# Patient Record
Sex: FEMALE | Race: WHITE | NOT HISPANIC OR LATINO | Employment: OTHER | URBAN - METROPOLITAN AREA
[De-identification: names, ages, dates, MRNs, and addresses within clinical notes are randomized per-mention and may not be internally consistent; named-entity substitution may affect disease eponyms.]

---

## 2017-01-12 ENCOUNTER — CONVERSION ENCOUNTER (OUTPATIENT)
Dept: MAMMOGRAPHY | Facility: CLINIC | Age: 62
End: 2017-01-12

## 2018-04-20 LAB
ABSOL LYMPHOCYTES (HISTORICAL): 1.7 K/UL (ref 0.5–4)
ALBUMIN SERPL BCP-MCNC: 4.5 G/DL (ref 3–5.2)
ALP SERPL-CCNC: 61 U/L (ref 43–122)
ALT SERPL W P-5'-P-CCNC: 71 U/L (ref 9–52)
ANION GAP SERPL CALCULATED.3IONS-SCNC: 14 MMOL/L (ref 5–14)
APTT PPP: 26 SEC (ref 23–31)
AST SERPL W P-5'-P-CCNC: 56 U/L (ref 14–36)
B-NP NT PRO (HISTORICAL): 35 PG/ML
BASOPHILS # BLD AUTO: 0 % (ref 0–1)
BASOPHILS # BLD AUTO: 0 K/UL (ref 0–0.1)
BILIRUB SERPL-MCNC: 0.6 MG/DL
BUN SERPL-MCNC: 17 MG/DL (ref 5–25)
CALCIUM SERPL-MCNC: 10.3 MG/DL (ref 8.4–10.2)
CHLORIDE SERPL-SCNC: 103 MEQ/L (ref 97–108)
CO2 SERPL-SCNC: 26 MMOL/L (ref 22–30)
CREATINE, SERUM (HISTORICAL): 0.86 MG/DL (ref 0.6–1.2)
D-DIMER QUANTITATIVE (HISTORICAL): 1.82 MG/L FEU'S
DEPRECATED RDW RBC AUTO: 13.7 %
EGFR (HISTORICAL): >60 ML/MIN/1.73 M2
EOSINOPHIL # BLD AUTO: 0.2 K/UL (ref 0–0.4)
EOSINOPHIL NFR BLD AUTO: 3 % (ref 0–6)
ERYTHROCYTE SEDIMENTATION RATE (HISTORICAL): 58 MM (ref 1–20)
GLUCOSE SERPL-MCNC: 132 MG/DL (ref 70–99)
HCT VFR BLD AUTO: 43.9 % (ref 36–46)
HGB BLD-MCNC: 14.7 G/DL (ref 12–16)
LYMPHOCYTES NFR BLD AUTO: 29 % (ref 25–45)
MCH RBC QN AUTO: 30.4 PG (ref 26–34)
MCHC RBC AUTO-ENTMCNC: 33.4 % (ref 31–36)
MCV RBC AUTO: 91 FL (ref 80–100)
MONOCYTES # BLD AUTO: 0.4 K/UL (ref 0.2–0.9)
MONOCYTES NFR BLD AUTO: 6 % (ref 1–10)
NEUTROPHILS ABS COUNT (HISTORICAL): 3.6 K/UL (ref 1.8–7.8)
NEUTS SEG NFR BLD AUTO: 62 % (ref 45–65)
PLATELET # BLD AUTO: 171 K/MCL (ref 150–450)
POTASSIUM SERPL-SCNC: 4 MEQ/L (ref 3.6–5)
PT - I.N. RATIO (HISTORICAL): 1 RATIO(INR)
PT, PATIENT (HISTORICAL): 10.3 SEC (ref 9.2–11.1)
RBC # BLD AUTO: 4.83 M/MCL (ref 4–5.2)
SODIUM SERPL-SCNC: 144 MEQ/L (ref 137–147)
T4 FREE SERPL-MCNC: 1.22 NG/DL (ref 0.78–2.19)
TOTAL PROTEIN (HISTORICAL): 8 G/DL (ref 5.9–8.4)
TROPONIN I SERPL-MCNC: <0.01 NG/ML (ref 0–0.03)
TSH SERPL DL<=0.05 MIU/L-ACNC: 6.78 UIU/ML (ref 0.47–4.68)
WBC # BLD AUTO: 5.9 K/MCL (ref 4.5–11)

## 2018-04-21 LAB
ALBUMIN SERPL BCP-MCNC: 4.2 G/DL (ref 3–5.2)
ALP SERPL-CCNC: 59 U/L (ref 43–122)
ALT SERPL W P-5'-P-CCNC: 69 U/L (ref 9–52)
ANION GAP SERPL CALCULATED.3IONS-SCNC: 11 MMOL/L (ref 5–14)
AST SERPL W P-5'-P-CCNC: 53 U/L (ref 14–36)
BILIRUB SERPL-MCNC: 0.6 MG/DL
BUN SERPL-MCNC: 17 MG/DL (ref 5–25)
CALCIUM SERPL-MCNC: 10.1 MG/DL (ref 8.4–10.2)
CHLORIDE SERPL-SCNC: 103 MEQ/L (ref 97–108)
CHOLEST SERPL-MCNC: 215 MG/DL
CHOLEST/HDLC SERPL: 4.1 {RATIO}
CK SERPL-CCNC: 87 U/L (ref 30–135)
CK SERPL-CCNC: 91 U/L (ref 30–135)
CO2 SERPL-SCNC: 27 MMOL/L (ref 22–30)
CREATINE KINASE-MB FRACTION (HISTORICAL): 0.47 NG/ML (ref 0–2.37)
CREATINE KINASE-MB FRACTION (HISTORICAL): 0.57 NG/ML (ref 0–2.37)
CREATINE, SERUM (HISTORICAL): 0.86 MG/DL (ref 0.6–1.2)
EGFR (HISTORICAL): >60 ML/MIN/1.73 M2
GLUCOSE SERPL-MCNC: 102 MG/DL (ref 70–99)
HDLC SERPL-MCNC: 52 MG/DL
HEPATITIS A IGM ANTIBODY (HISTORICAL): NORMAL
HEPATITIS B CORE IGM ANTIBODY (HISTORICAL): NORMAL
HEPATITIS B SURFACE AG CONFIRMATION (HISTORICAL): NORMAL
HEPATITIS C ANTIBODY (HISTORICAL): NORMAL
LDL/HDL RATIO (HISTORICAL): 2.4
LDLC SERPL CALC-MCNC: 123 MG/DL
MAGNESIUM SERPL-MCNC: 1.9 MG/DL (ref 1.6–2.3)
POTASSIUM SERPL-SCNC: 4.2 MEQ/L (ref 3.6–5)
SODIUM SERPL-SCNC: 141 MEQ/L (ref 137–147)
TOTAL PROTEIN (HISTORICAL): 7.7 G/DL (ref 5.9–8.4)
TRIGL SERPL-MCNC: 199 MG/DL
TROPONIN I SERPL-MCNC: <0.01 NG/ML (ref 0–0.03)
TROPONIN I SERPL-MCNC: <0.01 NG/ML (ref 0–0.03)
VLDLC SERPL CALC-MCNC: 40 MG/DL (ref 0–40)

## 2018-04-22 LAB
EST. AVERAGE GLUCOSE BLD GHB EST-MCNC: 126 MG/DL
HBA1C MFR BLD HPLC: 6 %

## 2018-10-11 RX ORDER — ASPIRIN 325 MG
TABLET, DELAYED RELEASE (ENTERIC COATED) ORAL
Refills: 2 | COMMUNITY
Start: 2018-07-09 | End: 2019-07-17 | Stop reason: SDUPTHER

## 2018-10-11 RX ORDER — OXYBUTYNIN CHLORIDE 5 MG/1
TABLET ORAL EVERY 12 HOURS
COMMUNITY
Start: 2018-05-07 | End: 2019-07-17 | Stop reason: SDUPTHER

## 2018-10-11 RX ORDER — PANTOPRAZOLE SODIUM 40 MG/1
TABLET, DELAYED RELEASE ORAL EVERY 24 HOURS
COMMUNITY
Start: 2018-06-05 | End: 2019-01-07 | Stop reason: SDUPTHER

## 2018-10-11 RX ORDER — ALBUTEROL SULFATE 90 UG/1
AEROSOL, METERED RESPIRATORY (INHALATION) EVERY 4 HOURS
COMMUNITY
Start: 2018-03-26 | End: 2018-10-12 | Stop reason: SDUPTHER

## 2018-10-12 ENCOUNTER — OFFICE VISIT (OUTPATIENT)
Dept: FAMILY MEDICINE CLINIC | Facility: CLINIC | Age: 63
End: 2018-10-12
Payer: COMMERCIAL

## 2018-10-12 VITALS
SYSTOLIC BLOOD PRESSURE: 120 MMHG | HEART RATE: 72 BPM | DIASTOLIC BLOOD PRESSURE: 80 MMHG | BODY MASS INDEX: 47.54 KG/M2 | HEIGHT: 55 IN | RESPIRATION RATE: 16 BRPM | TEMPERATURE: 96.3 F | WEIGHT: 205.4 LBS

## 2018-10-12 DIAGNOSIS — Z12.39 ENCOUNTER FOR BREAST CANCER SCREENING OTHER THAN MAMMOGRAM: Primary | ICD-10-CM

## 2018-10-12 DIAGNOSIS — R10.9 ABDOMINAL DISCOMFORT: ICD-10-CM

## 2018-10-12 DIAGNOSIS — Z23 NEED FOR INFLUENZA VACCINATION: ICD-10-CM

## 2018-10-12 PROCEDURE — 99213 OFFICE O/P EST LOW 20 MIN: CPT

## 2018-10-12 PROCEDURE — 90682 RIV4 VACC RECOMBINANT DNA IM: CPT

## 2018-10-12 PROCEDURE — 90471 IMMUNIZATION ADMIN: CPT

## 2018-10-12 RX ORDER — ALBUTEROL SULFATE 90 UG/1
1 AEROSOL, METERED RESPIRATORY (INHALATION) EVERY 6 HOURS
COMMUNITY
End: 2019-07-17 | Stop reason: SDUPTHER

## 2018-10-12 RX ORDER — OXYBUTYNIN CHLORIDE 5 MG/1
TABLET ORAL EVERY 12 HOURS
COMMUNITY
Start: 2018-05-07 | End: 2018-10-12 | Stop reason: SDUPTHER

## 2018-10-12 RX ORDER — PANTOPRAZOLE SODIUM 40 MG/1
TABLET, DELAYED RELEASE ORAL EVERY 24 HOURS
COMMUNITY
Start: 2018-06-05 | End: 2018-10-12 | Stop reason: SDUPTHER

## 2018-10-12 NOTE — PATIENT INSTRUCTIONS
Influenza A Virus Vaccine, H1N1, Inactivated (Injection)   Influenza A Virus Vaccine, H1N1, Inactivated (in-floo-EN-za AY VYE-junior VAX-een, H1N1, in-AK-ti-vay-ronald)  Keeps you from getting sick with an influenza A (H1N1) 2009 virus ("catching the flu")  Brand Name(s):   There may be other brand names for this medicine  When This Medicine Should Not Be Used: You should not receive this vaccine if you have had an allergic reaction to influenza vaccine (a "flu shot"), eggs, egg products, chicken products, neomycin (Mycifradin®), or polymyxin  How to Use This Medicine:   Injectable  · Your doctor will prescribe your exact dose and tell you how often it should be given  This medicine is given as a shot into one of your muscles  · A nurse or other health provider will give you this medicine  If a dose is missed:   · Most people need only one dose of this medicine  · If your child needs a second dose of this medicine, it is very important for your child to receive the second dose on schedule  If you must cancel the appointment for the second dose, make another appointment as close to that date as possible  Drugs and Foods to Avoid:   Ask your doctor or pharmacist before using any other medicine, including over-the-counter medicines, vitamins, and herbal products  · Make sure your doctor knows if you are using any medicine that weakens the immune system (such as steroids, cancer treatments, or radiation)  Warnings While Using This Medicine:   · Make sure your doctor knows if you are pregnant or breastfeeding, or if you have HIV or AIDS, cancer, problems with your immune system, or a history of Guillain-Barré syndrome (a severe nerve and muscle problem)  · This vaccine may cause a serious type of allergic reaction called anaphylaxis  Anaphylaxis can be life-threatening and requires immediate medical attention   Tell your doctor right away if you have a rash, itching, swelling of the tongue and throat, or trouble with breathing after you get the injection  · Avoid people who are sick or have infections  · This medicine will not treat flu symptoms if you already have the virus  Possible Side Effects While Using This Medicine:   Call your doctor right away if you notice any of these side effects:  · Allergic reaction: Itching or hives, swelling in your face or hands, swelling or tingling in your mouth or throat, chest tightness, trouble breathing  · Fever, chills, cough, sore throat, and body aches  · Severe headache  · Unusual tiredness or weakness  If you notice these less serious side effects, talk with your doctor:   · Back, muscle, or joint pain  · Diarrhea, nausea, or vomiting  · Mild headache  · Pain, redness, swelling, or tenderness where the shot was given  · Stuffy or runny nose  · Sweating  If you notice other side effects that you think are caused by this medicine, tell your doctor  Call your doctor for medical advice about side effects  You may report side effects to FDA at 0-977-FDA-3773  © 2014 [address] is for End User's use only and may not be sold, redistributed or otherwise used for commercial purposes  The above information is an  only  It is not intended as medical advice for individual conditions or treatments  Talk to your doctor, nurse or pharmacist before following any medical regimen to see if it is safe and effective for you

## 2018-10-12 NOTE — PROGRESS NOTES
Assessment/Plan:    No problem-specific Assessment & Plan notes found for this encounter  Diagnoses and all orders for this visit:    Encounter for breast cancer screening other than mammogram  -     Mammo diagnostic bilateral w cad; Future    Abdominal discomfort  -     influenza vaccine, [id number], quadrivalent, recombinant, PF, 0 5 mL, for patients 18 yr+ (FLUBLOK)  Will see if improving and if not will need to re-eval and possible scan  Difficult to feel due to habitus  Due for GYN and refer to [facility] to make sure not ovary related  Need for influenza vaccination  -     influenza vaccine, [id number], quadrivalent, recombinant, PF, 0 5 mL, for patients 18 yr+ (FLUBLOK)    Other orders  -     aspirin (ECOTRIN) 325 mg EC tablet; TK 1 T PO  D  -     Ergocalciferol 2000 units CAPS; take as directed  -     oxybutynin (DITROPAN) 5 mg tablet; Every 12 hours  -     pantoprazole (PROTONIX) 40 mg tablet; every 24 hours  -     Discontinue: albuterol (VENTOLIN HFA) 90 mcg/act inhaler; every 4 (four) hours  -     Discontinue: aspirin (ECOTRIN) 325 mg EC tablet; every 24 hours  -     Discontinue: Ergocalciferol 2000 units CAPS; take as directed  -     albuterol (PROVENTIL HFA,VENTOLIN HFA) 90 mcg/act inhaler; Inhale 1 puff every 6 (six) hours  -     Discontinue: oxybutynin (DITROPAN) 5 mg tablet; Every 12 hours  -     Discontinue: pantoprazole (PROTONIX) 40 mg tablet; every 24 hours          Subjective:      Patient ID: Brendan Medina is a 59 y o  female  HPI    Azul Bullock presents today for groin pain  It has been going on for about 3 weeks  States that it is there most of the time but gets more intense from time to time  Certain movements will make the pain worse  No injury that she knows of at this point  Has had no treatment to date        She is also due for her flu injection today which we will give her as well as a script for her mammogram     The following portions of the patient's history were reviewed and updated as appropriate: allergies, current medications, past family history, past medical history, past social history, past surgical history and problem list     Review of Systems   Constitutional: Positive for activity change  Negative for appetite change, fatigue and fever  Slight change in activity due to discomfort    Discomfort with steps and certain postitions    HENT: Negative for congestion, sinus pain, sinus pressure, sneezing and tinnitus  Respiratory: Negative for cough  Cardiovascular: Negative for chest pain  Gastrointestinal: Positive for diarrhea  Negative for abdominal pain, constipation and nausea  Saw Dr Rochelle De Luna and has a stimulator in place  Is helping    Genitourinary: Positive for frequency  Negative for dysuria  Musculoskeletal:        Left groin pain    Neurological: Negative for dizziness, light-headedness and numbness  Objective:  Vitals:    10/12/18 0806   BP: 120/80   BP Location: Left arm   Patient Position: Sitting   Cuff Size: Large   Pulse: 72   Resp: 16   Temp: (!) 96 3 °F (35 7 °C)   TempSrc: Temporal   Weight: 93 2 kg (205 lb 6 4 oz)   Height: 4' 7" (1 397 m)      Physical Exam   Constitutional: She appears well-developed and well-nourished  Abdominal: Soft  Normal appearance and bowel sounds are normal  There is tenderness in the left lower quadrant  Genitourinary:   Genitourinary Comments: Has a stimulator in right buttock and wire implant for rectal wall weakness   Vitals reviewed

## 2018-10-17 ENCOUNTER — HOSPITAL ENCOUNTER (OUTPATIENT)
Dept: MAMMOGRAPHY | Facility: CLINIC | Age: 63
Discharge: HOME | End: 2018-10-17
Payer: COMMERCIAL

## 2018-10-17 DIAGNOSIS — Z12.39 ENCOUNTER FOR BREAST CANCER SCREENING OTHER THAN MAMMOGRAM: ICD-10-CM

## 2018-10-17 PROCEDURE — 77067 SCR MAMMO BI INCL CAD: CPT

## 2018-10-23 ENCOUNTER — OFFICE VISIT (OUTPATIENT)
Dept: FAMILY MEDICINE CLINIC | Facility: CLINIC | Age: 63
End: 2018-10-23
Payer: COMMERCIAL

## 2018-10-23 VITALS
HEART RATE: 74 BPM | DIASTOLIC BLOOD PRESSURE: 80 MMHG | SYSTOLIC BLOOD PRESSURE: 102 MMHG | WEIGHT: 205 LBS | TEMPERATURE: 99.3 F | HEIGHT: 55 IN | BODY MASS INDEX: 47.44 KG/M2

## 2018-10-23 DIAGNOSIS — H60.392 OTHER INFECTIVE ACUTE OTITIS EXTERNA OF LEFT EAR: ICD-10-CM

## 2018-10-23 DIAGNOSIS — R50.9 FEVER, UNSPECIFIED FEVER CAUSE: Primary | ICD-10-CM

## 2018-10-23 LAB — S PYO AG THROAT QL: NEGATIVE

## 2018-10-23 PROCEDURE — 3008F BODY MASS INDEX DOCD: CPT | Performed by: NURSE PRACTITIONER

## 2018-10-23 PROCEDURE — 87880 STREP A ASSAY W/OPTIC: CPT | Performed by: NURSE PRACTITIONER

## 2018-10-23 PROCEDURE — 87430 STREP A AG IA: CPT | Performed by: NURSE PRACTITIONER

## 2018-10-23 PROCEDURE — 99213 OFFICE O/P EST LOW 20 MIN: CPT | Performed by: NURSE PRACTITIONER

## 2018-10-23 PROCEDURE — 3725F SCREEN DEPRESSION PERFORMED: CPT | Performed by: NURSE PRACTITIONER

## 2018-10-23 PROCEDURE — 87070 CULTURE OTHR SPECIMN AEROBIC: CPT | Performed by: NURSE PRACTITIONER

## 2018-10-23 RX ORDER — OFLOXACIN 3 MG/ML
10 SOLUTION AURICULAR (OTIC) 2 TIMES DAILY
Qty: 5 ML | Refills: 0 | Status: SHIPPED | OUTPATIENT
Start: 2018-10-23 | End: 2019-10-03 | Stop reason: ALTCHOICE

## 2018-10-23 NOTE — PATIENT INSTRUCTIONS
Otitis Externa   WHAT YOU NEED TO KNOW:   What is otitis externa? Otitis externa, or swimmer's ear, is an infection in the outer ear canal  This canal goes from the outside of the ear to the eardrum  What causes otitis externa? Otitis externa is most commonly caused by bacteria  It can also be caused by damage to the skin lining your outer ear canal  You can scratch or damage the skin lining when you put cotton swabs or other objects in your ears  What increases my risk for otitis externa? · Swimming    · Hot, humid weather    · Hearing aid use    · A lot of ear wax    · Allergic skin disorders, such as eczema    · Medical conditions that make it easier to get infections, such as diabetes  What are the signs and symptoms of otitis externa? · You have ear pain  · Your outer ear canal is red and swollen  · You have clear fluid or pus leaking out of your ear  · Your outer ear canal is itchy and you see a rash  · You have trouble hearing because your ear is plugged  · You feel a bump in your ear canal, called a polyp  · Flakes of skin fall from your ear  How is otitis externa diagnosed? Your healthcare provider will ask about your signs and symptoms  He will look inside your ears  He may blow a puff of air inside your ears  These tests tell healthcare providers if your eardrums look healthy  You may also need a hearing test    How is otitis externa treated? · NSAIDs , such as ibuprofen, help decrease swelling, pain, and fever  This medicine is available with or without a doctor's order  NSAIDs can cause stomach bleeding or kidney problems in certain people  If you take blood thinner medicine, always ask if NSAIDs are safe for you  Always read the medicine label and follow directions  Do not give these medicines to children under 10months of age without direction from your child's healthcare provider  · Acetaminophen  decreases pain and fever   It is available without a doctor's order  Ask how much to take and how often to take it  Follow directions  Acetaminophen can cause liver damage if not taken correctly  · Ear drops  that contain an antibiotic may be given  The antibiotic helps treat a bacterial infection  You may also be given steroid medicine  The steroid helps decrease redness, swelling, and pain  · Ear wicking  removes fluid or wax from your outer ear canal  Healthcare providers may insert a small tube, called a wick, into your ear to help drain fluid  A wick also may be used to put medicine into your ear canal if the canal is blocked  How do I use eardrops? · Lie down on your side with your infected ear facing up  · Carefully drip the correct number of eardrops into your ear  Have another person help you if possible  · Gently move the outside part of your ear back and forth to help the medicine reach your ear canal      · Stay lying down in the same position (with your ear facing up) for 3 to 5 minutes  How can I prevent otitis externa? · Do not put cotton swabs or foreign objects in your ears  · Wrap a clean moist washcloth around your finger, and use it to clean your outer ear and remove extra ear wax  · Use ear plugs when you swim  Dry your outer ears completely after you swim or bathe  When should I seek immediate care? · You have severe ear pain  · You are suddenly unable to hear at all  · You have new swelling in your face, behind your ears, or in your neck  · You suddenly cannot move part of your face  · Your face suddenly feels numb  When should I contact my healthcare provider? · You have a fever  · Your signs and symptoms do not get better after 2 days of treatment  · Your signs and symptoms go away for a time, but then come back  · You have questions or concerns about your condition or care  CARE AGREEMENT:   You have the right to help plan your care  Learn about your health condition and how it may be treated  Discuss treatment options with your caregivers to decide what care you want to receive  You always have the right to refuse treatment  The above information is an  only  It is not intended as medical advice for individual conditions or treatments  Talk to your doctor, nurse or pharmacist before following any medical regimen to see if it is safe and effective for you  © 2017 [address] Information is for End User's use only and may not be sold, redistributed or otherwise used for commercial purposes  All illustrations and images included in CareNotes® are the copyrighted property of A D [employer] M [employer]  or [employer]ilberto Worthington

## 2018-10-23 NOTE — PROGRESS NOTES
Assessment/Plan:    No problem-specific Assessment & Plan notes found for this encounter  Diagnoses and all orders for this visit:    Other infective acute otitis externa of left ear  -     ofloxacin (FLOXIN) 0 3 % otic solution; Administer 10 drops into the left ear 2 (two) times a day  No further use of qtips     Return if not better or drops dont get in         Subjective:      Patient ID: Brendan Medina is a 59 y o  female  HPI      Azul Bullock presents today for upper respiratory type symptoms  It started on Saturday  It started with a sore throat and is progressing to a left ear ache a lot of mucus and nasal congestion  Her granddaughter is sick with an upper respiratory type of disease at this time  Uses q tips   Advised to stop      The following portions of the patient's history were reviewed and updated as appropriate: allergies, current medications, past family history, past medical history, past social history, past surgical history and problem list     Review of Systems   Constitutional: Positive for appetite change, fatigue and fever  Negative for activity change  HENT: Positive for congestion, ear pain, rhinorrhea and sore throat  Negative for sneezing  Respiratory: Positive for cough  Negative for shortness of breath  Cardiovascular: Negative for chest pain  Gastrointestinal: Negative for abdominal pain, constipation, diarrhea and nausea  Genitourinary: Negative for dysuria, frequency and urgency  Musculoskeletal: Negative for back pain  Skin: Negative for rash  Neurological: Negative for dizziness, light-headedness and headaches  Objective:  Vitals:    10/23/18 1306   BP: 102/80   BP Location: Left arm   Patient Position: Sitting   Cuff Size: Large   Pulse: 74   Temp: 99 3 °F (37 4 °C)   TempSrc: Oral   Weight: 93 kg (205 lb)   Height: 4' 7" (1 397 m)      Physical Exam   Constitutional: She appears well-developed and well-nourished     HENT:   Right Ear: Tympanic membrane, external ear and ear canal normal    Left Ear: There is swelling and tenderness  No drainage  Mouth/Throat: Mucous membranes are normal  Posterior oropharyngeal erythema present  No oropharyngeal exudate  Canal red and inflammed   Do see a slight opening  Hurts to pull     Eyes: Conjunctivae are normal    Neck: Normal range of motion  Neck supple  Cardiovascular: Normal rate, regular rhythm and normal heart sounds  Pulmonary/Chest: Effort normal and breath sounds normal    Abdominal: Soft  Bowel sounds are normal    Lymphadenopathy:     She has no cervical adenopathy  Vitals reviewed

## 2018-10-26 LAB — BACTERIA THROAT CULT: NORMAL

## 2018-11-05 ENCOUNTER — ANNUAL EXAM (OUTPATIENT)
Dept: FAMILY MEDICINE CLINIC | Facility: CLINIC | Age: 63
End: 2018-11-05
Payer: COMMERCIAL

## 2018-11-05 VITALS
SYSTOLIC BLOOD PRESSURE: 100 MMHG | DIASTOLIC BLOOD PRESSURE: 70 MMHG | BODY MASS INDEX: 47.54 KG/M2 | RESPIRATION RATE: 18 BRPM | HEIGHT: 55 IN | HEART RATE: 66 BPM | WEIGHT: 205.4 LBS

## 2018-11-05 DIAGNOSIS — Z12.39 SCREENING FOR MALIGNANT NEOPLASM OF BREAST: ICD-10-CM

## 2018-11-05 DIAGNOSIS — E55.9 VITAMIN D DEFICIENCY: ICD-10-CM

## 2018-11-05 DIAGNOSIS — Z01.419 WELL FEMALE EXAM WITH ROUTINE GYNECOLOGICAL EXAM: Primary | ICD-10-CM

## 2018-11-05 LAB — 25(OH)D3 SERPL-MCNC: 33.9 NG/ML (ref 30–100)

## 2018-11-05 PROCEDURE — 87624 HPV HI-RISK TYP POOLED RSLT: CPT | Performed by: NURSE PRACTITIONER

## 2018-11-05 PROCEDURE — 36415 COLL VENOUS BLD VENIPUNCTURE: CPT | Performed by: NURSE PRACTITIONER

## 2018-11-05 PROCEDURE — G0145 SCR C/V CYTO,THINLAYER,RESCR: HCPCS | Performed by: NURSE PRACTITIONER

## 2018-11-05 PROCEDURE — 99214 OFFICE O/P EST MOD 30 MIN: CPT | Performed by: NURSE PRACTITIONER

## 2018-11-05 PROCEDURE — 82306 VITAMIN D 25 HYDROXY: CPT | Performed by: NURSE PRACTITIONER

## 2018-11-05 NOTE — PATIENT INSTRUCTIONS
Heart Healthy Diet   WHAT YOU NEED TO KNOW:   A heart healthy diet is an eating plan low in total fat, unhealthy fats, and sodium (salt)  A heart healthy diet helps decrease your risk for heart disease and stroke  Limit the amount of fat you eat to 25% to 35% of your total daily calories  Limit sodium to less than 2,300 mg each day  DISCHARGE INSTRUCTIONS:   Healthy fats:  Healthy fats can help improve cholesterol levels  The risk for heart disease is decreased when cholesterol levels are normal  Choose healthy fats, such as the following:  · Unsaturated fat  is found in foods such as soybean, canola, olive, corn, and safflower oils  It is also found in soft tub margarine that is made with liquid vegetable oil  · Omega-3 fat  is found in certain fish, such as salmon, tuna, and trout, and in walnuts and flaxseed  Unhealthy fats:  Unhealthy fats can cause unhealthy cholesterol levels in your blood and increase your risk of heart disease  Limit unhealthy fats, such as the following:  · Cholesterol  is found in animal foods, such as eggs and lobster, and in dairy products made from whole milk  Limit cholesterol to less than 300 milligrams (mg) each day  You may need to limit cholesterol to 200 mg each day if you have heart disease  · Saturated fat  is found in meats, such as stern and hamburger  It is also found in chicken or turkey skin, whole milk, and butter  Limit saturated fat to less than 7% of your total daily calories  Limit saturated fat to less than 6% if you have heart disease or are at increased risk for it  · Trans fat  is found in packaged foods, such as potato chips and cookies  It is also in hard margarine, some fried foods, and shortening  Avoid trans fats as much as possible    Heart healthy foods and drinks to include:  Ask your dietitian or healthcare provider how many servings to have from each of the following food groups:  · Grains:      ¨ Whole-wheat breads, cereals, and pastas, and brown rice    ¨ Low-fat, low-sodium crackers and chips    · Vegetables:      ¨ Broccoli, green beans, green peas, and spinach    ¨ Collards, kale, and lima beans    ¨ Carrots, sweet potatoes, tomatoes, and peppers    ¨ Canned vegetables with no salt added    · Fruits:      ¨ Bananas, peaches, pears, and pineapple    ¨ Grapes, raisins, and dates    ¨ Oranges, tangerines, grapefruit, orange juice, and grapefruit juice    ¨ Apricots, mangoes, melons, and papaya    ¨ Raspberries and strawberries    ¨ Canned fruit with no added sugar    · Low-fat dairy products:      ¨ Nonfat (skim) milk, 1% milk, and low-fat almond, cashew, or soy milks fortified with calcium    ¨ Low-fat cheese, regular or frozen yogurt, and cottage cheese    · Meats and proteins , such as lean cuts of beef and pork (loin, leg, round), skinless chicken and turkey, legumes, soy products, egg whites, and nuts  Foods and drinks to limit or avoid:  Ask your dietitian or healthcare provider about these and other foods that are high in unhealthy fat, sodium, and sugar:  · Snack or packaged foods , such as frozen dinners, cookies, macaroni and cheese, and cereals with more than 300 mg of sodium per serving    · Canned or dry mixes  for cakes, soups, sauces, or gravies    · Vegetables with added sodium , such as instant potatoes, vegetables with added sauces, or regular canned vegetables    · Other foods high in sodium , such as ketchup, barbecue sauce, salad dressing, pickles, olives, soy sauce, and miso    · High-fat dairy foods  such as whole or 2% milk, cream cheese, or sour cream, and cheeses     · High-fat protein foods  such as high-fat cuts of beef (T-bone steaks, ribs), chicken or turkey with skin, and organ meats, such as liver    · Cured or smoked meats , such as hot dogs, stern, and sausage    · Unhealthy fats and oils , such as butter, stick margarine, shortening, and cooking oils such as coconut or palm oil    · Food and drinks high in sugar , such as soft drinks (soda), sports drinks, sweetened tea, candy, cake, cookies, pies, and doughnuts  Other diet guidelines to follow:   · Eat more foods containing omega-3 fats  Eat fish high in omega-3 fats at least 2 times a week  · Limit alcohol  Too much alcohol can damage your heart and raise your blood pressure  Women should limit alcohol to 1 drink a day  Men should limit alcohol to 2 drinks a day  A drink of alcohol is 12 ounces of beer, 5 ounces of wine, or 1½ ounces of liquor  · Choose low-sodium foods  High-sodium foods can lead to high blood pressure  Add little or no salt to food you prepare  Use herbs and spices in place of salt  · Eat more fiber  to help lower cholesterol levels  Eat at least 5 servings of fruits and vegetables each day  Eat 3 ounces of whole-grain foods each day  Legumes (beans) are also a good source of fiber  Lifestyle guidelines:   · Do not smoke  Nicotine and other chemicals in cigarettes and cigars can cause lung and heart damage  Ask your healthcare provider for information if you currently smoke and need help to quit  E-cigarettes or smokeless tobacco still contain nicotine  Talk to your healthcare provider before you use these products  · Exercise regularly  to help you maintain a healthy weight and improve your blood pressure and cholesterol levels  Ask your healthcare provider about the best exercise plan for you  Do not start an exercise program without asking your healthcare provider  Follow up with your healthcare provider as directed:  Write down your questions so you remember to ask them during your visits  © 2017 [address] Information is for End User's use only and may not be sold, redistributed or otherwise used for commercial purposes  All illustrations and images included in CareNotes® are the copyrighted property of [employer]  or Edilberto Worthington  The above information is an  only   It is not intended as medical advice for individual conditions or treatments  Talk to your doctor, nurse or pharmacist before following any medical regimen to see if it is safe and effective for you

## 2018-11-05 NOTE — PROGRESS NOTES
Gary Gutierrez is a 59 y o   female and is here for routine health maintenance  The patient reports no problems  History of Present Illness     HPI    Well Adult Physical   Patient here for a Gynecological exam       Diet and Physical Activity  Diet: poor diet  Weight concerns: Patient has class 3 obesity (BMI >40)  Exercise: infrequently      Depression Screen  PHQ-9 Depression Screening    PHQ-9:    Frequency of the following problems over the past two weeks:                History:  Menopause at 49 years  : 1  Para: 1  Breast Fed no  Bottle Fed yes  Both no   Screening  Colononoscopy 2016  Mammogram last month   Pap 2016  DEXA will order  Abnormal pap? yes      The following portions of the patient's history were reviewed and updated as appropriate: allergies, current medications, past family history, past medical history, past social history, past surgical history and problem list     Review of Systems     Review of Systems   Constitutional: Negative for chills, fatigue and fever  Cardiovascular: Negative for leg swelling  Gastrointestinal: Negative for abdominal pain  Genitourinary: Negative for dyspareunia, dysuria, frequency, genital sores, menstrual problem, pelvic pain, urgency, vaginal bleeding, vaginal discharge and vaginal pain  Skin: Negative for rash  Hematological: Negative for adenopathy         Breast ROS: negative for breast lumps  Self Breast Exam No  Genito-Urinary ROS: no dysuria, trouble voiding, or hematuria  Hot Flashes no  Night Sweats yes  Vaginal dryness no  Insomnia no  Sexually Active no  Calcium no  Vitamin D yes  Weight bearing Exercise yes  Past Medical History     Past Medical History:   Diagnosis Date    Asthma     Lateral epicondylitis 10/9/2015       Past Surgical History     Past Surgical History:   Procedure Laterality Date    IMPLANTATION VAGAL NERVE STIMULATOR         Social History     Social History     Social History    Marital status: Single     Spouse name: N/A    Number of children: N/A    Years of education: N/A     Social History Main Topics    Smoking status: Former Smoker     Packs/day: 1 00     Types: Cigarettes     Quit date: 2015    Smokeless tobacco: Former User    Alcohol use No    Drug use: No    Sexual activity: Not Asked     Other Topics Concern    None     Social History Narrative    None       Family History     Family History   Problem Relation Age of Onset    Lung cancer Mother        Current Medications       Current Outpatient Prescriptions:     albuterol (PROVENTIL HFA,VENTOLIN HFA) 90 mcg/act inhaler, Inhale 1 puff every 6 (six) hours, Disp: , Rfl:     aspirin (ECOTRIN) 325 mg EC tablet, TK 1 T PO  D, Disp: , Rfl: 2    Ergocalciferol 2000 units CAPS, take as directed, Disp: , Rfl:     ofloxacin (FLOXIN) 0 3 % otic solution, Administer 10 drops into the left ear 2 (two) times a day, Disp: 5 mL, Rfl: 0    oxybutynin (DITROPAN) 5 mg tablet, Every 12 hours, Disp: , Rfl:     pantoprazole (PROTONIX) 40 mg tablet, every 24 hours, Disp: , Rfl:      Allergies     Allergies   Allergen Reactions    Lansoprazole Palpitations     Erythema/ tachycardia  Other reaction(s): tachycardia    Penicillins Itching and Rash     Other reaction(s): Rash  Other reaction(s): Rash       Objective     /70 (BP Location: Left arm, Patient Position: Sitting, Cuff Size: Large)   Pulse 66   Resp 18   Ht 4' 7" (1 397 m)   Wt 93 2 kg (205 lb 6 4 oz)   BMI 47 74 kg/m²      Physical Exam   Constitutional: She is oriented to person, place, and time  She appears well-developed and well-nourished  Neck: No thyromegaly present  Cardiovascular: Normal rate, regular rhythm and normal heart sounds  Pulmonary/Chest: Effort normal and breath sounds normal  Right breast exhibits no inverted nipple, no mass, no nipple discharge, no skin change and no tenderness   Left breast exhibits no inverted nipple, no mass, no nipple discharge, no skin change and no tenderness  Breasts are symmetrical    Abdominal: There is no tenderness  Hernia confirmed negative in the right inguinal area and confirmed negative in the left inguinal area  Genitourinary: Rectum normal and uterus normal        No breast swelling, tenderness, discharge or bleeding  No labial fusion  There is no rash, tenderness, lesion or injury on the right labia  There is no rash, tenderness, lesion or injury on the left labia  Cervix exhibits friability  Cervix exhibits no motion tenderness and no discharge  Right adnexum displays no mass, no tenderness and no fullness  Left adnexum displays no mass, no tenderness and no fullness  Genitourinary Comments: Vagina Atrophic  Multiple vulvar nodules  Need biopsy  Obesity limits exam accuracy   Lymphadenopathy:     She has no cervical adenopathy  Right: No inguinal adenopathy present  Left: No inguinal adenopathy present  Neurological: She is alert and oriented to person, place, and time  Skin: Skin is warm and dry  Psychiatric: She has a normal mood and affect   Her behavior is normal          No exam data present    Health Maintenance     Health Maintenance   Topic Date Due    Depression Screening PHQ  10/23/2019    DTaP,Tdap,and Td Vaccines (2 - Td) 09/04/2025    INFLUENZA VACCINE  Completed     Immunization History   Administered Date(s) Administered    Influenza 11/07/2015, 11/14/2016, 11/01/2017    Influenza, Quadrivalent (nasal) 11/01/2017    Influenza, recombinant, quadrivalent,injectable, preservative free 10/12/2018    Tdap 09/04/2015       Assessment/Plan     Healthy well female  Waiting on PAP/HPV and DEXA results  Will return for vulvar biopsy  Obesity: Discussed importance of weight loss and exercise  BRIE Alvarez

## 2018-11-06 LAB
HPV HR 12 DNA CVX QL NAA+PROBE: POSITIVE
HPV16 DNA CVX QL NAA+PROBE: NEGATIVE
HPV18 DNA CVX QL NAA+PROBE: NEGATIVE

## 2018-11-08 LAB
LAB AP GYN PRIMARY INTERPRETATION: NORMAL
Lab: NORMAL
PATH INTERP SPEC-IMP: NORMAL

## 2018-11-12 DIAGNOSIS — N76.0 BV (BACTERIAL VAGINOSIS): Primary | ICD-10-CM

## 2018-11-12 DIAGNOSIS — B96.89 BV (BACTERIAL VAGINOSIS): Primary | ICD-10-CM

## 2018-11-12 RX ORDER — METRONIDAZOLE 7.5 MG/G
1 GEL VAGINAL
Qty: 25 G | Refills: 0 | Status: SHIPPED | OUTPATIENT
Start: 2018-11-12 | End: 2019-07-17 | Stop reason: CLARIF

## 2018-11-21 ENCOUNTER — HOSPITAL ENCOUNTER (OUTPATIENT)
Dept: BONE DENSITY | Facility: CLINIC | Age: 63
Discharge: HOME | End: 2018-11-21
Payer: COMMERCIAL

## 2018-11-21 DIAGNOSIS — E55.9 VITAMIN D DEFICIENCY: ICD-10-CM

## 2018-11-21 PROCEDURE — 77080 DXA BONE DENSITY AXIAL: CPT

## 2019-01-07 DIAGNOSIS — K21.9 GASTROESOPHAGEAL REFLUX DISEASE WITHOUT ESOPHAGITIS: Primary | ICD-10-CM

## 2019-01-08 RX ORDER — PANTOPRAZOLE SODIUM 40 MG/1
40 TABLET, DELAYED RELEASE ORAL DAILY
Qty: 90 TABLET | Refills: 0 | Status: SHIPPED | OUTPATIENT
Start: 2019-01-08 | End: 2019-05-02 | Stop reason: SDUPTHER

## 2019-03-26 ENCOUNTER — TELEPHONE (OUTPATIENT)
Dept: FAMILY MEDICINE CLINIC | Facility: CLINIC | Age: 64
End: 2019-03-26

## 2019-05-02 DIAGNOSIS — K21.9 GASTROESOPHAGEAL REFLUX DISEASE WITHOUT ESOPHAGITIS: ICD-10-CM

## 2019-05-02 RX ORDER — PANTOPRAZOLE SODIUM 40 MG/1
TABLET, DELAYED RELEASE ORAL
Qty: 90 TABLET | Refills: 0 | Status: SHIPPED | OUTPATIENT
Start: 2019-05-02 | End: 2019-07-17 | Stop reason: SDUPTHER

## 2019-07-17 DIAGNOSIS — L71.9 ROSACEA: Primary | ICD-10-CM

## 2019-07-17 DIAGNOSIS — K21.9 GASTROESOPHAGEAL REFLUX DISEASE WITHOUT ESOPHAGITIS: ICD-10-CM

## 2019-07-17 DIAGNOSIS — R32 URINARY INCONTINENCE, UNSPECIFIED TYPE: ICD-10-CM

## 2019-07-17 DIAGNOSIS — E78.5 HYPERLIPIDEMIA, UNSPECIFIED HYPERLIPIDEMIA TYPE: ICD-10-CM

## 2019-07-17 DIAGNOSIS — R06.00 DYSPNEA, UNSPECIFIED TYPE: ICD-10-CM

## 2019-07-17 RX ORDER — PANTOPRAZOLE SODIUM 40 MG/1
40 TABLET, DELAYED RELEASE ORAL DAILY
Qty: 30 TABLET | Refills: 3 | Status: SHIPPED | OUTPATIENT
Start: 2019-07-17 | End: 2020-01-08 | Stop reason: SDUPTHER

## 2019-07-17 RX ORDER — ALBUTEROL SULFATE 90 UG/1
1 AEROSOL, METERED RESPIRATORY (INHALATION) EVERY 6 HOURS
Qty: 1 INHALER | Refills: 3 | Status: SHIPPED | OUTPATIENT
Start: 2019-07-17 | End: 2021-02-10 | Stop reason: SDUPTHER

## 2019-07-17 RX ORDER — ASPIRIN 325 MG
325 TABLET, DELAYED RELEASE (ENTERIC COATED) ORAL DAILY
Qty: 30 TABLET | Refills: 2 | Status: SHIPPED | OUTPATIENT
Start: 2019-07-17 | End: 2020-02-19 | Stop reason: SDUPTHER

## 2019-07-17 RX ORDER — METRONIDAZOLE 7.5 MG/G
GEL TOPICAL 2 TIMES DAILY
Qty: 45 G | Refills: 0 | Status: SHIPPED | OUTPATIENT
Start: 2019-07-17 | End: 2019-10-03 | Stop reason: ALTCHOICE

## 2019-07-17 RX ORDER — OXYBUTYNIN CHLORIDE 5 MG/1
5 TABLET ORAL 2 TIMES DAILY
Qty: 60 TABLET | Refills: 3 | Status: SHIPPED | OUTPATIENT
Start: 2019-07-17 | End: 2020-02-19 | Stop reason: SDUPTHER

## 2019-10-03 ENCOUNTER — OFFICE VISIT (OUTPATIENT)
Dept: FAMILY MEDICINE CLINIC | Facility: CLINIC | Age: 64
End: 2019-10-03
Payer: COMMERCIAL

## 2019-10-03 VITALS
HEIGHT: 55 IN | DIASTOLIC BLOOD PRESSURE: 88 MMHG | BODY MASS INDEX: 47.37 KG/M2 | TEMPERATURE: 97.7 F | OXYGEN SATURATION: 95 % | HEART RATE: 79 BPM | RESPIRATION RATE: 18 BRPM | SYSTOLIC BLOOD PRESSURE: 130 MMHG | WEIGHT: 204.7 LBS

## 2019-10-03 DIAGNOSIS — L60.0 INGROWN TOENAIL OF RIGHT FOOT WITH INFECTION: Primary | ICD-10-CM

## 2019-10-03 DIAGNOSIS — D17.23 LIPOMA OF RIGHT LOWER EXTREMITY: ICD-10-CM

## 2019-10-03 DIAGNOSIS — M72.2 PLANTAR FASCIITIS OF RIGHT FOOT: ICD-10-CM

## 2019-10-03 DIAGNOSIS — R31.1 BENIGN ESSENTIAL MICROSCOPIC HEMATURIA: ICD-10-CM

## 2019-10-03 LAB
BACTERIA UR QL AUTO: ABNORMAL /HPF
BILIRUB UR QL STRIP: NEGATIVE
CLARITY UR: CLEAR
COLOR UR: YELLOW
GLUCOSE UR STRIP-MCNC: NEGATIVE MG/DL
HGB UR QL STRIP.AUTO: ABNORMAL
KETONES UR STRIP-MCNC: NEGATIVE MG/DL
LEUKOCYTE ESTERASE UR QL STRIP: NEGATIVE
NITRITE UR QL STRIP: NEGATIVE
NON-SQ EPI CELLS URNS QL MICRO: ABNORMAL /HPF
PH UR STRIP.AUTO: 5.5 [PH]
PROT UR STRIP-MCNC: NEGATIVE MG/DL
RBC #/AREA URNS AUTO: ABNORMAL /HPF
SL AMB  POCT GLUCOSE, UA: NEGATIVE
SL AMB LEUKOCYTE ESTERASE,UA: NEGATIVE
SL AMB POCT BILIRUBIN,UA: NEGATIVE
SL AMB POCT BLOOD,UA: ABNORMAL
SL AMB POCT KETONES,UA: NEGATIVE
SL AMB POCT NITRITE,UA: NEGATIVE
SL AMB POCT PH,UA: 5
SL AMB POCT SPECIFIC GRAVITY,UA: 1.01
SL AMB POCT URINE PROTEIN: ABNORMAL
SL AMB POCT UROBILINOGEN: NEGATIVE
SP GR UR STRIP.AUTO: 1.02 (ref 1–1.03)
UROBILINOGEN UR QL STRIP.AUTO: 0.2 E.U./DL
WBC #/AREA URNS AUTO: ABNORMAL /HPF

## 2019-10-03 PROCEDURE — 81001 URINALYSIS AUTO W/SCOPE: CPT | Performed by: NURSE PRACTITIONER

## 2019-10-03 PROCEDURE — 99214 OFFICE O/P EST MOD 30 MIN: CPT | Performed by: NURSE PRACTITIONER

## 2019-10-03 PROCEDURE — 81002 URINALYSIS NONAUTO W/O SCOPE: CPT | Performed by: NURSE PRACTITIONER

## 2019-10-03 PROCEDURE — 3008F BODY MASS INDEX DOCD: CPT | Performed by: NURSE PRACTITIONER

## 2019-10-03 NOTE — PROGRESS NOTES
Assessment/Plan:         Diagnoses and all orders for this visit:    Ingrown toenail of right foot with infection  -     Ambulatory referral to Podiatry; Future    Lipoma of right lower extremity  -     Ambulatory referral to Podiatry; Future    Plantar fasciitis of right foot  -     Ambulatory referral to Podiatry; Future  Lupillo have her use aleve twice a day with tylenol   Benign essential microscopic hematuria  -     Urinalysis with reflex to microscopic; Future  -     Urinalysis with reflex to microscopic          Subjective:      Patient ID: Britt Bhagat is a 62 y o  female  HPI    Here for foot pain     Has 3 separate issues  INfected , red area the right 2nd toe--skin red   This just started  Top of foot with with lump and pain     Also hurt in arch when standing       Having burning and frequency of urination   Incontinence     The following portions of the patient's history were reviewed and updated as appropriate: allergies, current medications, past family history, past medical history, past social history, past surgical history and problem list     Review of Systems   Constitutional: Positive for activity change  Negative for appetite change, fatigue and fever  HENT: Negative for congestion, rhinorrhea and sore throat  Respiratory: Negative for cough  Cardiovascular: Positive for leg swelling  Negative for chest pain  Gastrointestinal: Negative for abdominal pain, constipation and diarrhea  Genitourinary: Positive for dysuria and urgency  Musculoskeletal:        Left foot :  Lump on top and painful  Ingrown 2nd toe with skin redness   Pain on bottom of the right foot      Neurological: Negative for dizziness, light-headedness, numbness and headaches           Objective:  Vitals:    10/03/19 1027   BP: 130/88   BP Location: Left arm   Patient Position: Sitting   Cuff Size: Adult   Pulse: 79   Resp: 18   Temp: 97 7 °F (36 5 °C)   TempSrc: Temporal   SpO2: 95%   Weight: 92 9 kg (204 lb 11 2 oz)   Height: 4' 7" (1 397 m)      Physical Exam   Constitutional: She appears well-developed and well-nourished  Feet:   Right Foot:   Skin Integrity: Positive for erythema  Skin:        Vitals reviewed

## 2019-10-10 ENCOUNTER — CLINICAL SUPPORT (OUTPATIENT)
Dept: FAMILY MEDICINE CLINIC | Facility: CLINIC | Age: 64
End: 2019-10-10

## 2019-10-10 DIAGNOSIS — R30.0 DYSURIA: Primary | ICD-10-CM

## 2019-10-10 LAB
BILIRUB UR QL STRIP: NEGATIVE
CLARITY UR: CLEAR
COLOR UR: YELLOW
GLUCOSE UR STRIP-MCNC: NEGATIVE MG/DL
HGB UR QL STRIP.AUTO: NEGATIVE
KETONES UR STRIP-MCNC: NEGATIVE MG/DL
LEUKOCYTE ESTERASE UR QL STRIP: NEGATIVE
NITRITE UR QL STRIP: NEGATIVE
PH UR STRIP.AUTO: 5.5 [PH]
PROT UR STRIP-MCNC: NEGATIVE MG/DL
SP GR UR STRIP.AUTO: 1.02 (ref 1–1.03)
UROBILINOGEN UR QL STRIP.AUTO: 0.2 E.U./DL

## 2019-10-10 PROCEDURE — 81003 URINALYSIS AUTO W/O SCOPE: CPT | Performed by: NURSE PRACTITIONER

## 2019-10-18 ENCOUNTER — HOSPITAL ENCOUNTER (OUTPATIENT)
Dept: MAMMOGRAPHY | Facility: CLINIC | Age: 64
Discharge: HOME | End: 2019-10-18
Payer: COMMERCIAL

## 2019-10-18 VITALS — HEIGHT: 55 IN | WEIGHT: 204 LBS | BODY MASS INDEX: 47.21 KG/M2

## 2019-10-18 DIAGNOSIS — Z12.39 SCREENING FOR MALIGNANT NEOPLASM OF BREAST: ICD-10-CM

## 2019-10-18 PROCEDURE — 77063 BREAST TOMOSYNTHESIS BI: CPT

## 2019-10-18 PROCEDURE — 77067 SCR MAMMO BI INCL CAD: CPT

## 2020-01-08 DIAGNOSIS — K21.9 GASTROESOPHAGEAL REFLUX DISEASE WITHOUT ESOPHAGITIS: ICD-10-CM

## 2020-01-08 RX ORDER — PANTOPRAZOLE SODIUM 40 MG/1
40 TABLET, DELAYED RELEASE ORAL DAILY
Qty: 30 TABLET | Refills: 3 | Status: SHIPPED | OUTPATIENT
Start: 2020-01-08 | End: 2020-05-10

## 2020-01-21 ENCOUNTER — OFFICE VISIT (OUTPATIENT)
Dept: FAMILY MEDICINE CLINIC | Facility: CLINIC | Age: 65
End: 2020-01-21
Payer: COMMERCIAL

## 2020-01-21 VITALS
SYSTOLIC BLOOD PRESSURE: 130 MMHG | DIASTOLIC BLOOD PRESSURE: 80 MMHG | HEIGHT: 55 IN | WEIGHT: 210 LBS | BODY MASS INDEX: 48.6 KG/M2 | TEMPERATURE: 98 F | HEART RATE: 80 BPM | RESPIRATION RATE: 18 BRPM

## 2020-01-21 DIAGNOSIS — R59.1 LYMPHADENOPATHY: ICD-10-CM

## 2020-01-21 DIAGNOSIS — H65.01 NON-RECURRENT ACUTE SEROUS OTITIS MEDIA OF RIGHT EAR: Primary | ICD-10-CM

## 2020-01-21 DIAGNOSIS — E66.01 MORBID OBESITY WITH BMI OF 45.0-49.9, ADULT (HCC): ICD-10-CM

## 2020-01-21 DIAGNOSIS — Z23 IMMUNIZATION DUE: ICD-10-CM

## 2020-01-21 PROCEDURE — 99214 OFFICE O/P EST MOD 30 MIN: CPT | Performed by: NURSE PRACTITIONER

## 2020-01-21 PROCEDURE — 90686 IIV4 VACC NO PRSV 0.5 ML IM: CPT

## 2020-01-21 PROCEDURE — 90471 IMMUNIZATION ADMIN: CPT

## 2020-01-21 PROCEDURE — 3725F SCREEN DEPRESSION PERFORMED: CPT

## 2020-01-21 PROCEDURE — 3008F BODY MASS INDEX DOCD: CPT | Performed by: NURSE PRACTITIONER

## 2020-01-21 RX ORDER — AZITHROMYCIN 250 MG/1
TABLET, FILM COATED ORAL
Qty: 6 TABLET | Refills: 0 | Status: SHIPPED | OUTPATIENT
Start: 2020-01-21 | End: 2020-01-25

## 2020-02-06 ENCOUNTER — OFFICE VISIT (OUTPATIENT)
Dept: FAMILY MEDICINE CLINIC | Facility: CLINIC | Age: 65
End: 2020-02-06
Payer: COMMERCIAL

## 2020-02-06 VITALS
TEMPERATURE: 98.5 F | DIASTOLIC BLOOD PRESSURE: 76 MMHG | BODY MASS INDEX: 45.41 KG/M2 | HEIGHT: 57 IN | WEIGHT: 210.5 LBS | RESPIRATION RATE: 18 BRPM | HEART RATE: 63 BPM | SYSTOLIC BLOOD PRESSURE: 118 MMHG | OXYGEN SATURATION: 97 %

## 2020-02-06 DIAGNOSIS — E66.01 MORBID OBESITY WITH BMI OF 45.0-49.9, ADULT (HCC): ICD-10-CM

## 2020-02-06 DIAGNOSIS — K63.5 COLORECTAL POLYP DETECTED ON COLONOSCOPY: ICD-10-CM

## 2020-02-06 DIAGNOSIS — Z11.59 SCREENING FOR VIRAL DISEASE: ICD-10-CM

## 2020-02-06 DIAGNOSIS — Z00.00 ANNUAL PHYSICAL EXAM: Primary | ICD-10-CM

## 2020-02-06 LAB
ALBUMIN SERPL BCP-MCNC: 3.4 G/DL (ref 3.5–5)
ALP SERPL-CCNC: 64 U/L (ref 46–116)
ALT SERPL W P-5'-P-CCNC: 58 U/L (ref 12–78)
ANION GAP SERPL CALCULATED.3IONS-SCNC: 5 MMOL/L (ref 4–13)
AST SERPL W P-5'-P-CCNC: 50 U/L (ref 5–45)
BASOPHILS # BLD AUTO: 0.05 THOUSANDS/ΜL (ref 0–0.1)
BASOPHILS NFR BLD AUTO: 1 % (ref 0–1)
BILIRUB SERPL-MCNC: 0.52 MG/DL (ref 0.2–1)
BUN SERPL-MCNC: 19 MG/DL (ref 5–25)
CALCIUM SERPL-MCNC: 9.6 MG/DL (ref 8.3–10.1)
CHLORIDE SERPL-SCNC: 110 MMOL/L (ref 100–108)
CHOLEST SERPL-MCNC: 204 MG/DL (ref 50–200)
CO2 SERPL-SCNC: 27 MMOL/L (ref 21–32)
CREAT SERPL-MCNC: 1.02 MG/DL (ref 0.6–1.3)
EOSINOPHIL # BLD AUTO: 0.18 THOUSAND/ΜL (ref 0–0.61)
EOSINOPHIL NFR BLD AUTO: 3 % (ref 0–6)
ERYTHROCYTE [DISTWIDTH] IN BLOOD BY AUTOMATED COUNT: 13.5 % (ref 11.6–15.1)
GFR SERPL CREATININE-BSD FRML MDRD: 59 ML/MIN/1.73SQ M
GLUCOSE P FAST SERPL-MCNC: 108 MG/DL (ref 65–99)
HCT VFR BLD AUTO: 43.8 % (ref 34.8–46.1)
HDLC SERPL-MCNC: 53 MG/DL
HGB BLD-MCNC: 14.3 G/DL (ref 11.5–15.4)
IMM GRANULOCYTES # BLD AUTO: 0.02 THOUSAND/UL (ref 0–0.2)
IMM GRANULOCYTES NFR BLD AUTO: 0 % (ref 0–2)
LDLC SERPL CALC-MCNC: 118 MG/DL (ref 0–100)
LYMPHOCYTES # BLD AUTO: 1.6 THOUSANDS/ΜL (ref 0.6–4.47)
LYMPHOCYTES NFR BLD AUTO: 30 % (ref 14–44)
MCH RBC QN AUTO: 30.3 PG (ref 26.8–34.3)
MCHC RBC AUTO-ENTMCNC: 32.6 G/DL (ref 31.4–37.4)
MCV RBC AUTO: 93 FL (ref 82–98)
MONOCYTES # BLD AUTO: 0.27 THOUSAND/ΜL (ref 0.17–1.22)
MONOCYTES NFR BLD AUTO: 5 % (ref 4–12)
NEUTROPHILS # BLD AUTO: 3.31 THOUSANDS/ΜL (ref 1.85–7.62)
NEUTS SEG NFR BLD AUTO: 61 % (ref 43–75)
NONHDLC SERPL-MCNC: 151 MG/DL
NRBC BLD AUTO-RTO: 0 /100 WBCS
PLATELET # BLD AUTO: 154 THOUSANDS/UL (ref 149–390)
PMV BLD AUTO: 11.3 FL (ref 8.9–12.7)
POTASSIUM SERPL-SCNC: 4.5 MMOL/L (ref 3.5–5.3)
PROT SERPL-MCNC: 7.9 G/DL (ref 6.4–8.2)
RBC # BLD AUTO: 4.72 MILLION/UL (ref 3.81–5.12)
SODIUM SERPL-SCNC: 142 MMOL/L (ref 136–145)
TRIGL SERPL-MCNC: 166 MG/DL
TSH SERPL DL<=0.05 MIU/L-ACNC: 3.46 UIU/ML (ref 0.36–3.74)
WBC # BLD AUTO: 5.43 THOUSAND/UL (ref 4.31–10.16)

## 2020-02-06 PROCEDURE — 36415 COLL VENOUS BLD VENIPUNCTURE: CPT | Performed by: NURSE PRACTITIONER

## 2020-02-06 PROCEDURE — 80061 LIPID PANEL: CPT | Performed by: NURSE PRACTITIONER

## 2020-02-06 PROCEDURE — 84443 ASSAY THYROID STIM HORMONE: CPT | Performed by: NURSE PRACTITIONER

## 2020-02-06 PROCEDURE — 99396 PREV VISIT EST AGE 40-64: CPT | Performed by: NURSE PRACTITIONER

## 2020-02-06 PROCEDURE — 85025 COMPLETE CBC W/AUTO DIFF WBC: CPT | Performed by: NURSE PRACTITIONER

## 2020-02-06 PROCEDURE — 87389 HIV-1 AG W/HIV-1&-2 AB AG IA: CPT | Performed by: NURSE PRACTITIONER

## 2020-02-06 PROCEDURE — 80053 COMPREHEN METABOLIC PANEL: CPT | Performed by: NURSE PRACTITIONER

## 2020-02-06 NOTE — PATIENT INSTRUCTIONS

## 2020-02-06 NOTE — PROGRESS NOTES
ADULT ANNUAL PHYSICAL  [facility]    NAME: Simón Magaña  AGE: 62 y o  SEX: female  : 1955     DATE: 2020     Assessment and Plan:     Problem List Items Addressed This Visit     None      Visit Diagnoses     Annual physical exam    -  Primary    Relevant Orders    TSH, 3rd generation with Free T4 reflex    Lipid panel    Comprehensive metabolic panel    CBC and differential    Screening for viral disease        Relevant Orders    HIV 1/2 AG-AB combo    Morbid obesity with BMI of 45 0-49 9, adult ([address] )        Relevant Orders    TSH, 3rd generation with Free T4 reflex    Lipid panel    Comprehensive metabolic panel          Immunizations and preventive care screenings were discussed with patient today  Appropriate education was printed on patient's after visit summary  Counseling:  Alcohol/drug use: discussed moderation in alcohol intake, the recommendations for healthy alcohol use, and avoidance of illicit drug use  · Exercise: the importance of regular exercise/physical activity was discussed  Recommend exercise 3-5 times per week for at least 30 minutes  Return in about 1 year (around 2021) for Annual physical      Chief Complaint:     Chief Complaint   Patient presents with    Annual Exam      History of Present Illness:     Adult Annual Physical   Patient here for a comprehensive physical exam  The patient reports problems - right now has diarrhea   She thinks related to what she is eating  Eating hamburgers and pizza  NO pain       Diet and Physical Activity  · Diet/Nutrition: poor diet  · Exercise: no formal exercise  Depression Screening  PHQ-9 Depression Screening    PHQ-9:    Frequency of the following problems over the past two weeks:            General Health  · Sleep: sleeps well  · Hearing: normal - bilateral   · Vision: most recent eye exam >1 year ago and wears glasses     · Dental: dentures and don't fit  /GYN Health  · Patient is: postmenopausal  ·   ·   Review of Systems:     Review of Systems   Constitutional: Negative for activity change, appetite change, fatigue, fever and unexpected weight change  HENT: Negative for congestion, dental problem, postnasal drip, rhinorrhea and sneezing  Eyes: Negative for discharge and visual disturbance  Respiratory: Negative for cough and wheezing  Cardiovascular: Negative for chest pain and leg swelling  Gastrointestinal: Positive for diarrhea  Negative for abdominal pain, constipation, nausea and vomiting  Endocrine: Negative for polydipsia and polyuria  Genitourinary: Negative for dysuria, frequency and urgency  Musculoskeletal: Negative for arthralgias and back pain  Skin: Negative for rash  Allergic/Immunologic: Negative for environmental allergies and food allergies  Neurological: Negative for numbness and headaches  Hematological: Negative for adenopathy  Psychiatric/Behavioral: Negative for behavioral problems and sleep disturbance  The patient is not nervous/anxious         Past Medical History:     Past Medical History:   Diagnosis Date    Asthma     Lateral epicondylitis 10/9/2015      Past Surgical History:     Past Surgical History:   Procedure Laterality Date    IMPLANTATION VAGAL NERVE STIMULATOR        Social History:     Social History     Socioeconomic History    Marital status: Single     Spouse name: None    Number of children: None    Years of education: None    Highest education level: None   Occupational History    None   Social Needs    Financial resource strain: None    Food insecurity:     Worry: None     Inability: None    Transportation needs:     Medical: None     Non-medical: None   Tobacco Use    Smoking status: Former Smoker     Packs/day: 1 00     Types: Cigarettes     Last attempt to quit:      Years since quittin 1    Smokeless tobacco: Former User   Substance and Sexual Activity    Alcohol use: No    Drug use: No    Sexual activity: None   Lifestyle    Physical activity:     Days per week: None     Minutes per session: None    Stress: None   Relationships    Social connections:     Talks on phone: None     Gets together: None     Attends [employer] service: None     Active member of club or organization: None     Attends meetings of clubs or organizations: None     Relationship status: None    Intimate partner violence:     Fear of current or ex partner: None     Emotionally abused: None     Physically abused: None     Forced sexual activity: None   Other Topics Concern    None   Social History Narrative    None      Family History:     Family History   Problem Relation Age of Onset    No Known Problems Mother     No Known Problems Sister     No Known Problems Maternal Grandmother     No Known Problems Paternal Grandmother     Cancer Paternal Uncle       Current Medications:     Current Outpatient Medications   Medication Sig Dispense Refill    albuterol (PROVENTIL HFA,VENTOLIN HFA) 90 mcg/act inhaler Inhale 1 puff every 6 (six) hours 1 Inhaler 3    aspirin (ECOTRIN) 325 mg EC tablet Take 1 tablet (325 mg total) by mouth daily 30 tablet 2    oxybutynin (DITROPAN) 5 mg tablet Take 1 tablet (5 mg total) by mouth 2 (two) times a day 60 tablet 3    pantoprazole (PROTONIX) 40 mg tablet Take 1 tablet (40 mg total) by mouth daily 30 tablet 3     No current facility-administered medications for this visit  Allergies:      Allergies   Allergen Reactions    Lansoprazole Palpitations     Erythema/ tachycardia  Other reaction(s): tachycardia    Penicillins Itching and Rash     Other reaction(s): Rash  Other reaction(s): Rash      Physical Exam:     /76 (BP Location: Left arm, Patient Position: Sitting, Cuff Size: Large)   Pulse 63   Temp 98 5 °F (36 9 °C) (Temporal)   Resp 18   Ht 4' 8 5" (1 435 m)   Wt 95 5 kg (210 lb 8 oz)   SpO2 97%   BMI 46 36 kg/m² Physical Exam   Constitutional: She is oriented to person, place, and time  She appears well-developed and well-nourished  HENT:   Head: Normocephalic  Right Ear: External ear normal    Left Ear: External ear normal    Nose: Nose normal    Eyes: Pupils are equal, round, and reactive to light  Conjunctivae are normal  Right eye exhibits no discharge  Left eye exhibits no discharge  Neck: Normal range of motion  Neck supple  No thyromegaly present  Cardiovascular: Normal rate, regular rhythm and normal heart sounds  No murmur heard  Pulmonary/Chest: Effort normal and breath sounds normal    Abdominal: Soft  Bowel sounds are normal  There is tenderness in the epigastric area  Musculoskeletal: Normal range of motion  Lymphadenopathy:     She has no cervical adenopathy  Neurological: She is alert and oriented to person, place, and time  Skin: Skin is warm  No rash noted  Psychiatric: She has a normal mood and affect  Her behavior is normal    Vitals reviewed        Tootie Christianson, [address]

## 2020-02-07 LAB — HIV 1+2 AB+HIV1 P24 AG SERPL QL IA: NORMAL

## 2020-02-19 DIAGNOSIS — E78.5 HYPERLIPIDEMIA, UNSPECIFIED HYPERLIPIDEMIA TYPE: ICD-10-CM

## 2020-02-19 DIAGNOSIS — R32 URINARY INCONTINENCE, UNSPECIFIED TYPE: ICD-10-CM

## 2020-02-19 RX ORDER — OXYBUTYNIN CHLORIDE 5 MG/1
5 TABLET ORAL 2 TIMES DAILY
Qty: 60 TABLET | Refills: 3 | Status: SHIPPED | OUTPATIENT
Start: 2020-02-19 | End: 2020-12-29 | Stop reason: SDUPTHER

## 2020-02-19 RX ORDER — ASPIRIN 325 MG
325 TABLET, DELAYED RELEASE (ENTERIC COATED) ORAL DAILY
Qty: 30 TABLET | Refills: 2 | Status: SHIPPED | OUTPATIENT
Start: 2020-02-19 | End: 2020-02-21 | Stop reason: SDUPTHER

## 2020-02-21 DIAGNOSIS — E78.5 HYPERLIPIDEMIA, UNSPECIFIED HYPERLIPIDEMIA TYPE: ICD-10-CM

## 2020-02-21 RX ORDER — ASPIRIN 325 MG
325 TABLET, DELAYED RELEASE (ENTERIC COATED) ORAL DAILY
Qty: 30 TABLET | Refills: 2 | Status: SHIPPED | OUTPATIENT
Start: 2020-02-21 | End: 2020-10-16 | Stop reason: SDUPTHER

## 2020-04-15 ENCOUNTER — TELEPHONE (OUTPATIENT)
Dept: FAMILY MEDICINE CLINIC | Facility: CLINIC | Age: 65
End: 2020-04-15

## 2020-04-15 DIAGNOSIS — L71.9 ROSACEA: Primary | ICD-10-CM

## 2020-04-15 RX ORDER — METRONIDAZOLE 7.5 MG/G
GEL TOPICAL 2 TIMES DAILY
Qty: 45 G | Refills: 2 | Status: SHIPPED | OUTPATIENT
Start: 2020-04-15 | End: 2020-07-28 | Stop reason: SDUPTHER

## 2020-05-08 DIAGNOSIS — K21.9 GASTROESOPHAGEAL REFLUX DISEASE WITHOUT ESOPHAGITIS: ICD-10-CM

## 2020-05-10 RX ORDER — PANTOPRAZOLE SODIUM 40 MG/1
TABLET, DELAYED RELEASE ORAL
Qty: 90 TABLET | Refills: 3 | Status: SHIPPED | OUTPATIENT
Start: 2020-05-10 | End: 2020-07-28 | Stop reason: SDUPTHER

## 2020-07-28 ENCOUNTER — TELEPHONE (OUTPATIENT)
Dept: FAMILY MEDICINE CLINIC | Facility: CLINIC | Age: 65
End: 2020-07-28

## 2020-07-28 DIAGNOSIS — L71.9 ROSACEA: Primary | ICD-10-CM

## 2020-07-28 DIAGNOSIS — L71.9 ROSACEA: ICD-10-CM

## 2020-07-28 DIAGNOSIS — K21.9 GASTROESOPHAGEAL REFLUX DISEASE WITHOUT ESOPHAGITIS: ICD-10-CM

## 2020-07-28 RX ORDER — CLINDAMYCIN PHOSPHATE 10 UG/ML
LOTION TOPICAL 2 TIMES DAILY
Qty: 60 ML | Refills: 1 | Status: SHIPPED | OUTPATIENT
Start: 2020-07-28 | End: 2022-03-02 | Stop reason: SDUPTHER

## 2020-07-28 RX ORDER — PANTOPRAZOLE SODIUM 40 MG/1
40 TABLET, DELAYED RELEASE ORAL DAILY
Qty: 90 TABLET | Refills: 3 | Status: SHIPPED | OUTPATIENT
Start: 2020-07-28 | End: 2021-08-12

## 2020-07-28 RX ORDER — METRONIDAZOLE 7.5 MG/G
GEL TOPICAL 2 TIMES DAILY
Qty: 45 G | Refills: 2 | Status: SHIPPED | OUTPATIENT
Start: 2020-07-28 | End: 2021-06-17 | Stop reason: ALTCHOICE

## 2020-07-28 NOTE — TELEPHONE ENCOUNTER
HPI     DLSL:4/2/19  pt states no problems with his [facility] pt didn't bring his bifocals with his   only brought his single [facility] glasses in.   No f/f  Hx cats  OTc gtst   LBS: 115  Hemoglobin A1C       Date                     Value               Ref Range             Status                    02/04/2020               8.6 (H)             4.0 - 5.6 %           Final                       10/31/2019               8.1 (H)             4.0 - 5.6 %           Final                   Last edited by Jonatan Buckley, OD on 7/17/2020  7:42 AM. (History)        ROS     Positive for: Endocrine (DM)    Negative for: Constitutional, Gastrointestinal, Neurological, Skin,   Genitourinary, Musculoskeletal, HENT, Cardiovascular, Eyes, Respiratory,   Psychiatric, Allergic/Imm, Heme/Lymph    Last edited by Jonatan Buckley, OD on 7/17/2020  7:42 AM. (History)        Assessment /Plan     For exam results, see Encounter Report.    Type 2 diabetes mellitus without retinopathy    Nuclear sclerosis, bilateral    Glaucoma screening    Astigmatism with presbyopia, bilateral      1. Cat OU--stable.  Pt happy w spex  2. DM- WITHOUT RETINOPATHY.  Advised yearly DFE    PLAN:    rtc 1 yr                  Sent in clindamycin

## 2020-10-16 DIAGNOSIS — E78.5 HYPERLIPIDEMIA, UNSPECIFIED HYPERLIPIDEMIA TYPE: ICD-10-CM

## 2020-10-16 RX ORDER — ASPIRIN 325 MG
325 TABLET, DELAYED RELEASE (ENTERIC COATED) ORAL DAILY
Qty: 30 TABLET | Refills: 2 | Status: SHIPPED | OUTPATIENT
Start: 2020-10-16

## 2020-11-02 ENCOUNTER — CLINICAL SUPPORT (OUTPATIENT)
Dept: FAMILY MEDICINE CLINIC | Facility: CLINIC | Age: 65
End: 2020-11-02
Payer: COMMERCIAL

## 2020-11-02 DIAGNOSIS — Z23 NEED FOR INFLUENZA VACCINATION: Primary | ICD-10-CM

## 2020-11-02 PROCEDURE — 90682 RIV4 VACC RECOMBINANT DNA IM: CPT | Performed by: NURSE PRACTITIONER

## 2020-11-02 PROCEDURE — 90471 IMMUNIZATION ADMIN: CPT | Performed by: NURSE PRACTITIONER

## 2020-12-29 DIAGNOSIS — R32 URINARY INCONTINENCE, UNSPECIFIED TYPE: ICD-10-CM

## 2020-12-29 RX ORDER — OXYBUTYNIN CHLORIDE 5 MG/1
5 TABLET ORAL 2 TIMES DAILY
Qty: 60 TABLET | Refills: 3 | Status: SHIPPED | OUTPATIENT
Start: 2020-12-29 | End: 2021-05-17 | Stop reason: SDUPTHER

## 2021-02-10 DIAGNOSIS — R06.00 DYSPNEA, UNSPECIFIED TYPE: ICD-10-CM

## 2021-02-10 RX ORDER — ALBUTEROL SULFATE 90 UG/1
1 AEROSOL, METERED RESPIRATORY (INHALATION) EVERY 6 HOURS
Qty: 1 INHALER | Refills: 3 | Status: SHIPPED | OUTPATIENT
Start: 2021-02-10 | End: 2021-02-11 | Stop reason: SDUPTHER

## 2021-02-11 DIAGNOSIS — R06.00 DYSPNEA, UNSPECIFIED TYPE: ICD-10-CM

## 2021-02-11 RX ORDER — ALBUTEROL SULFATE 90 UG/1
1 AEROSOL, METERED RESPIRATORY (INHALATION) EVERY 6 HOURS
Qty: 1 INHALER | Refills: 3 | Status: SHIPPED | OUTPATIENT
Start: 2021-02-11 | End: 2022-03-02 | Stop reason: SDUPTHER

## 2021-02-13 ENCOUNTER — APPOINTMENT (EMERGENCY)
Dept: RADIOLOGY | Facility: HOSPITAL | Age: 66
End: 2021-02-13
Payer: COMMERCIAL

## 2021-02-13 ENCOUNTER — HOSPITAL ENCOUNTER (EMERGENCY)
Facility: HOSPITAL | Age: 66
Discharge: HOME | End: 2021-02-13
Attending: EMERGENCY MEDICINE | Admitting: EMERGENCY MEDICINE
Payer: COMMERCIAL

## 2021-02-13 VITALS
WEIGHT: 211.86 LBS | DIASTOLIC BLOOD PRESSURE: 96 MMHG | RESPIRATION RATE: 16 BRPM | BODY MASS INDEX: 46.66 KG/M2 | TEMPERATURE: 99.6 F | HEART RATE: 94 BPM | SYSTOLIC BLOOD PRESSURE: 173 MMHG | OXYGEN SATURATION: 97 %

## 2021-02-13 DIAGNOSIS — M10.9 GOUT: ICD-10-CM

## 2021-02-13 DIAGNOSIS — I10 HTN (HYPERTENSION): ICD-10-CM

## 2021-02-13 DIAGNOSIS — M79.671 FOOT PAIN, RIGHT: Primary | ICD-10-CM

## 2021-02-13 LAB
ANION GAP SERPL CALCULATED.3IONS-SCNC: 9 MMOL/L (ref 5–14)
BASOPHILS # BLD AUTO: 0 THOUSANDS/ΜL (ref 0–0.1)
BASOPHILS NFR BLD AUTO: 1 % (ref 0–1)
BUN SERPL-MCNC: 15 MG/DL (ref 5–25)
CALCIUM SERPL-MCNC: 10 MG/DL (ref 8.4–10.2)
CHLORIDE SERPL-SCNC: 106 MMOL/L (ref 97–108)
CO2 SERPL-SCNC: 28 MMOL/L (ref 22–30)
CREAT SERPL-MCNC: 1.02 MG/DL (ref 0.6–1.2)
EOSINOPHIL # BLD AUTO: 0.2 THOUSAND/ΜL (ref 0–0.4)
EOSINOPHIL NFR BLD AUTO: 3 % (ref 0–6)
ERYTHROCYTE [DISTWIDTH] IN BLOOD BY AUTOMATED COUNT: 13.8 %
GFR SERPL CREATININE-BSD FRML MDRD: 58 ML/MIN/1.73SQ M
GLUCOSE SERPL-MCNC: 103 MG/DL (ref 70–99)
HCT VFR BLD AUTO: 43.2 % (ref 36–46)
HGB BLD-MCNC: 14.6 G/DL (ref 12–16)
LYMPHOCYTES # BLD AUTO: 1.9 THOUSANDS/ΜL (ref 0.5–4)
LYMPHOCYTES NFR BLD AUTO: 23 % (ref 25–45)
MCH RBC QN AUTO: 30.4 PG (ref 26–34)
MCHC RBC AUTO-ENTMCNC: 33.7 G/DL (ref 31–36)
MCV RBC AUTO: 90 FL (ref 80–100)
MONOCYTES # BLD AUTO: 0.6 THOUSAND/ΜL (ref 0.2–0.9)
MONOCYTES NFR BLD AUTO: 7 % (ref 1–10)
NEUTROPHILS # BLD AUTO: 5.5 THOUSANDS/ΜL (ref 1.8–7.8)
NEUTS SEG NFR BLD AUTO: 67 % (ref 45–65)
NT-PROBNP SERPL-MCNC: 58.9 PG/ML (ref 0–299)
PLATELET # BLD AUTO: 164 THOUSANDS/UL (ref 150–450)
PMV BLD AUTO: 9.1 FL (ref 8.9–12.7)
POTASSIUM SERPL-SCNC: 3.8 MMOL/L (ref 3.6–5)
RBC # BLD AUTO: 4.79 MILLION/UL (ref 4–5.2)
SODIUM SERPL-SCNC: 143 MMOL/L (ref 137–147)
URATE SERPL-MCNC: 9.3 MG/DL (ref 2.7–7.5)
WBC # BLD AUTO: 8.1 THOUSAND/UL (ref 4.5–11)

## 2021-02-13 PROCEDURE — 99284 EMERGENCY DEPT VISIT MOD MDM: CPT

## 2021-02-13 PROCEDURE — 99284 EMERGENCY DEPT VISIT MOD MDM: CPT | Performed by: EMERGENCY MEDICINE

## 2021-02-13 PROCEDURE — 85025 COMPLETE CBC W/AUTO DIFF WBC: CPT | Performed by: EMERGENCY MEDICINE

## 2021-02-13 PROCEDURE — 36415 COLL VENOUS BLD VENIPUNCTURE: CPT | Performed by: EMERGENCY MEDICINE

## 2021-02-13 PROCEDURE — 84550 ASSAY OF BLOOD/URIC ACID: CPT | Performed by: EMERGENCY MEDICINE

## 2021-02-13 PROCEDURE — 73630 X-RAY EXAM OF FOOT: CPT

## 2021-02-13 PROCEDURE — 83880 ASSAY OF NATRIURETIC PEPTIDE: CPT | Performed by: EMERGENCY MEDICINE

## 2021-02-13 PROCEDURE — 80048 BASIC METABOLIC PNL TOTAL CA: CPT | Performed by: EMERGENCY MEDICINE

## 2021-02-13 RX ORDER — INDOMETHACIN 25 MG/1
25 CAPSULE ORAL ONCE
Status: COMPLETED | OUTPATIENT
Start: 2021-02-13 | End: 2021-02-13

## 2021-02-13 RX ORDER — ACETAMINOPHEN 325 MG/1
650 TABLET ORAL ONCE
Status: COMPLETED | OUTPATIENT
Start: 2021-02-13 | End: 2021-02-13

## 2021-02-13 RX ADMIN — INDOMETHACIN 25 MG: 25 CAPSULE ORAL at 22:34

## 2021-02-13 RX ADMIN — ACETAMINOPHEN 650 MG: 325 TABLET ORAL at 21:53

## 2021-02-14 NOTE — ED PROVIDER NOTES
nHistory  Chief Complaint   Patient presents with    Foot Pain     Right foot pain times 2 days, denies any trauma  Patient is a 43-year-old female with a history of asthma coming in today complaining right foot pain  This was non traumatic in nature  She has no fevers, chills, chest pain, shortness of breath palpitations  No syncope  Pain is not worse in the morning  She took Motrin with minimal relief  She has no swelling or changes in medication  She has not changed her shoes  She describes it is a deep achy pain along base of her foot      History provided by:  Patient and EMS personnel   used: No    Toe Pain  Location:  MT 2-4  Severity:  Moderate  Onset quality:  Gradual  Duration:  2 days  Timing:  Constant  Progression:  Unchanged  Relieved by:  Motrin  Worsened by:  Weight bearing  Associated symptoms: no abdominal pain, no chest pain, no cough, no diarrhea, no ear pain, no fever, no rash, no shortness of breath, no sore throat and no vomiting        Prior to Admission Medications   Prescriptions Last Dose Informant Patient Reported? Taking?    albuterol (PROVENTIL HFA,VENTOLIN HFA) 90 mcg/act inhaler   No No   Sig: Inhale 1 puff every 6 (six) hours   aspirin (ECOTRIN) 325 mg EC tablet   No No   Sig: Take 1 tablet (325 mg total) by mouth daily   clindamycin (CLEOCIN T) 1 % lotion   No No   Sig: Apply topically 2 (two) times a day   metroNIDAZOLE (METROGEL) 0 75 % gel   No No   Sig: Apply topically 2 (two) times a day   oxybutynin (DITROPAN) 5 mg tablet   No No   Sig: Take 1 tablet (5 mg total) by mouth 2 (two) times a day   pantoprazole (PROTONIX) 40 mg tablet   No No   Sig: Take 1 tablet (40 mg total) by mouth daily      Facility-Administered Medications: None       Past Medical History:   Diagnosis Date    Asthma     Lateral epicondylitis 10/9/2015       Past Surgical History:   Procedure Laterality Date    IMPLANTATION VAGAL NERVE STIMULATOR         Family History Problem Relation Age of Onset    No Known Problems Mother     No Known Problems Sister     No Known Problems Maternal Grandmother     No Known Problems Paternal Grandmother     Cancer Paternal Uncle      I have reviewed and agree with the history as documented  E-Cigarette/Vaping    E-Cigarette Use Never User      E-Cigarette/Vaping Substances     Social History     Tobacco Use    Smoking status: Former Smoker     Packs/day: 1 00     Types: Cigarettes     Quit date:      Years since quittin 1    Smokeless tobacco: Former User   Substance Use Topics    Alcohol use: No    Drug use: No       Review of Systems   Constitutional: Negative  Negative for chills and fever  HENT: Negative  Negative for ear pain and sore throat  Eyes: Negative for pain and visual disturbance  Respiratory: Negative  Negative for cough and shortness of breath  Cardiovascular: Negative  Negative for chest pain and palpitations  Gastrointestinal: Negative  Negative for abdominal pain, diarrhea and vomiting  Genitourinary: Negative  Negative for dysuria and hematuria  Musculoskeletal: Negative for arthralgias and back pain  Skin: Negative for color change and rash  Neurological: Negative  Negative for seizures and syncope  Hematological: Negative  Psychiatric/Behavioral: Negative  All other systems reviewed and are negative  Physical Exam  Physical Exam  Vitals signs and nursing note reviewed  Constitutional:       General: She is not in acute distress  Appearance: She is well-developed  HENT:      Head: Normocephalic and atraumatic  Eyes:      Extraocular Movements: Extraocular movements intact  Pupils: Pupils are equal, round, and reactive to light  Neck:      Musculoskeletal: Neck supple  Pulmonary:      Effort: Pulmonary effort is normal  No respiratory distress  Abdominal:      Tenderness: There is no abdominal tenderness     Musculoskeletal:      Right hip: Normal       Right knee: Normal       Right ankle: Normal         Feet:    Skin:     General: Skin is warm and dry  Capillary Refill: Capillary refill takes less than 2 seconds  Neurological:      General: No focal deficit present  Mental Status: She is alert and oriented to person, place, and time  Comments: GCS 15  No slurred speech    No facial asymmetry     Psychiatric:         Mood and Affect: Mood normal          Behavior: Behavior normal          Vital Signs  ED Triage Vitals   Temperature Pulse Respirations Blood Pressure SpO2   02/13/21 2123 02/13/21 2123 02/13/21 2123 02/13/21 2123 02/13/21 2123   99 6 °F (37 6 °C) 94 16 (!) 173/96 97 %      Temp Source Heart Rate Source Patient Position - Orthostatic VS BP Location FiO2 (%)   02/13/21 2123 02/13/21 2123 02/13/21 2123 02/13/21 2123 --   Tympanic Monitor Lying Left arm       Pain Score       02/13/21 2153       7           Vitals:    02/13/21 2123   BP: (!) 173/96   Pulse: 94   Patient Position - Orthostatic VS: Lying         Visual Acuity      ED Medications  Medications   acetaminophen (TYLENOL) tablet 650 mg (650 mg Oral Given 2/13/21 2153)   indomethacin (INDOCIN) capsule 25 mg (25 mg Oral Given 2/13/21 2234)       Diagnostic Studies  Results Reviewed     Procedure Component Value Units Date/Time    NT-BNP PRO [[id number]]  (Normal) Collected: 02/13/21 2153    Lab Status: Final result Specimen: Blood from Arm, Right Updated: 02/13/21 2222     NT-proBNP 58 9 pg/mL     Basic metabolic panel [[id number]]  (Abnormal) Collected: 02/13/21 2153    Lab Status: Final result Specimen: Blood from Arm, Right Updated: 02/13/21 2213     Sodium 143 mmol/L      Potassium 3 8 mmol/L      Chloride 106 mmol/L      CO2 28 mmol/L      ANION GAP 9 mmol/L      BUN 15 mg/dL      Creatinine 1 02 mg/dL      Glucose 103 mg/dL      Calcium 10 0 mg/dL      eGFR 58 ml/min/1 73sq m     Narrative:      [place] guidelines for Chronic Kidney Disease (CKD):     Stage 1 with normal or high GFR (GFR > 90 mL/min/1 73 square meters)    Stage 2 Mild CKD (GFR = 60-89 mL/min/1 73 square meters)    Stage 3A Moderate CKD (GFR = 45-59 mL/min/1 73 square meters)    Stage 3B Moderate CKD (GFR = 30-44 mL/min/1 73 square meters)    Stage 4 Severe CKD (GFR = 15-29 mL/min/1 73 square meters)    Stage 5 End Stage CKD (GFR <15 mL/min/1 73 square meters)  Note: GFR calculation is accurate only with a steady state creatinine    Uric acid [[id number]]  (Abnormal) Collected: 02/13/21 2153    Lab Status: Final result Specimen: Blood from Arm, Right Updated: 02/13/21 2211     Uric Acid 9 3 mg/dL     CBC and differential [[id number]]  (Abnormal) Collected: 02/13/21 2153    Lab Status: Final result Specimen: Blood from Arm, Right Updated: 02/13/21 2204     WBC 8 10 Thousand/uL      RBC 4 79 Million/uL      Hemoglobin 14 6 g/dL      Hematocrit 43 2 %      MCV 90 fL      MCH 30 4 pg      MCHC 33 7 g/dL      RDW 13 8 %      MPV 9 1 fL      Platelets 361 Thousands/uL      Neutrophils Relative 67 %      Lymphocytes Relative 23 %      Monocytes Relative 7 %      Eosinophils Relative 3 %      Basophils Relative 1 %      Neutrophils Absolute 5 50 Thousands/µL      Lymphocytes Absolute 1 90 Thousands/µL      Monocytes Absolute 0 60 Thousand/µL      Eosinophils Absolute 0 20 Thousand/µL      Basophils Absolute 0 00 Thousands/µL                  XR foot 3+ views RIGHT    (Results Pending)              Procedures  Procedures         ED Course  ED Course as of Feb 13 2235   Sat Feb 13, 2021 2127 Patient is a 29-year-old female coming in today with right foot pain for past several days  On exam she does have mild swelling and tenderness the metatarsal region  There is no obvious deformity, erythema or concern for injury  Will obtain x-rays, labs, and uric acid      Portions of the record may have been created with voice recognition software   Occasional wrong word or "sound a like" substitutions may have occurred due to the inherent limitations of voice recognition software  Read the chart carefully and recognize, using context, where substitutions have occurred  66 Given age, presumed undiagnosed hypertension and patient has been drinking a lot of diet soda is as well as red meat concern for gout  Will refer to Podiatry                                SBIRT 20yo+      Most Recent Value   SBIRT (22 yo +)   In order to provide better care to our patients, we are screening all of our patients for alcohol and drug use  Would it be okay to ask you these screening questions? Yes Filed at: 02/13/2021 2205   Initial Alcohol Screen: US AUDIT-C    1  How often do you have a drink containing alcohol?  0 Filed at: 02/13/2021 2205   2  How many drinks containing alcohol do you have on a typical day you are drinking? 0 Filed at: 02/13/2021 2205   3b  FEMALE Any Age, or MALE 65+: How often do you have 4 or more drinks on one occassion? 0 Filed at: 02/13/2021 2205   Audit-C Score  0 Filed at: 02/13/2021 2205   BLADIMIR: How many times in the past year have you    Used an illegal drug or used a prescription medication for non-medical reasons? Never Filed at: 02/13/2021 2205                    MDM    Disposition  Final diagnoses: Foot pain, right   Gout   HTN (hypertension)     Time reflects when diagnosis was documented in both MDM as applicable and the Disposition within this note     Time User Action Codes Description Comment    2/13/2021 10:28 PM Deldamian Suarezops Add [A99 050] Foot pain, right     2/13/2021 10:29 PM Delora Erickops Add [M10 9] Gout     2/13/2021 10:29 PM Simon Marie Add [I10] HTN (hypertension)       ED Disposition     ED Disposition Condition Date/Time Comment    Discharge Stable Sat Feb 13, 2021 10:28 PM Diana Conner discharge to home/self care              Follow-up Information     Follow up With Specialties Details Hao Thompson, ROSALINDNP Nurse Practitioner Schedule an appointment as soon as possible for a visit in 3 days  [address] Axel Muñoz DPM Podiatry, [address] [facility]  Schedule an appointment as soon as possible for a visit in 3 days  [address]  [address] [facility]  [address]  [facility]            Patient's Medications   Discharge Prescriptions    No medications on file     No discharge procedures on file      PDMP Review     None          ED Provider  Electronically Signed by           Victor M Verduzco DO  02/13/21 6027

## 2021-02-22 DIAGNOSIS — Z12.11 SCREEN FOR COLON CANCER: Primary | ICD-10-CM

## 2021-02-24 ENCOUNTER — OFFICE VISIT (OUTPATIENT)
Dept: FAMILY MEDICINE CLINIC | Facility: CLINIC | Age: 66
End: 2021-02-24
Payer: COMMERCIAL

## 2021-02-24 ENCOUNTER — TRANSCRIBE ORDERS (OUTPATIENT)
Dept: ADMINISTRATIVE | Facility: HOSPITAL | Age: 66
End: 2021-02-24

## 2021-02-24 VITALS
DIASTOLIC BLOOD PRESSURE: 82 MMHG | SYSTOLIC BLOOD PRESSURE: 142 MMHG | WEIGHT: 206.4 LBS | OXYGEN SATURATION: 98 % | HEART RATE: 78 BPM | TEMPERATURE: 97.8 F | BODY MASS INDEX: 45.46 KG/M2

## 2021-02-24 DIAGNOSIS — M10.9 ACUTE GOUT OF RIGHT FOOT, UNSPECIFIED CAUSE: Primary | ICD-10-CM

## 2021-02-24 DIAGNOSIS — R22.32 ARM MASS, LEFT: ICD-10-CM

## 2021-02-24 DIAGNOSIS — R22.32 LUMP OF SKIN OF UPPER EXTREMITY, LEFT: Primary | ICD-10-CM

## 2021-02-24 DIAGNOSIS — H81.12 BENIGN PAROXYSMAL POSITIONAL VERTIGO OF LEFT EAR: ICD-10-CM

## 2021-02-24 PROCEDURE — 99214 OFFICE O/P EST MOD 30 MIN: CPT | Performed by: NURSE PRACTITIONER

## 2021-02-24 NOTE — PATIENT INSTRUCTIONS
Low Purine Diet   WHAT YOU NEED TO KNOW:   What is a low-purine diet? A low-purine diet is a meal plan based on foods that are low in purine content  Purine is a substance that is found in foods and is produced naturally by the body  Purines are broken down by the body and changed to uric acid  The kidneys normally filter the uric acid, and it leaves the body through the urine  However, people with gout sometimes have a buildup of uric acid in the blood  This buildup of uric acid can cause swelling and pain (a gout attack)  A low-purine diet may help to treat and prevent gout attacks  What foods can I include? The following foods are low in purine  · Eggs, nuts, and peanut butter    · Low-fat and fat free cheese and ice cream    · Skim or 1% milk    · Soup made without meat extract or broth    · Vegetables that are not on the medium-purine list below    · All fruit and fruit juices    · Bread, pasta, rice, cake, cornbread, and popcorn    · Water, soda, tea, coffee, and cocoa    · Sugar, sweets, and gelatin    · Fat and oil    What foods should I limit? · Medium-purine foods:      ? Meats:  Limit the following to 4 to 6 ounces each day  ? Meat and poultry     ? Crab, lobster, oysters, and shrimp    ? Vegetables:  Limit the following vegetables to ½ cup each day  ? Asparagus    ? Cauliflower    ? Spinach    ? Mushrooms    ? Green peas    ? Beans, peas, and lentils (limit to 1 cup each day)    ? Oats and oatmeal (limit to ? cup uncooked each day)    ? Wheat germ and bran (limit to ¼ cup each day)    · High-purine foods:  Limit or avoid foods high in purine  ? Anchovies, sardines, scallops, and mussels    ? Tuna, codfish, herring, and manju    ? [employer], like goose and duck    ? Organ meats, such as brains, heart, kidney, liver, and sweetbreads    ? Gravies and sauces made with meat    ? Yeast extracts taken in the form of a supplement    What other guidelines should I follow?    · Increase liquid intake  Drink 8 to 16 (eight-ounce) cups of liquid each day  At least half of the liquid you drink should be water  Liquid can help your body get rid of extra uric acid  · Limit or avoid alcohol  Alcohol (especially beer) increases your risk of a gout attack  Beer contains a high amount of purine  · Maintain a healthy weight  If you are overweight, you should lose weight slowly  Weight loss can help decrease the amount of stress on your joints  Regular exercise can help you lose weight if you are overweight, or maintain your weight if you are at a normal weight  Talk to your healthcare provider before you begin an exercise program     CARE AGREEMENT:   You have the right to help plan your care  Discuss treatment options with your healthcare provider to decide what care you want to receive  You always have the right to refuse treatment  The above information is an  only  It is not intended as medical advice for individual conditions or treatments  Talk to your doctor, nurse or pharmacist before following any medical regimen to see if it is safe and effective for you  © Copyright [address] Information is for End User's use only and may not be sold, redistributed or otherwise used for commercial purposes  All illustrations and images included in CareNotes® are the copyrighted property of [employer] A M [employer] Inc  or [facility] Uday Leo   Gout   WHAT YOU NEED TO KNOW:   What is gout? Gout is a form of arthritis that causes severe joint pain and stiffness  Acute gout pain starts suddenly, gets worse quickly, and stops on its own  Acute gout can become chronic and cause permanent damage to your joints  What causes gout? Gout develops when uric acid builds up in your joints  Uric acid is made when your body breaks down purines  Purines are found in some medicines and foods  Your body gets rid of most uric acid through your urine   When your body cannot get rid of enough uric acid, it can build up and form crystals in your joints  The crystals cause your joints to become swollen and painful  This is called a gout attack  What increases my risk for gout? You may have been born with a decreased ability to break down and get rid of purines  Your body's ability to break down purines may be very slow  Gout is more common in men than in women  Any of the following can also increase your risk:  · A family history of gout    · Kidney disease or problems with how your kidneys work     · Eating foods that are high in purines, such as red meat    · Alcohol or tobacco use    · Diuretic medicine (water pills), or aspirin    · A medical condition such as diabetes, high blood pressure, or high cholesterol    · A condition such as an irregular heartbeat or a blood clot in your lungs    What are the stages of gout? · Hyperuricemia  is a high level of uric acid  Hyperuricemia is not gout, but it increases your risk for gout  You may have no symptoms at this stage, and it usually does not need treatment  · Acute gouty arthritis  starts with a sudden attack of pain and swelling, usually in 1 joint  This may be in your big toe  The attack may last from a few days to 2 weeks  · Intercritical gout  is the time between attacks  You may go months or years without another attack  You will not have joint pain or stiffness, but this does not mean your gout is cured  You will still need treatment to prevent chronic gout  · Chronic tophaceous gout  develops if gout is not treated  Large amounts of uric acid crystals, called tophi, collect around your joints  The crystals can destroy or deform the joints  Gout attacks occur more often, and last hours to weeks  More than 1 joint may be painful and swollen  At this stage, gout symptoms do not go away on their own  How is gout diagnosed? Your healthcare provider will ask about your medicines, health problems, and allergies   Tell him or her when your joint pain and swelling started  He or she will need to know if the swelling and pain were worst within 1 day or if got worse over time  He or she will check the skin over your joints for redness  You may also need any of the following:  · Blood tests  are used to check the level of uric acid  You may need to have blood tested more than once  · A synovial fluid test  is used to collect a sample of fluid from around your painful joint  The fluid is sent to a lab to check for uric acid crystals  Synovial fluid surrounds and protects your joints  · An x-ray, ultrasound, CT, or MRI  may show the gout or damage to bones caused by gout  You may be given contrast liquid to help your joints show up better in the pictures  Tell the healthcare provider if you have ever had an allergic reaction to contrast liquid  Do not enter the MRI room with anything metal  Metal can cause serious injury  Tell the healthcare provider if you have any metal in or on your body  How is gout treated? The following can make your symptoms stop sooner, prevent attacks, and decrease your risk for joint damage:  · Medicines:      ? NSAIDs , such as ibuprofen, help decrease swelling, pain, and fever  This medicine is available with or without a doctor's order  NSAIDs can cause stomach bleeding or kidney problems in certain people  If you take blood thinner medicine, always ask your healthcare provider if NSAIDs are safe for you  Always read the medicine label and follow directions  ? Gout medicine  decreases joint pain and swelling  It may also be given to prevent new gout attacks  ? Steroids  reduce inflammation and can help your joint stiffness and pain during gout attacks  ? Uric acid medicine  may be given to reduce the amount of uric acid your body makes  Some medicines may help you pass more uric acid when you urinate  · Surgery  called a bone graft may be needed for tophi that are painful or infected   Bone in the joint may be replaced with bone taken from another place in your body  Ask your healthcare provider for more information about bone graft surgery  How can I manage my symptoms? · Rest your painful joint so it can heal   Your healthcare provider may recommend crutches or a walker if the affected joint is in a leg  · Apply ice to your joint  Ice decreases pain and swelling  Use an ice pack, or put crushed ice in a plastic bag  Cover the ice pack or bag with a towel before you apply it to your painful joint  Apply ice for 15 to 20 minutes every hour, or as directed  · Elevate your joint  Elevation helps reduce swelling and pain  Raise your joint above the level of your heart as often as you can  Prop your painful joint on pillows to keep it above your heart comfortably  · Go to physical therapy if directed  A physical therapist can teach you exercises to improve flexibility and range of motion  How can I help prevent gout attacks? · Do not eat high-purine foods  These foods include meats, seafood, asparagus, spinach, cauliflower, and some types of beans  Healthcare providers may tell you to eat more low-fat milk products, such as yogurt  Milk products may decrease your risk for gout attacks  Vitamin C and coffee may also help  Your healthcare provider or dietitian can help you create a meal plan  · Drink liquids as directed  Liquids such as water help remove uric acid from your body  Ask how much liquid to drink each day and which liquids are best for you  · Maintain a healthy weight  Weight loss may decrease the amount of uric acid in your body  Ask your healthcare provider how much you should weigh  Ask him to help you create a weight loss plan if you are overweight  · Control your blood sugar level if you have diabetes  Keep your blood sugar level in a normal range  This can help prevent gout attacks  · Limit or do not drink alcohol as directed  Alcohol can trigger a gout attack   Alcohol also increases your risk for dehydration  Ask your healthcare provider if alcohol is safe for you  When should I seek immediate care? · You have severe joint pain that you cannot tolerate  · You have a fever or redness that spreads beyond the joint area  When should I call my doctor? · You have a fever, chills, or body aches  · You are confused or more tired than usual      · You have new symptoms, such as a rash, after you start gout treatment  · Your joint pain and swelling do not go away, even after treatment  · You are not urinating as much or as often as you usually do  · You have trouble taking your gout medicines  CARE AGREEMENT:   You have the right to help plan your care  Learn about your health condition and how it may be treated  Discuss treatment options with your healthcare providers to decide what care you want to receive  You always have the right to refuse treatment  The above information is an  only  It is not intended as medical advice for individual conditions or treatments  Talk to your doctor, nurse or pharmacist before following any medical regimen to see if it is safe and effective for you  © Copyright [address] Information is for End User's use only and may not be sold, redistributed or otherwise used for commercial purposes   All illustrations and images included in CareNotes® are the copyrighted property of [employer]  or [address] [place] [address]

## 2021-02-24 NOTE — PROGRESS NOTES
Assessment/Plan:         Diagnoses and all orders for this visit:    Acute gout of right foot, unspecified cause  on steroid from podiatry has a boot      Reading material given   Arm mass, left  -     US MSK limited; Future    Benign paroxysmal positional vertigo of left ear  Nothing currently    Return if it returns                 Subjective:      Patient ID: Malgorzata Morillo is a 60 y o  female  HPI  Went to ER on 2/13 and gout on the right foot--ball of foot and dorsal foot and toes and ankle     Uric acid was elevated   Told to take ibu and tylenol    Not sent home with anything  Was getting better but back    Saw podiatry yesterday and told + for gout and given steroid  This all started about beginning of Feb    Never had before  Also states once or twice she had vertigo when she laid on her left side    Passed when she laid on her right    Came and gone   Nothing recently  Discussed postional vertigo      Lump that hurts off an on upper left arm   Also one in left forearm    Will hurt from time to time but doesn't have to be doing anything       The following portions of the patient's history were reviewed and updated as appropriate: allergies, current medications, past family history, past medical history, past social history, past surgical history and problem list     Review of Systems   Constitutional: Positive for activity change  Negative for appetite change  Musculoskeletal: Positive for joint swelling  Neurological: Negative for weakness           Objective:  Vitals:    02/24/21 1027   BP: 142/82   BP Location: Left arm   Patient Position: Sitting   Cuff Size: Large   Pulse: 78   Temp: 97 8 °F (36 6 °C)   TempSrc: Temporal   SpO2: 98%   Weight: 93 6 kg (206 lb 6 4 oz)      Physical Exam  Musculoskeletal:        Feet:    Skin:

## 2021-03-08 ENCOUNTER — HOSPITAL ENCOUNTER (OUTPATIENT)
Dept: ULTRASOUND IMAGING | Facility: HOSPITAL | Age: 66
Discharge: HOME | End: 2021-03-08
Payer: COMMERCIAL

## 2021-03-08 DIAGNOSIS — R22.32 LUMP OF SKIN OF UPPER EXTREMITY, LEFT: ICD-10-CM

## 2021-03-08 PROCEDURE — 76882 US LMTD JT/FCL EVL NVASC XTR: CPT

## 2021-03-22 ENCOUNTER — TELEPHONE (OUTPATIENT)
Dept: FAMILY MEDICINE CLINIC | Facility: CLINIC | Age: 66
End: 2021-03-22

## 2021-03-22 NOTE — TELEPHONE ENCOUNTER
Patient called back after missed call  I read note about ultrasound and she said she did not want to pursue more testing at this time

## 2021-03-30 ENCOUNTER — IMMUNIZATIONS (OUTPATIENT)
Dept: FAMILY MEDICINE CLINIC | Facility: HOSPITAL | Age: 66
End: 2021-03-30

## 2021-03-30 DIAGNOSIS — Z23 ENCOUNTER FOR IMMUNIZATION: Primary | ICD-10-CM

## 2021-03-30 PROCEDURE — 91301 SARS-COV-2 / COVID-19 MRNA VACCINE (MODERNA) 100 MCG: CPT

## 2021-03-30 PROCEDURE — 0011A SARS-COV-2 / COVID-19 MRNA VACCINE (MODERNA) 100 MCG: CPT

## 2021-04-26 ENCOUNTER — IMMUNIZATIONS (OUTPATIENT)
Dept: FAMILY MEDICINE CLINIC | Facility: HOSPITAL | Age: 66
End: 2021-04-26

## 2021-04-26 DIAGNOSIS — Z23 ENCOUNTER FOR IMMUNIZATION: Primary | ICD-10-CM

## 2021-04-26 PROCEDURE — 91301 SARS-COV-2 / COVID-19 MRNA VACCINE (MODERNA) 100 MCG: CPT

## 2021-04-26 PROCEDURE — 0012A SARS-COV-2 / COVID-19 MRNA VACCINE (MODERNA) 100 MCG: CPT

## 2021-05-17 DIAGNOSIS — R32 URINARY INCONTINENCE, UNSPECIFIED TYPE: ICD-10-CM

## 2021-05-17 RX ORDER — OXYBUTYNIN CHLORIDE 5 MG/1
5 TABLET ORAL 2 TIMES DAILY
Qty: 60 TABLET | Refills: 3 | Status: SHIPPED | OUTPATIENT
Start: 2021-05-17 | End: 2021-05-17

## 2021-05-17 RX ORDER — OXYBUTYNIN CHLORIDE 5 MG/1
TABLET ORAL
Qty: 180 TABLET | Refills: 1 | Status: SHIPPED | OUTPATIENT
Start: 2021-05-17

## 2021-06-11 ENCOUNTER — TELEPHONE (OUTPATIENT)
Dept: FAMILY MEDICINE CLINIC | Facility: CLINIC | Age: 66
End: 2021-06-11

## 2021-06-11 ENCOUNTER — HOSPITAL ENCOUNTER (EMERGENCY)
Facility: HOSPITAL | Age: 66
Discharge: HOME | End: 2021-06-11
Attending: EMERGENCY MEDICINE | Admitting: EMERGENCY MEDICINE
Payer: COMMERCIAL

## 2021-06-11 VITALS
OXYGEN SATURATION: 96 % | BODY MASS INDEX: 44.67 KG/M2 | HEART RATE: 97 BPM | SYSTOLIC BLOOD PRESSURE: 204 MMHG | RESPIRATION RATE: 20 BRPM | WEIGHT: 202.82 LBS | TEMPERATURE: 100.7 F | DIASTOLIC BLOOD PRESSURE: 101 MMHG

## 2021-06-11 DIAGNOSIS — L03.313 CELLULITIS OF CHEST WALL: Primary | ICD-10-CM

## 2021-06-11 PROCEDURE — 99284 EMERGENCY DEPT VISIT MOD MDM: CPT | Performed by: EMERGENCY MEDICINE

## 2021-06-11 PROCEDURE — 99282 EMERGENCY DEPT VISIT SF MDM: CPT

## 2021-06-11 RX ORDER — SULFAMETHOXAZOLE AND TRIMETHOPRIM 800; 160 MG/1; MG/1
1 TABLET ORAL ONCE
Status: COMPLETED | OUTPATIENT
Start: 2021-06-11 | End: 2021-06-11

## 2021-06-11 RX ORDER — SULFAMETHOXAZOLE AND TRIMETHOPRIM 800; 160 MG/1; MG/1
1 TABLET ORAL 2 TIMES DAILY
Qty: 14 TABLET | Refills: 0 | Status: SHIPPED | OUTPATIENT
Start: 2021-06-11 | End: 2021-06-17 | Stop reason: SDUPTHER

## 2021-06-11 RX ORDER — ACETAMINOPHEN 325 MG/1
975 TABLET ORAL ONCE
Status: COMPLETED | OUTPATIENT
Start: 2021-06-11 | End: 2021-06-11

## 2021-06-11 RX ADMIN — ACETAMINOPHEN 975 MG: 325 TABLET ORAL at 16:23

## 2021-06-11 RX ADMIN — SULFAMETHOXAZOLE AND TRIMETHOPRIM 1 TABLET: 800; 160 TABLET ORAL at 16:23

## 2021-06-11 NOTE — ED PROVIDER NOTES
History  Chief Complaint   Patient presents with    Rash     under left breast     Patient is a 56-year-old female who presents with a 2 day history of redness swelling to the underneath of her left breast   Constant throbbing pain  Called her doctor and was referred to urgent care and is here now for evaluation  Denies any fevers sweats or chills  Patient is however febrile here in triage vitals  Patient does have a allergy to penicillin  Prior to Admission Medications   Prescriptions Last Dose Informant Patient Reported? Taking? albuterol (PROVENTIL HFA,VENTOLIN HFA) 90 mcg/act inhaler  Self No No   Sig: Inhale 1 puff every 6 (six) hours   aspirin (ECOTRIN) 325 mg EC tablet  Self No No   Sig: Take 1 tablet (325 mg total) by mouth daily   clindamycin (CLEOCIN T) 1 % lotion  Self No No   Sig: Apply topically 2 (two) times a day   metroNIDAZOLE (METROGEL) 0 75 % gel  Self No No   Sig: Apply topically 2 (two) times a day   Patient not taking: Reported on 2/24/2021   oxybutynin (DITROPAN) 5 mg tablet   No No   Sig: TAKE 1 TABLET(5 MG) BY MOUTH TWICE DAILY   pantoprazole (PROTONIX) 40 mg tablet  Self No No   Sig: Take 1 tablet (40 mg total) by mouth daily      Facility-Administered Medications: None       Past Medical History:   Diagnosis Date    Asthma     Gout 02/13/2021    Lateral epicondylitis 10/9/2015       Past Surgical History:   Procedure Laterality Date    IMPLANTATION VAGAL NERVE STIMULATOR         Family History   Problem Relation Age of Onset    No Known Problems Mother     No Known Problems Sister     No Known Problems Maternal Grandmother     No Known Problems Paternal Grandmother     Cancer Paternal Uncle      I have reviewed and agree with the history as documented      E-Cigarette/Vaping    E-Cigarette Use Never User      E-Cigarette/Vaping Substances    Nicotine No     THC No     CBD No     Flavoring No     Other No     Unknown No      Social History     Tobacco Use    Smoking status: Former Smoker     Packs/day: 1 00     Types: Cigarettes     Quit date:      Years since quittin 4    Smokeless tobacco: Former User    Tobacco comment: exposure to passive smoke   Substance Use Topics    Alcohol use: No    Drug use: No       Review of Systems   Constitutional: Negative  HENT: Negative  Eyes: Negative  Respiratory: Negative  Cardiovascular: Negative  Gastrointestinal: Negative  Endocrine: Negative  Genitourinary: Negative  Musculoskeletal: Negative  Skin: Positive for wound  Allergic/Immunologic: Negative  Neurological: Negative  Hematological: Negative  Psychiatric/Behavioral: Negative  All other systems reviewed and are negative  Physical Exam  Physical Exam  Vitals signs and nursing note reviewed  Constitutional:       Appearance: Normal appearance  She is obese  HENT:      Head: Normocephalic and atraumatic  Neck:      Musculoskeletal: Normal range of motion and neck supple  Cardiovascular:      Rate and Rhythm: Normal rate and regular rhythm  Pulses: Normal pulses  Heart sounds: Normal heart sounds  Pulmonary:      Effort: Pulmonary effort is normal       Breath sounds: Normal breath sounds  Abdominal:      General: Bowel sounds are normal       Palpations: Abdomen is soft  Musculoskeletal: Normal range of motion  Skin:     General: Skin is warm  Capillary Refill: Capillary refill takes less than 2 seconds  Comments: Patient with small abscess that is unroofed and open on the underside the left breast with redness extending to underneath the nipple on the left  Neurological:      General: No focal deficit present  Mental Status: She is alert and oriented to person, place, and time     Psychiatric:         Behavior: Behavior normal          Vital Signs  ED Triage Vitals [21 1612]   Temperature Pulse Respirations Blood Pressure SpO2   (!) 100 7 °F (38 2 °C) 97 20 (!) 204/101 96 %      Temp Source Heart Rate Source Patient Position - Orthostatic VS BP Location FiO2 (%)   Tympanic Monitor Sitting Left arm --      Pain Score       --           Vitals:    06/11/21 1612   BP: (!) 204/101   Pulse: 97   Patient Position - Orthostatic VS: Sitting         Visual Acuity      ED Medications  Medications   acetaminophen (TYLENOL) tablet 975 mg (975 mg Oral Given 6/11/21 1623)   sulfamethoxazole-trimethoprim (BACTRIM DS) 800-160 mg per tablet 1 tablet (1 tablet Oral Given 6/11/21 1623)       Diagnostic Studies  Results Reviewed     None                 No orders to display              Procedures  Procedures         ED Course                                           MDM    Disposition  Final diagnoses:   Cellulitis of chest wall     Time reflects when diagnosis was documented in both MDM as applicable and the Disposition within this note     Time User Action Codes Description Comment    6/11/2021  4:08 PM Kelly Marc Add [Z41 [id number]] Cellulitis of chest wall       ED Disposition     ED Disposition Condition Date/Time Comment    Discharge Stable Fri Jun 11, 2021  4:07 PM Diana Conner discharge to home/self care              Follow-up Information     Follow up With Specialties Details [address], [facility] Nurse Practitioner   [address]  [phone]            Discharge Medication List as of 6/11/2021  4:08 PM      START taking these medications    Details   sulfamethoxazole-trimethoprim (BACTRIM DS) 800-160 mg per tablet Take 1 tablet by mouth 2 (two) times a day for 7 days smx-tmp DS (BACTRIM) 800-160 mg tabs (1tab q12 D10), Starting Fri 6/11/2021, Until Fri 6/18/2021, Normal         CONTINUE these medications which have NOT CHANGED    Details   albuterol (PROVENTIL HFA,VENTOLIN HFA) 90 mcg/act inhaler Inhale 1 puff every 6 (six) hours, Starting Thu 2/11/2021, Normal      aspirin (ECOTRIN) 325 mg EC tablet Take 1 tablet (325 mg total) by mouth daily, Starting Fri 10/16/2020, Normal      clindamycin (CLEOCIN T) 1 % lotion Apply topically 2 (two) times a day, Starting Tue 7/28/2020, Normal      metroNIDAZOLE (METROGEL) 0 75 % gel Apply topically 2 (two) times a day, Starting Tue 7/28/2020, Normal      oxybutynin (DITROPAN) 5 mg tablet TAKE 1 TABLET(5 MG) BY MOUTH TWICE DAILY, Normal      pantoprazole (PROTONIX) 40 mg tablet Take 1 tablet (40 mg total) by mouth daily, Starting Tue 7/28/2020, Normal           No discharge procedures on file      PDMP Review     None          ED Provider  Electronically Signed by           Paramjit Harding MD  06/11/21 0750

## 2021-06-11 NOTE — TELEPHONE ENCOUNTER
Patient called RE: left breast rash started 2 days ago, area is red and there is blister or boil present  Denies any new soap or lotion, did not try any OTC   Given advised to go to urgent care per [facility]
[phone]

## 2021-06-11 NOTE — TELEPHONE ENCOUNTER
Patient called back about the lump on her breast  States she called earlier and was waiting to hear back from you  Carl Gallardo took the call earlier and was waiting for you to come out of the room  ? She  said she was waiting for you to call her back  She states the lump has now burst and she thinks its infected  I advised her to go to urgent care or ER, but she is waiting for a call back from you

## 2021-06-16 ENCOUNTER — TELEPHONE (OUTPATIENT)
Dept: FAMILY MEDICINE CLINIC | Facility: CLINIC | Age: 66
End: 2021-06-16

## 2021-06-16 NOTE — TELEPHONE ENCOUNTER
patient called about the drainage and the bleeding and is now seeing pus on the area where she has cellulitis  Batsheva Handley She wonders what she should do  Should she use neosporin?   I scheduled her with you next Tuesday AM

## 2021-06-17 ENCOUNTER — OFFICE VISIT (OUTPATIENT)
Dept: FAMILY MEDICINE CLINIC | Facility: CLINIC | Age: 66
End: 2021-06-17
Payer: COMMERCIAL

## 2021-06-17 VITALS
BODY MASS INDEX: 45.08 KG/M2 | OXYGEN SATURATION: 93 % | DIASTOLIC BLOOD PRESSURE: 76 MMHG | HEIGHT: 56 IN | WEIGHT: 200.4 LBS | SYSTOLIC BLOOD PRESSURE: 110 MMHG | HEART RATE: 93 BPM | TEMPERATURE: 97.6 F | RESPIRATION RATE: 17 BRPM

## 2021-06-17 DIAGNOSIS — L03.313 CELLULITIS OF CHEST WALL: ICD-10-CM

## 2021-06-17 PROCEDURE — 99213 OFFICE O/P EST LOW 20 MIN: CPT | Performed by: NURSE PRACTITIONER

## 2021-06-17 RX ORDER — SULFAMETHOXAZOLE AND TRIMETHOPRIM 800; 160 MG/1; MG/1
1 TABLET ORAL 2 TIMES DAILY
Qty: 14 TABLET | Refills: 0 | Status: SHIPPED | OUTPATIENT
Start: 2021-06-17 | End: 2021-06-24

## 2021-06-17 NOTE — PROGRESS NOTES
Assessment/Plan:         Diagnoses and all orders for this visit:    Cellulitis of chest wall  -     sulfamethoxazole-trimethoprim (BACTRIM DS) 800-160 mg per tablet; Take 1 tablet by mouth 2 (two) times a day for 7 days smx-tmp DS (BACTRIM) 800-160 mg tabs (1tab q12 D10)      will increase warm compresses    Extended abx    See me next week   Will order mammogram then   Also will do PE     Subjective:      Patient ID: Queta Matos is a 60 y o  female  HPI  Seen in ER on 6/11 for cellulitis under left breast    ON bactrim   Was draining yesterday   Now stopped   Went to ER due to fevers and smelly drainage  Due for mammogram         The following portions of the patient's history were reviewed and updated as appropriate: allergies, current medications, past family history, past medical history, past social history, past surgical history and problem list     Review of Systems   Constitutional: Negative for activity change, appetite change, chills, fatigue and fever  HENT: Negative for congestion and sore throat  Genitourinary: Negative for frequency  Skin: Positive for wound  Neurological: Negative for dizziness, light-headedness and headaches  Objective:  Vitals:    06/17/21 0656   BP: 110/76   BP Location: Left arm   Patient Position: Sitting   Cuff Size: Adult   Pulse: 93   Resp: 17   Temp: 97 6 °F (36 4 °C)   TempSrc: Tympanic   SpO2: 93%   Weight: 90 9 kg (200 lb 6 4 oz)   Height: 4' 8" (1 422 m)      Physical Exam  Vitals reviewed  Constitutional:       Appearance: Normal appearance  She is obese  Chest:       Neurological:      Mental Status: She is alert and oriented to person, place, and time  Psychiatric:         Mood and Affect: Mood normal          Behavior: Behavior normal          Thought Content: Thought content normal          Judgment: Judgment normal        BMI Counseling: Body mass index is 44 93 kg/m²   The BMI is above normal  Nutrition recommendations include decreasing portion sizes, encouraging healthy choices of fruits and vegetables, decreasing fast food intake, consuming healthier snacks, limiting drinks that contain sugar and moderation in carbohydrate intake  Exercise recommendations include exercising 3-5 times per week  No pharmacotherapy was ordered

## 2021-06-22 ENCOUNTER — OFFICE VISIT (OUTPATIENT)
Dept: FAMILY MEDICINE CLINIC | Facility: CLINIC | Age: 66
End: 2021-06-22
Payer: COMMERCIAL

## 2021-06-22 VITALS
DIASTOLIC BLOOD PRESSURE: 76 MMHG | WEIGHT: 200.2 LBS | BODY MASS INDEX: 45.04 KG/M2 | OXYGEN SATURATION: 97 % | HEART RATE: 62 BPM | TEMPERATURE: 97 F | SYSTOLIC BLOOD PRESSURE: 128 MMHG | RESPIRATION RATE: 18 BRPM | HEIGHT: 56 IN

## 2021-06-22 DIAGNOSIS — E78.5 HYPERLIPIDEMIA, UNSPECIFIED HYPERLIPIDEMIA TYPE: ICD-10-CM

## 2021-06-22 DIAGNOSIS — N63.20 LEFT BREAST MASS: ICD-10-CM

## 2021-06-22 DIAGNOSIS — Z00.00 ANNUAL PHYSICAL EXAM: ICD-10-CM

## 2021-06-22 DIAGNOSIS — Z12.11 SCREEN FOR COLON CANCER: Primary | ICD-10-CM

## 2021-06-22 LAB
ALBUMIN SERPL BCP-MCNC: 3.7 G/DL (ref 3.5–5)
ALP SERPL-CCNC: 71 U/L (ref 46–116)
ALT SERPL W P-5'-P-CCNC: 49 U/L (ref 12–78)
ANION GAP SERPL CALCULATED.3IONS-SCNC: 9 MMOL/L (ref 4–13)
AST SERPL W P-5'-P-CCNC: 50 U/L (ref 5–45)
BASOPHILS # BLD AUTO: 0.04 THOUSANDS/ΜL (ref 0–0.1)
BASOPHILS NFR BLD AUTO: 1 % (ref 0–1)
BILIRUB SERPL-MCNC: 0.4 MG/DL (ref 0.2–1)
BUN SERPL-MCNC: 17 MG/DL (ref 5–25)
CALCIUM SERPL-MCNC: 9.4 MG/DL (ref 8.3–10.1)
CHLORIDE SERPL-SCNC: 107 MMOL/L (ref 100–108)
CHOLEST SERPL-MCNC: 207 MG/DL (ref 50–200)
CO2 SERPL-SCNC: 23 MMOL/L (ref 21–32)
CREAT SERPL-MCNC: 1.08 MG/DL (ref 0.6–1.3)
EOSINOPHIL # BLD AUTO: 0.21 THOUSAND/ΜL (ref 0–0.61)
EOSINOPHIL NFR BLD AUTO: 4 % (ref 0–6)
ERYTHROCYTE [DISTWIDTH] IN BLOOD BY AUTOMATED COUNT: 14.1 % (ref 11.6–15.1)
GFR SERPL CREATININE-BSD FRML MDRD: 54 ML/MIN/1.73SQ M
GLUCOSE P FAST SERPL-MCNC: 78 MG/DL (ref 65–99)
HCT VFR BLD AUTO: 44.1 % (ref 34.8–46.1)
HDLC SERPL-MCNC: 52 MG/DL
HGB BLD-MCNC: 14.5 G/DL (ref 11.5–15.4)
IMM GRANULOCYTES # BLD AUTO: 0.04 THOUSAND/UL (ref 0–0.2)
IMM GRANULOCYTES NFR BLD AUTO: 1 % (ref 0–2)
LDLC SERPL CALC-MCNC: 112 MG/DL (ref 0–100)
LYMPHOCYTES # BLD AUTO: 1.95 THOUSANDS/ΜL (ref 0.6–4.47)
LYMPHOCYTES NFR BLD AUTO: 32 % (ref 14–44)
MCH RBC QN AUTO: 30.9 PG (ref 26.8–34.3)
MCHC RBC AUTO-ENTMCNC: 32.9 G/DL (ref 31.4–37.4)
MCV RBC AUTO: 94 FL (ref 82–98)
MONOCYTES # BLD AUTO: 0.33 THOUSAND/ΜL (ref 0.17–1.22)
MONOCYTES NFR BLD AUTO: 6 % (ref 4–12)
NEUTROPHILS # BLD AUTO: 3.48 THOUSANDS/ΜL (ref 1.85–7.62)
NEUTS SEG NFR BLD AUTO: 56 % (ref 43–75)
NRBC BLD AUTO-RTO: 0 /100 WBCS
PLATELET # BLD AUTO: 203 THOUSANDS/UL (ref 149–390)
PMV BLD AUTO: 11.8 FL (ref 8.9–12.7)
POTASSIUM SERPL-SCNC: 4.3 MMOL/L (ref 3.5–5.3)
PROT SERPL-MCNC: 8.3 G/DL (ref 6.4–8.2)
RBC # BLD AUTO: 4.7 MILLION/UL (ref 3.81–5.12)
SODIUM SERPL-SCNC: 139 MMOL/L (ref 136–145)
TRIGL SERPL-MCNC: 216 MG/DL
TSH SERPL DL<=0.05 MIU/L-ACNC: 1.83 UIU/ML (ref 0.36–3.74)
WBC # BLD AUTO: 6.05 THOUSAND/UL (ref 4.31–10.16)

## 2021-06-22 PROCEDURE — 84443 ASSAY THYROID STIM HORMONE: CPT | Performed by: NURSE PRACTITIONER

## 2021-06-22 PROCEDURE — 99396 PREV VISIT EST AGE 40-64: CPT | Performed by: NURSE PRACTITIONER

## 2021-06-22 PROCEDURE — 85025 COMPLETE CBC W/AUTO DIFF WBC: CPT | Performed by: NURSE PRACTITIONER

## 2021-06-22 PROCEDURE — 36415 COLL VENOUS BLD VENIPUNCTURE: CPT | Performed by: NURSE PRACTITIONER

## 2021-06-22 PROCEDURE — 80053 COMPREHEN METABOLIC PANEL: CPT | Performed by: NURSE PRACTITIONER

## 2021-06-22 PROCEDURE — 80061 LIPID PANEL: CPT | Performed by: NURSE PRACTITIONER

## 2021-06-22 NOTE — PROGRESS NOTES
ADULT ANNUAL PHYSICAL  [address] [employer] [facility]    NAME: Vishal Dueñas  AGE: 60 y o  SEX: female  : 1955     DATE: 2021     Assessment and Plan:     Problem List Items Addressed This Visit        Other    Hyperlipidemia    Relevant Orders    Lipid Panel with Direct LDL reflex    TSH, 3rd generation with Free T4 reflex      Other Visit Diagnoses     Screen for colon cancer    -  Primary    Relevant Orders    Ambulatory referral to Gastroenterology    Annual physical exam        Relevant Orders    CBC and differential    Comprehensive metabolic panel    TSH, 3rd generation with Free T4 reflex    Left breast mass        Relevant Orders    Mammo diagnostic bilateral w cad          Immunizations and preventive care screenings were discussed with patient today  Appropriate education was printed on patient's after visit summary  Counseling:  Alcohol/drug use: discussed moderation in alcohol intake, the recommendations for healthy alcohol use, and avoidance of illicit drug use  Dental Health: discussed importance of regular tooth brushing, flossing, and dental visits  Sexual health: discussed sexually transmitted diseases, partner selection, use of condoms, avoidance of unintended pregnancy, and contraceptive alternatives  · Exercise: the importance of regular exercise/physical activity was discussed  Recommend exercise 3-5 times per week for at least 30 minutes  Return in about 6 months (around 2021) for Next scheduled follow up  Chief Complaint:     Chief Complaint   Patient presents with    Physical Exam    Follow-up     breast      History of Present Illness:     Adult Annual Physical   Patient here for a comprehensive physical exam  The patient reports problems - here for pE and follow up on left breast infection    Was on bactrim       Diet and Physical Activity  · Diet/Nutrition: limited junk food, low carb diet and limited fruits/vegetables  · Exercise: no formal exercise  Depression Screening  PHQ-9 Depression Screening    PHQ-9:   Frequency of the following problems over the past two weeks:           General Health  · Sleep: sleeps well  · Hearing: normal - bilateral   · Vision: most recent eye exam <1 year ago and wears glasses  · Dental: no teeth       /GYN Health  · Patient is: postmenopausal  ·   ·   Review of Systems:     Review of Systems   Constitutional: Negative for activity change, appetite change, chills and fever  HENT: Negative for congestion, ear pain, rhinorrhea, sinus pain and sore throat  Eyes: Negative for pain and visual disturbance  Respiratory: Negative for cough, chest tightness and shortness of breath  Cardiovascular: Negative for chest pain and palpitations  Gastrointestinal: Negative for abdominal pain and vomiting  Genitourinary: Negative for dysuria, hematuria and urgency  Musculoskeletal: Negative for arthralgias and back pain  Skin: Negative for color change and rash  Neurological: Negative for dizziness, seizures, syncope, light-headedness and headaches  Psychiatric/Behavioral: The patient is not nervous/anxious  All other systems reviewed and are negative       Past Medical History:     Past Medical History:   Diagnosis Date    Asthma     Gout 2021    Lateral epicondylitis 10/9/2015      Past Surgical History:     Past Surgical History:   Procedure Laterality Date    IMPLANTATION VAGAL NERVE STIMULATOR        Social History:     Social History     Socioeconomic History    Marital status: Single     Spouse name: None    Number of children: None    Years of education: None    Highest education level: None   Occupational History    None   Tobacco Use    Smoking status: Former Smoker     Packs/day: 1 00     Types: Cigarettes     Quit date:      Years since quittin 4    Smokeless tobacco: Former User    Tobacco comment: exposure to passive smoke   Vaping Use    Vaping Use: Never used   Substance and Sexual Activity    Alcohol use: No    Drug use: No    Sexual activity: None   Other Topics Concern    None   Social History Narrative    None     Social Determinants of Health     Financial Resource Strain:     Difficulty of Paying Living Expenses:    Food Insecurity:     Worried About Running Out of Food in the Last Year:     [address] in the Last Year:    Transportation Needs:     Lack of Transportation (Medical):      Lack of Transportation (Non-Medical):    Physical Activity:     Days of Exercise per Week:     Minutes of Exercise per Session:    Stress:     Feeling of Stress :    Social Connections:     Frequency of Communication with Friends and Family:     Frequency of Social Gatherings with Friends and Family:     Attends Yarsanism Services:     Active Member of Clubs or Organizations:     Attends Club or Organization Meetings:     Marital Status:    Intimate Partner Violence:     Fear of Current or Ex-Partner:     Emotionally Abused:     Physically Abused:     Sexually Abused:       Family History:     Family History   Problem Relation Age of Onset    No Known Problems Mother     No Known Problems Sister     No Known Problems Maternal Grandmother     No Known Problems Paternal Grandmother     Cancer Paternal Uncle       Current Medications:     Current Outpatient Medications   Medication Sig Dispense Refill    albuterol (PROVENTIL HFA,VENTOLIN HFA) 90 mcg/act inhaler Inhale 1 puff every 6 (six) hours 1 Inhaler 3    aspirin (ECOTRIN) 325 mg EC tablet Take 1 tablet (325 mg total) by mouth daily 30 tablet 2    clindamycin (CLEOCIN T) 1 % lotion Apply topically 2 (two) times a day 60 mL 1    oxybutynin (DITROPAN) 5 mg tablet TAKE 1 TABLET(5 MG) BY MOUTH TWICE DAILY 180 tablet 1    pantoprazole (PROTONIX) 40 mg tablet Take 1 tablet (40 mg total) by mouth daily 90 tablet 3    sulfamethoxazole-trimethoprim (BACTRIM DS) 800-160 mg per tablet Take 1 tablet by mouth 2 (two) times a day for 7 days smx-tmp DS (BACTRIM) 800-160 mg tabs (1tab q12 D10) 14 tablet 0     No current facility-administered medications for this visit  Allergies: Allergies   Allergen Reactions    Lansoprazole Palpitations     Erythema/ tachycardia  Other reaction(s): tachycardia    Penicillins Itching and Rash     Other reaction(s): Rash  Other reaction(s): Rash      Physical Exam:     /76 (BP Location: Left arm, Patient Position: Sitting, Cuff Size: Standard)   Pulse 62   Temp (!) 97 °F (36 1 °C) (Temporal)   Resp 18   Ht 4' 7 5" (1 41 m)   Wt 90 8 kg (200 lb 3 2 oz)   SpO2 97%   BMI 45 70 kg/m²     Physical Exam  Vitals and nursing note reviewed  Constitutional:       General: She is not in acute distress  Appearance: Normal appearance  She is well-developed  She is obese  HENT:      Head: Normocephalic and atraumatic  Right Ear: Tympanic membrane, ear canal and external ear normal       Left Ear: Tympanic membrane, ear canal and external ear normal    Eyes:      Conjunctiva/sclera: Conjunctivae normal       Pupils: Pupils are equal, round, and reactive to light  Cardiovascular:      Rate and Rhythm: Normal rate and regular rhythm  Pulses: Normal pulses  Heart sounds: Normal heart sounds  No murmur heard  Pulmonary:      Effort: Pulmonary effort is normal  No respiratory distress  Breath sounds: Normal breath sounds  Chest:       Abdominal:      General: Bowel sounds are normal       Palpations: Abdomen is soft  Tenderness: There is no abdominal tenderness  Musculoskeletal:         General: Normal range of motion  Cervical back: Normal range of motion and neck supple  Skin:     General: Skin is warm and dry  Neurological:      Mental Status: She is alert and oriented to person, place, and time     Psychiatric:         Mood and Affect: Mood normal          Behavior: Behavior normal  Thought Content:  Thought content normal          Judgment: Judgment normal           Macey BRIE Gallegos  [address]

## 2021-06-22 NOTE — PATIENT INSTRUCTIONS

## 2021-06-23 ENCOUNTER — TELEPHONE (OUTPATIENT)
Dept: ADMINISTRATIVE | Facility: OTHER | Age: 66
End: 2021-06-23

## 2021-06-23 NOTE — TELEPHONE ENCOUNTER
Upon review of the In Basket request we noted that [facility] has already been updated with the date given  Any additional questions or concerns should be emailed to the Practice Liaisons via [email or web address] email, please do not reply via In Basket      Thank you  Devorah Paez

## 2021-07-15 ENCOUNTER — HOSPITAL ENCOUNTER (OUTPATIENT)
Dept: MAMMOGRAPHY | Facility: CLINIC | Age: 66
Discharge: HOME | End: 2021-07-15
Payer: COMMERCIAL

## 2021-07-15 VITALS — BODY MASS INDEX: 44.99 KG/M2 | WEIGHT: 200 LBS | HEIGHT: 56 IN

## 2021-07-15 DIAGNOSIS — N63.20 LEFT BREAST MASS: ICD-10-CM

## 2021-07-15 DIAGNOSIS — N64.4 BREAST PAIN, LEFT: ICD-10-CM

## 2021-07-15 PROCEDURE — 77066 DX MAMMO INCL CAD BI: CPT

## 2021-07-15 PROCEDURE — 76642 ULTRASOUND BREAST LIMITED: CPT

## 2021-07-15 PROCEDURE — G0279 TOMOSYNTHESIS, MAMMO: HCPCS

## 2021-08-12 DIAGNOSIS — K21.9 GASTROESOPHAGEAL REFLUX DISEASE WITHOUT ESOPHAGITIS: ICD-10-CM

## 2021-08-12 RX ORDER — PANTOPRAZOLE SODIUM 40 MG/1
TABLET, DELAYED RELEASE ORAL
Qty: 90 TABLET | Refills: 3 | Status: SHIPPED | OUTPATIENT
Start: 2021-08-12

## 2022-01-13 ENCOUNTER — OFFICE VISIT (OUTPATIENT)
Dept: FAMILY MEDICINE CLINIC | Facility: CLINIC | Age: 67
End: 2022-01-13
Payer: COMMERCIAL

## 2022-01-13 VITALS
OXYGEN SATURATION: 97 % | HEART RATE: 63 BPM | RESPIRATION RATE: 18 BRPM | SYSTOLIC BLOOD PRESSURE: 164 MMHG | TEMPERATURE: 97.2 F | HEIGHT: 56 IN | DIASTOLIC BLOOD PRESSURE: 90 MMHG | BODY MASS INDEX: 44.36 KG/M2 | WEIGHT: 197.2 LBS

## 2022-01-13 DIAGNOSIS — N18.30 STAGE 3 CHRONIC KIDNEY DISEASE, UNSPECIFIED WHETHER STAGE 3A OR 3B CKD (HCC): ICD-10-CM

## 2022-01-13 DIAGNOSIS — Z23 NEED FOR VACCINATION: ICD-10-CM

## 2022-01-13 DIAGNOSIS — E66.01 MORBID OBESITY WITH BMI OF 45.0-49.9, ADULT (HCC): ICD-10-CM

## 2022-01-13 DIAGNOSIS — Z00.00 MEDICARE ANNUAL WELLNESS VISIT, SUBSEQUENT: Primary | ICD-10-CM

## 2022-01-13 DIAGNOSIS — E78.5 DYSLIPIDEMIA: ICD-10-CM

## 2022-01-13 DIAGNOSIS — Z53.20 PROCEDURE REFUSED: ICD-10-CM

## 2022-01-13 PROCEDURE — 90662 IIV NO PRSV INCREASED AG IM: CPT

## 2022-01-13 PROCEDURE — 99214 OFFICE O/P EST MOD 30 MIN: CPT | Performed by: NURSE PRACTITIONER

## 2022-01-13 PROCEDURE — G0008 ADMIN INFLUENZA VIRUS VAC: HCPCS

## 2022-01-13 PROCEDURE — 1123F ACP DISCUSS/DSCN MKR DOCD: CPT

## 2022-01-13 PROCEDURE — G0439 PPPS, SUBSEQ VISIT: HCPCS | Performed by: NURSE PRACTITIONER

## 2022-01-13 PROCEDURE — G0009 ADMIN PNEUMOCOCCAL VACCINE: HCPCS

## 2022-01-13 PROCEDURE — 90670 PCV13 VACCINE IM: CPT

## 2022-01-13 NOTE — PROGRESS NOTES
Assessment and Plan:     Problem List Items Addressed This Visit     None      Visit Diagnoses     Medicare annual wellness visit, subsequent    -  Primary    Need for vaccination               Preventive health issues were discussed with patient, and age appropriate screening tests were ordered as noted in patient's After Visit Summary  Personalized health advice and appropriate referrals for health education or preventive services given if needed, as noted in patient's After Visit Summary       History of Present Illness:     Patient presents for Medicare Annual Wellness visit    Patient Care Team:  Uzma Gifford as PCP - General (Nurse Practitioner)  Julianna Overton MD as PCP - PCP-[employer] ([facility])  Bautista Gambino MD as PCP - [address] [place] [address] ([facility])     Problem List:     Patient Active Problem List   Diagnosis    Carpal tunnel syndrome    Dyslipidemia    Dyspnea    Hyperlipidemia    Lateral epicondylitis    Nicotine dependence    Screening for malignant neoplasm of breast    Vitamin D deficiency      Past Medical and Surgical History:     Past Medical History:   Diagnosis Date    Asthma     Gout 02/13/2021    Lateral epicondylitis 10/9/2015     Past Surgical History:   Procedure Laterality Date    IMPLANTATION VAGAL NERVE STIMULATOR        Family History:     Family History   Problem Relation Age of Onset    No Known Problems Mother     No Known Problems Sister     No Known Problems Maternal Grandmother     No Known Problems Paternal Grandmother     Cancer Paternal Uncle     No Known Problems Sister     No Known Problems Sister     No Known Problems Father     No Known Problems Maternal Grandfather     No Known Problems Paternal Grandfather       Social History:     Social History     Socioeconomic History    Marital status: Single     Spouse name: None    Number of children: None    Years of education: None    Highest education level: None   Occupational History  None   Tobacco Use    Smoking status: Former Smoker     Packs/day: 1 00     Types: Cigarettes     Quit date:      Years since quittin 0    Smokeless tobacco: Former User    Tobacco comment: exposure to passive smoke   Vaping Use    Vaping Use: Never used   Substance and Sexual Activity    Alcohol use: No    Drug use: No    Sexual activity: None   Other Topics Concern    None   Social History Narrative    None     Social Determinants of Health     Financial Resource Strain: Not on file   Food Insecurity: Not on file   Transportation Needs: Not on file   Physical Activity: Not on file   Stress: Not on file   Social Connections: Not on file   Intimate Partner Violence: Not on file   Housing Stability: Not on file      Medications and Allergies:     Current Outpatient Medications   Medication Sig Dispense Refill    albuterol (PROVENTIL HFA,VENTOLIN HFA) 90 mcg/act inhaler Inhale 1 puff every 6 (six) hours 1 Inhaler 3    aspirin (ECOTRIN) 325 mg EC tablet Take 1 tablet (325 mg total) by mouth daily 30 tablet 2    clindamycin (CLEOCIN T) 1 % lotion Apply topically 2 (two) times a day 60 mL 1    oxybutynin (DITROPAN) 5 mg tablet TAKE 1 TABLET(5 MG) BY MOUTH TWICE DAILY 180 tablet 1    pantoprazole (PROTONIX) 40 mg tablet TAKE 1 TABLET(40 MG) BY MOUTH DAILY 90 tablet 3     No current facility-administered medications for this visit       Allergies   Allergen Reactions    Lansoprazole Palpitations     Erythema/ tachycardia  Other reaction(s): tachycardia    Penicillins Itching and Rash     Other reaction(s): Rash  Other reaction(s): Rash      Immunizations:     Immunization History   Administered Date(s) Administered    COVID-19 MODERNA VACC 0 5 ML IM 2021, 2021    INFLUENZA 2015, 2016, 2017    Influenza, Quadrivalent (nasal) 2017    Influenza, injectable, quadrivalent, preservative free 0 5 mL 2020    Influenza, recombinant, quadrivalent,injectable, preservative free 10/12/2018, 11/02/2020    Tdap 09/04/2015      Health Maintenance:         Topic Date Due    Colorectal Cancer Screening  03/31/2019    HIV Screening  Completed    Hepatitis C Screening  Completed         Topic Date Due    Pneumococcal Vaccine: 65+ Years (1 of 1 - PPSV23) Never done    Influenza Vaccine (1) 09/01/2021    COVID-19 Vaccine (3 - Booster for Moderna series) 10/26/2021      Medicare Health Risk Assessment:     /90 (BP Location: Left arm, Patient Position: Sitting, Cuff Size: Large)   Pulse 63   Temp (!) 97 2 °F (36 2 °C) (Tympanic)   Resp 18   Ht 4' 7 5" (1 41 m)   Wt 89 4 kg (197 lb 3 2 oz)   SpO2 97%   BMI 45 01 kg/m²      Mili Raphael is here for her Subsequent Wellness visit  Health Risk Assessment:   Patient rates overall health as good  Patient feels that their physical health rating is same  Patient is satisfied with their life  Eyesight was rated as same  Hearing was rated as same  Patient feels that their emotional and mental health rating is same  Patients states they are never, rarely angry  Patient states they are never, rarely unusually tired/fatigued  Pain experienced in the last 7 days has been none  Patient states that she has experienced no weight loss or gain in last 6 months  Depression Screening:   PHQ-2 Score: 0      Fall Risk Screening: In the past year, patient has experienced: no history of falling in past year      Urinary Incontinence Screening:   Patient has not leaked urine accidently in the last six months  Home Safety:  Patient does not have trouble with stairs inside or outside of their home  Patient has working smoke alarms and has working carbon monoxide detector  Home safety hazards include: none  Nutrition:   Current diet is Regular  Medications:   Patient is currently taking over-the-counter supplements  OTC medications include: see medication list  Patient is able to manage medications       Activities of Daily Living (ADLs)/Instrumental Activities of Daily Living (IADLs):   Walk and transfer into and out of bed and chair?: Yes  Dress and groom yourself?: Yes    Bathe or shower yourself?: Yes    Feed yourself? Yes  Do your laundry/housekeeping?: Yes  Manage your money, pay your bills and track your expenses?: Yes  Make your own meals?: Yes    Do your own shopping?: Yes    Previous Hospitalizations:   Any hospitalizations or ED visits within the last 12 months?: No      Advance Care Planning:   Living will: No    Durable POA for healthcare: No    Advanced directive: No    Five wishes given: Yes      Cognitive Screening:   Provider or family/friend/caregiver concerned regarding cognition?: No    PREVENTIVE SCREENINGS      Cardiovascular Screening:    General: History Lipid Disorder and Risks and Benefits Discussed    Due for: Lipid Panel      Diabetes Screening:     General: Screening Current      Colorectal Cancer Screening:     General: Screening Current      Breast Cancer Screening:     General: Screening Current      Cervical Cancer Screening:    General: Screening Not Indicated      Osteoporosis Screening:    General: Risks and Benefits Discussed    Due for: Bone Density CT Axial      Abdominal Aortic Aneurysm (AAA) Screening:        General: Screening Not Indicated      Lung Cancer Screening:     General: Screening Not Indicated      Hepatitis C Screening:    General: Screening Current    Screening, Brief Intervention, and Referral to Treatment (SBIRT)    Screening  Typical number of drinks in a day: 0  Typical number of drinks in a week: 0  Interpretation: Low risk drinking behavior      AUDIT-C Screenin) How often did you have a drink containing alcohol in the past year? never  2) How many drinks did you have on a typical day when you were drinking in the past year? 0  3) How often did you have 6 or more drinks on one occasion in the past year? never    AUDIT-C Score: 0  Interpretation: Score 0-2 (female): Negative screen for alcohol misuse    Single Item Drug Screening:  How often have you used an illegal drug (including marijuana) or a prescription medication for non-medical reasons in the past year? never    Single Item Drug Screen Score: 0  Interpretation: Negative screen for possible drug use disorder    Other Counseling Topics:   Car/seat belt/driving safety, skin self-exam, sunscreen and calcium and vitamin D intake and regular weightbearing exercise  BRIE Golden BMI Counseling: Body mass index is 45 01 kg/m²  The BMI is above normal  Nutrition recommendations include reducing portion sizes, decreasing overall calorie intake, 3-5 servings of fruits/vegetables daily, reducing fast food intake, consuming healthier snacks, decreasing soda and/or juice intake, moderation in carbohydrate intake, increasing intake of lean protein, reducing intake of saturated fat and trans fat and reducing intake of cholesterol  Exercise recommendations include moderate aerobic physical activity for 150 minutes/week, exercising 3-5 times per week and strength training exercises

## 2022-01-13 NOTE — PATIENT INSTRUCTIONS
Medicare Preventive Visit Patient Instructions  Thank you for completing your Welcome to Medicare Visit or Medicare Annual Wellness Visit today  Your next wellness visit will be due in one year (1/14/2023)  The screening/preventive services that you may require over the next 5-10 years are detailed below  Some tests may not apply to you based off risk factors and/or age  Screening tests ordered at today's visit but not completed yet may show as past due  Also, please note that scanned in results may not display below  Preventive Screenings:  Service Recommendations Previous Testing/Comments   Colorectal Cancer Screening  * Colonoscopy    * Fecal Occult Blood Test (FOBT)/Fecal Immunochemical Test (FIT)  * Fecal DNA/Cologuard Test  * Flexible Sigmoidoscopy Age: 54-65 years old   Colonoscopy: every 10 years (may be performed more frequently if at higher risk)  OR  FOBT/FIT: every 1 year  OR  Cologuard: every 3 years  OR  Sigmoidoscopy: every 5 years  Screening may be recommended earlier than age 48 if at higher risk for colorectal cancer  Also, an individualized decision between you and your healthcare provider will decide whether screening between the ages of 74-80 would be appropriate  Colonoscopy: 03/31/2016  FOBT/FIT: Not on file  Cologuard: Not on file  Sigmoidoscopy: Not on file    Screening Current     Breast Cancer Screening Age: 36 years old  Frequency: every 1-2 years  Not required if history of left and right mastectomy Mammogram: 07/15/2021    Screening Current   Cervical Cancer Screening Between the ages of 21-29, pap smear recommended once every 3 years  Between the ages of 33-67, can perform pap smear with HPV co-testing every 5 years     Recommendations may differ for women with a history of total hysterectomy, cervical cancer, or abnormal pap smears in past  Pap Smear: 11/05/2018    Screening Not Indicated   Hepatitis C Screening Once for adults born between 1945 and 1965  More frequently in patients at high risk for Hepatitis C Hep C Antibody: Not on file    Screening Current   Diabetes Screening 1-2 times per year if you're at risk for diabetes or have pre-diabetes Fasting glucose: 78 mg/dL   A1C: 6 0 %    Screening Current   Cholesterol Screening Once every 5 years if you don't have a lipid disorder  May order more often based on risk factors  Lipid panel: 06/22/2021    History Lipid Disorder  Risks and Benefits Discussed  Due for Lipid Panel     Other Preventive Screenings Covered by Medicare:  1  Abdominal Aortic Aneurysm (AAA) Screening: covered once if your at risk  You're considered to be at risk if you have a family history of AAA  2  Lung Cancer Screening: covers low dose CT scan once per year if you meet all of the following conditions: (1) Age 50-69; (2) No signs or symptoms of lung cancer; (3) Current smoker or have quit smoking within the last 15 years; (4) You have a tobacco smoking history of at least 30 pack years (packs per day multiplied by number of years you smoked); (5) You get a written order from a healthcare provider  3  Glaucoma Screening: covered annually if you're considered high risk: (1) You have diabetes OR (2) Family history of glaucoma OR (3)  aged 48 and older OR (3)  American aged 72 and older  3  Osteoporosis Screening: covered every 2 years if you meet one of the following conditions: (1) You're estrogen deficient and at risk for osteoporosis based off medical history and other findings; (2) Have a vertebral abnormality; (3) On glucocorticoid therapy for more than 3 months; (4) Have primary hyperparathyroidism; (5) On osteoporosis medications and need to assess response to drug therapy  · Last bone density test (DXA Scan): 11/21/2018  5  HIV Screening: covered annually if you're between the age of 12-76  Also covered annually if you are younger than 13 and older than 72 with risk factors for HIV infection   For pregnant patients, it is covered up to 3 times per pregnancy  Immunizations:  Immunization Recommendations   Influenza Vaccine Annual influenza vaccination during flu season is recommended for all persons aged >= 6 months who do not have contraindications   Pneumococcal Vaccine (Prevnar and Pneumovax)  * Prevnar = PCV13  * Pneumovax = PPSV23   Adults 25-60 years old: 1-3 doses may be recommended based on certain risk factors  Adults 72 years old: Prevnar (PCV13) vaccine recommended followed by Pneumovax (PPSV23) vaccine  If already received PPSV23 since turning 65, then PCV13 recommended at least one year after PPSV23 dose  Hepatitis B Vaccine 3 dose series if at intermediate or high risk (ex: diabetes, end stage renal disease, liver disease)   Tetanus (Td) Vaccine - COST NOT COVERED BY MEDICARE PART B Following completion of primary series, a booster dose should be given every 10 years to maintain immunity against tetanus  Td may also be given as tetanus wound prophylaxis  Tdap Vaccine - COST NOT COVERED BY MEDICARE PART B Recommended at least once for all adults  For pregnant patients, recommended with each pregnancy  Shingles Vaccine (Shingrix) - COST NOT COVERED BY MEDICARE PART B  2 shot series recommended in those aged 48 and above     Health Maintenance Due:      Topic Date Due    Colorectal Cancer Screening  03/31/2019    HIV Screening  Completed    Hepatitis C Screening  Completed     Immunizations Due:      Topic Date Due    Pneumococcal Vaccine: 65+ Years (1 of 1 - PPSV23) Never done    Influenza Vaccine (1) 09/01/2021    COVID-19 Vaccine (3 - Booster for Moderna series) 10/26/2021     Advance Directives   What are advance directives? Advance directives are legal documents that state your wishes and plans for medical care  These plans are made ahead of time in case you lose your ability to make decisions for yourself   Advance directives can apply to any medical decision, such as the treatments you want, and if you want to donate organs  What are the types of advance directives? There are many types of advance directives, and each state has rules about how to use them  You may choose a combination of any of the following:  · Living will: This is a written record of the treatment you want  You can also choose which treatments you do not want, which to limit, and which to stop at a certain time  This includes surgery, medicine, IV fluid, and tube feedings  · Durable power of  for healthcare [facility] [place]): This is a written record that states who you want to make healthcare choices for you when you are unable to make them for yourself  This person, called a proxy, is usually a family member or a friend  You may choose more than 1 proxy  · Do not resuscitate (DNR) order:  A DNR order is used in case your heart stops beating or you stop breathing  It is a request not to have certain forms of treatment, such as CPR  A DNR order may be included in other types of advance directives  · Medical directive: This covers the care that you want if you are in a coma, near death, or unable to make decisions for yourself  You can list the treatments you want for each condition  Treatment may include pain medicine, surgery, blood transfusions, dialysis, IV or tube feedings, and a ventilator (breathing machine)  · Values history: This document has questions about your views, beliefs, and how you feel and think about life  This information can help others choose the care that you would choose  Why are advance directives important? An advance directive helps you control your care  Although spoken wishes may be used, it is better to have your wishes written down  Spoken wishes can be misunderstood, or not followed  Treatments may be given even if you do not want them  An advance directive may make it easier for your family to make difficult choices about your care     Weight Management   Why it is important to manage your weight: Being overweight increases your risk of health conditions such as heart disease, high blood pressure, type 2 diabetes, and certain types of cancer  It can also increase your risk for osteoarthritis, sleep apnea, and other respiratory problems  Aim for a slow, steady weight loss  Even a small amount of weight loss can lower your risk of health problems  How to lose weight safely:  A safe and healthy way to lose weight is to eat fewer calories and get regular exercise  You can lose up about 1 pound a week by decreasing the number of calories you eat by 500 calories each day  Healthy meal plan for weight management:  A healthy meal plan includes a variety of foods, contains fewer calories, and helps you stay healthy  A healthy meal plan includes the following:  · Eat whole-grain foods more often  A healthy meal plan should contain fiber  Fiber is the part of grains, fruits, and vegetables that is not broken down by your body  Whole-grain foods are healthy and provide extra fiber in your diet  Some examples of whole-grain foods are whole-wheat breads and pastas, oatmeal, brown rice, and bulgur  · Eat a variety of vegetables every day  Include dark, leafy greens such as spinach, kale, shahram greens, and mustard greens  Eat yellow and orange vegetables such as carrots, sweet potatoes, and winter squash  · Eat a variety of fruits every day  Choose fresh or canned fruit (canned in its own juice or light syrup) instead of juice  Fruit juice has very little or no fiber  · Eat low-fat dairy foods  Drink fat-free (skim) milk or 1% milk  Eat fat-free yogurt and low-fat cottage cheese  Try low-fat cheeses such as mozzarella and other reduced-fat cheeses  · Choose meat and other protein foods that are low in fat  Choose beans or other legumes such as split peas or lentils  Choose fish, skinless poultry (chicken or turkey), or lean cuts of red meat (beef or pork)   Before you cook meat or poultry, cut off any visible fat    · Use less fat and oil  Try baking foods instead of frying them  Add less fat, such as margarine, sour cream, regular salad dressing and mayonnaise to foods  Eat fewer high-fat foods  Some examples of high-fat foods include french fries, doughnuts, ice cream, and cakes  · Eat fewer sweets  Limit foods and drinks that are high in sugar  This includes candy, cookies, regular soda, and sweetened drinks  Exercise:  Exercise at least 30 minutes per day on most days of the week  Some examples of exercise include walking, biking, dancing, and swimming  You can also fit in more physical activity by taking the stairs instead of the elevator or parking farther away from stores  Ask your healthcare provider about the best exercise plan for you  © Copyright [employer] 2018 Information is for End User's use only and may not be sold, redistributed or otherwise used for commercial purposes  All illustrations and images included in CareNotes® are the copyrighted property of [employer]  or [address] [place], [place] [address]mariia Leo   Pneumococcal 13-Valent Vaccine, Diphtheria Conjugate (By injection)   Pneumococcal 13-Valent Vaccine, Diphtheria Conjugate (ABB-lbm-YBF-al 13-VAY-lent VAX-een, dif-THEER-ee-a DAT-ris-kgyp)  Prevents infections, such as pneumonia and meningitis  Brand Name(s): Prevnar 13   There may be other brand names for this medicine  When This Medicine Should Not Be Used: This vaccine is not right for everyone  You should not receive it if you had an allergic reaction to pneumococcal or diphtheria vaccine  How to Use This Medicine:   Injectable  · A nurse or other health provider will give you this medicine  This vaccine is usually given as a shot into a muscle in the thigh or upper arm  · The vaccine schedule is different for different people  ? Tell your doctor if your child was born prematurely  Children who were premature may need to follow a different schedule  ?  Children younger than 10years of age: This vaccine is usually given as 3 or 4 separate shots over several months  Your child's doctor will tell you how many shots are needed and when to come back for the next one   ? Children older than 10years of age: This vaccine is given as a single shot  If your child recently received another pneumonia vaccine, this one should be given at least 8 weeks later  ? Adults older than 25years of age: This vaccine is given as a single dose  · It is very important for your child to receive all of the shots for the vaccine  · Missed dose: This vaccine must be given on a fixed schedule  If your child misses a dose, call your child's doctor for another appointment  Drugs and Foods to Avoid:   Ask your doctor or pharmacist before using any other medicine, including over-the-counter medicines, vitamins, and herbal products  · Some foods and medicines can affect how this vaccine works  Tell your doctor if you are receiving a treatment or medicine that causes a weak immune system  This includes radiation treatment, steroid medicine (including hydrocortisone, methylprednisolone, prednisolone, prednisone), or cancer medicine  Warnings While Using This Medicine:   · Tell your doctor if you are pregnant or breastfeeding  · Tell your doctor if you have a weak immune system  You may not be fully protected by this vaccine    Possible Side Effects While Using This Medicine:   Call your doctor right away if you notice any of these side effects:  · Allergic reaction: Itching or hives, swelling in your face or hands, swelling or tingling in your mouth or throat, chest tightness, trouble breathing  · High fever  If you notice these less serious side effects, talk with your doctor:   · Crying, irritability, or fussiness  · Joint or muscle pain  · Mild skin rash  · Pain, burning, redness, or swelling where the shot was given  · Poor appetite  · Sleep changes  If you notice other side effects that you think are caused by this medicine, tell your doctor  Call your doctor for medical advice about side effects  You may report side effects to FDA at 9-223-FDA-2021    © Copyright [employer] 2021 Information is for End User's use only and may not be sold, redistributed or otherwise used for commercial purposes  The above information is an  only  It is not intended as medical advice for individual conditions or treatments  Talk to your doctor, nurse or pharmacist before following any medical regimen to see if it is safe and effective for you  Flu Shot (Vaccine) for Adults   WHAT YOU NEED TO KNOW:   Several types of viruses cause the flu  The viruses change over time, so new vaccines are made each year  Get the vaccine as soon as recommended each year, usually in September or October  The vaccine begins to protect you about 2 weeks after you get it  The flu vaccine may cause mild symptoms, such as a fever, headache, and muscle aches  You may still get the flu after you receive the vaccine  DISCHARGE INSTRUCTIONS:   Call your local emergency number (911 in the [address] [place] [address],[address]) if:   · Your mouth and throat are swollen  · You are wheezing or having trouble breathing  · You have chest pain or your heart is beating faster than normal for you  · You feel like you are going to faint  Return to the emergency department if:   · Your face is red or swollen  · You have hives that spread over your body  Call your doctor if:   · You feel weak or dizzy  · You have increased pain, redness, or swelling around the area where the shot was given  · You have questions or concerns about the flu shot  Apply a warm compress  to the injection area to decrease pain and swelling  Follow up with your doctor as directed:  Write down your questions so you remember to ask them during your visits  © [employer] 2021 Information is for End User's use only and may not be sold, redistributed or otherwise used for commercial purposes  All illustrations and images included in CareNotes® are the copyrighted property of A [employer] A M , Inc  or Teagan Foreman  The above information is an  only  It is not intended as medical advice for individual conditions or treatments  Talk to your doctor, nurse or pharmacist before following any medical regimen to see if it is safe and effective for you

## 2022-01-13 NOTE — PROGRESS NOTES
Assessment/Plan:         Diagnoses and all orders for this visit:    Medicare annual wellness visit, subsequent    Need for vaccination  -     influenza vaccine, high-dose, PF 0 7 mL (FLUZONE HIGH-DOSE)  -     PNEUMOCOCCAL CONJUGATE VACCINE 13-VALENT GREATER THAN 6 MONTHS    Morbid obesity with BMI of 45 0-49 9, adult ([facility] )    Procedure refused  Comments:  dexa    Dyslipidemia  -     Lipid Panel with Direct LDL reflex; Future  -     Comprehensive metabolic panel; Future    Stage 3 chronic kidney disease, unspecified whether stage 3a or 3b CKD ([facility] )        Lost her taste and had a headache last week    Think she may have had covid   Subjective:      Patient ID: Rodolfo Sanabria is a 73 y o  female  HPI  Here for medicare wellness     Last week had headache and congestion and loss of smell  Today has continued headache  Never was tested  The following portions of the patient's history were reviewed and updated as appropriate: allergies, current medications, past family history, past medical history, past social history, past surgical history and problem list     Review of Systems   Constitutional: Negative for activity change, appetite change, chills, fatigue and fever  HENT: Negative for congestion, ear pain, rhinorrhea and sore throat  Eyes: Negative for pain and visual disturbance  Respiratory: Negative for cough, chest tightness and shortness of breath  Cardiovascular: Negative for chest pain and palpitations  Gastrointestinal: Negative for abdominal pain and vomiting  Genitourinary: Negative for dysuria, frequency, hematuria and urgency  Musculoskeletal: Negative for arthralgias and back pain  Skin: Negative for color change and rash  Neurological: Positive for headaches  Negative for dizziness, seizures, syncope, light-headedness and numbness  Psychiatric/Behavioral: The patient is not nervous/anxious  All other systems reviewed and are negative          Objective:  Vitals: 01/13/22 1024   BP: 164/90   BP Location: Left arm   Patient Position: Sitting   Cuff Size: Large   Pulse: 63   Resp: 18   Temp: (!) 97 2 °F (36 2 °C)   TempSrc: Tympanic   SpO2: 97%   Weight: 89 4 kg (197 lb 3 2 oz)   Height: 4' 7 5" (1 41 m)      Physical Exam  Vitals reviewed  Constitutional:       Appearance: Normal appearance  She is well-developed  She is obese  HENT:      Head: Normocephalic  Right Ear: Tympanic membrane, ear canal and external ear normal       Left Ear: Tympanic membrane, ear canal and external ear normal    Eyes:      General:         Right eye: No discharge  Left eye: No discharge  Conjunctiva/sclera: Conjunctivae normal       Pupils: Pupils are equal, round, and reactive to light  Neck:      Thyroid: No thyromegaly  Cardiovascular:      Rate and Rhythm: Normal rate and regular rhythm  Pulses: Normal pulses  Heart sounds: Normal heart sounds  No murmur heard  Pulmonary:      Effort: Pulmonary effort is normal       Breath sounds: Normal breath sounds  Abdominal:      General: Bowel sounds are normal       Palpations: Abdomen is soft  Tenderness: There is no abdominal tenderness  Musculoskeletal:         General: Normal range of motion  Cervical back: Normal range of motion and neck supple  Lymphadenopathy:      Cervical: No cervical adenopathy  Skin:     General: Skin is warm  Findings: No rash  Neurological:      General: No focal deficit present  Mental Status: She is alert and oriented to person, place, and time  Psychiatric:         Mood and Affect: Mood normal          Behavior: Behavior normal          Thought Content:  Thought content normal          Judgment: Judgment normal

## 2022-03-02 DIAGNOSIS — L71.9 ROSACEA: ICD-10-CM

## 2022-03-02 DIAGNOSIS — R06.00 DYSPNEA, UNSPECIFIED TYPE: ICD-10-CM

## 2022-03-02 RX ORDER — CLINDAMYCIN PHOSPHATE 10 UG/ML
LOTION TOPICAL 2 TIMES DAILY
Qty: 60 ML | Refills: 1 | Status: SHIPPED | OUTPATIENT
Start: 2022-03-02

## 2022-03-02 RX ORDER — ALBUTEROL SULFATE 90 UG/1
1 AEROSOL, METERED RESPIRATORY (INHALATION) EVERY 6 HOURS
Qty: 16 G | Refills: 5 | Status: SHIPPED | OUTPATIENT
Start: 2022-03-02

## 2022-05-04 NOTE — PROGRESS NOTES
Assessment/Plan:         Diagnoses and all orders for this visit:    Non-recurrent acute serous otitis media of right ear  -     azithromycin (ZITHROMAX) 250 mg tablet; Take 2 tablets today then 1 tablet daily x 4 days    Morbid obesity with BMI of 45 0-49 9, adult (HCC)    Lymphadenopathy  -     azithromycin (ZITHROMAX) 250 mg tablet; Take 2 tablets today then 1 tablet daily x 4 days    Immunization due  -     influenza vaccine, 5593-3713, quadrivalent, 0 5 mL, preservative-free, for adult and pediatric patients 6 mos+ (AFLURIA, FLUARIX, FLULAVAL, FLUZONE)      return for physical with labs and update  Subjective:      Patient ID: Simón Magaña is a 62 y o  female  HPI  Started with right ear ache and had large lymph node  Was using peroxide  Going on for about 6 days  The following portions of the patient's history were reviewed and updated as appropriate: allergies, current medications, past family history, past medical history, past social history, past surgical history and problem list     Review of Systems   Constitutional: Negative for activity change, appetite change, chills, fatigue and fever  HENT: Positive for ear pain  Negative for congestion, postnasal drip, sinus pain and sore throat  Respiratory: Negative for cough  Cardiovascular: Negative for chest pain  Genitourinary: Negative for dysuria and urgency  Neurological: Negative for dizziness and headaches  Hematological: Positive for adenopathy  Objective:  Vitals:    01/21/20 0828   BP: 130/80   BP Location: Left arm   Patient Position: Sitting   Cuff Size: Large   Pulse: 80   Resp: 18   Temp: 98 °F (36 7 °C)   TempSrc: Oral   Weight: 95 3 kg (210 lb)   Height: 4' 7" (1 397 m)      Physical Exam   Constitutional: She is oriented to person, place, and time  She appears well-developed and well-nourished     HENT:   Right Ear: External ear normal    Left Ear: External ear normal    Nose: Nose normal    Mouth/Throat: Oropharynx is clear and moist    Neck: Neck supple  Cardiovascular: Normal rate, regular rhythm and normal heart sounds  Pulmonary/Chest: Effort normal and breath sounds normal    Lymphadenopathy:     She has cervical adenopathy  Neurological: She is alert and oriented to person, place, and time  Skin: Skin is warm  Vitals reviewed  BMI Counseling: Body mass index is 48 81 kg/m²  The BMI is above normal  Nutrition recommendations include reducing portion sizes, decreasing overall calorie intake, 3-5 servings of fruits/vegetables daily, reducing fast food intake, consuming healthier snacks, decreasing soda and/or juice intake and moderation in carbohydrate intake  Exercise recommendations include exercising 3-5 times per week  negative...

## 2022-07-07 ENCOUNTER — APPOINTMENT (OUTPATIENT)
Dept: LAB | Facility: HOSPITAL | Age: 67
End: 2022-07-07
Payer: COMMERCIAL

## 2022-07-07 DIAGNOSIS — E78.5 DYSLIPIDEMIA: ICD-10-CM

## 2022-07-07 LAB
ALBUMIN SERPL BCP-MCNC: 4.5 G/DL (ref 3–5.2)
ALP SERPL-CCNC: 68 U/L (ref 43–122)
ALT SERPL W P-5'-P-CCNC: 40 U/L
ANION GAP SERPL CALCULATED.3IONS-SCNC: 12 MMOL/L (ref 5–14)
AST SERPL W P-5'-P-CCNC: 46 U/L (ref 14–36)
BILIRUB SERPL-MCNC: 0.73 MG/DL
BUN SERPL-MCNC: 19 MG/DL (ref 5–25)
CALCIUM SERPL-MCNC: 9.2 MG/DL (ref 8.4–10.2)
CHLORIDE SERPL-SCNC: 107 MMOL/L (ref 97–108)
CHOLEST SERPL-MCNC: 240 MG/DL
CO2 SERPL-SCNC: 26 MMOL/L (ref 22–30)
CREAT SERPL-MCNC: 1.02 MG/DL (ref 0.6–1.2)
GFR SERPL CREATININE-BSD FRML MDRD: 57 ML/MIN/1.73SQ M
GLUCOSE P FAST SERPL-MCNC: 99 MG/DL (ref 70–99)
HDLC SERPL-MCNC: 51 MG/DL
LDLC SERPL CALC-MCNC: 146 MG/DL
POTASSIUM SERPL-SCNC: 4.2 MMOL/L (ref 3.6–5)
PROT SERPL-MCNC: 8.6 G/DL (ref 5.9–8.4)
SODIUM SERPL-SCNC: 145 MMOL/L (ref 137–147)
TRIGL SERPL-MCNC: 213 MG/DL

## 2022-07-07 PROCEDURE — 80061 LIPID PANEL: CPT

## 2022-07-07 PROCEDURE — 36415 COLL VENOUS BLD VENIPUNCTURE: CPT

## 2022-07-07 PROCEDURE — 80053 COMPREHEN METABOLIC PANEL: CPT

## 2022-07-13 ENCOUNTER — OFFICE VISIT (OUTPATIENT)
Dept: FAMILY MEDICINE CLINIC | Facility: CLINIC | Age: 67
End: 2022-07-13
Payer: COMMERCIAL

## 2022-07-13 VITALS
OXYGEN SATURATION: 97 % | BODY MASS INDEX: 45.88 KG/M2 | TEMPERATURE: 97.8 F | DIASTOLIC BLOOD PRESSURE: 90 MMHG | RESPIRATION RATE: 18 BRPM | WEIGHT: 201 LBS | HEART RATE: 62 BPM | SYSTOLIC BLOOD PRESSURE: 140 MMHG

## 2022-07-13 DIAGNOSIS — Z12.11 SCREEN FOR COLON CANCER: Primary | ICD-10-CM

## 2022-07-13 DIAGNOSIS — Z12.31 ENCOUNTER FOR SCREENING MAMMOGRAM FOR MALIGNANT NEOPLASM OF BREAST: ICD-10-CM

## 2022-07-13 PROCEDURE — 99214 OFFICE O/P EST MOD 30 MIN: CPT | Performed by: NURSE PRACTITIONER

## 2022-07-13 RX ORDER — CLINDAMYCIN HYDROCHLORIDE 300 MG/1
CAPSULE ORAL
COMMUNITY
Start: 2022-07-07

## 2022-07-13 RX ORDER — SULFAMETHOXAZOLE AND TRIMETHOPRIM 800; 160 MG/1; MG/1
TABLET ORAL
COMMUNITY
End: 2022-07-13 | Stop reason: ALTCHOICE

## 2022-07-13 NOTE — PROGRESS NOTES
Assessment/Plan:         Diagnoses and all orders for this visit:    Screen for colon cancer  -     Cologuard    Encounter for screening mammogram for malignant neoplasm of breast  -     Mammo screening bilateral w 3d & cad; Future    Other orders  -     clindamycin (CLEOCIN) 300 MG capsule; TAKE 1 CAPSULE BY MOUTH TWICE DAILY FOR 5 DAYS  -     Discontinue: sulfamethoxazole-trimethoprim (BACTRIM DS) 800-160 mg per tablet; sulfamethoxazole 800 mg-trimethoprim 160 mg tablet   TAKE 1 TABLET BY MOUTH TWICE DAILY (Patient not taking: Reported on 7/13/2022)          Subjective:      Patient ID: Terry Yoo is a 73 y o  female  HPI  Here to review the labs   discussed LFTs   Improved but still up    Nondrinker  Discussed diet  Discussed cholesterol and Trig and offending foods as well as the LDL    HDL is good  Discussed exercise and wt loss      The following portions of the patient's history were reviewed and updated as appropriate: allergies, current medications, past family history, past medical history, past social history, past surgical history and problem list     Review of Systems   Constitutional: Negative for activity change, appetite change, chills and fever  HENT: Negative for congestion, ear pain, postnasal drip, rhinorrhea and sore throat  Eyes: Negative for pain and visual disturbance  Respiratory: Negative for cough and shortness of breath  Cardiovascular: Negative for chest pain and palpitations  Gastrointestinal: Negative for abdominal pain, constipation, diarrhea and vomiting  Genitourinary: Negative for dysuria and hematuria  Musculoskeletal: Negative for arthralgias and back pain  Skin: Negative for color change and rash  Neurological: Negative for seizures, syncope and headaches  Psychiatric/Behavioral: The patient is not nervous/anxious  All other systems reviewed and are negative          Objective:  Vitals:    07/13/22 1030   BP: 140/90   BP Location: Left arm Patient Position: Sitting   Cuff Size: Large   Pulse: 62   Resp: 18   Temp: 97 8 °F (36 6 °C)   TempSrc: Temporal   SpO2: 97%   Weight: 91 2 kg (201 lb)      Physical Exam  Vitals reviewed  Constitutional:       Appearance: Normal appearance  She is obese  HENT:      Right Ear: Tympanic membrane, ear canal and external ear normal       Left Ear: Tympanic membrane, ear canal and external ear normal    Eyes:      Conjunctiva/sclera: Conjunctivae normal    Cardiovascular:      Rate and Rhythm: Normal rate and regular rhythm  Pulses: Normal pulses  Heart sounds: Normal heart sounds  Pulmonary:      Effort: Pulmonary effort is normal       Breath sounds: Normal breath sounds  Abdominal:      General: Bowel sounds are normal    Musculoskeletal:      Cervical back: Normal range of motion and neck supple  Comments: Trace edema in lower extremitis   Skin:     General: Skin is warm  Findings: Rash present  Neurological:      Mental Status: She is alert and oriented to person, place, and time  Psychiatric:         Mood and Affect: Mood normal          Behavior: Behavior normal          Thought Content:  Thought content normal          Judgment: Judgment normal

## 2022-08-31 ENCOUNTER — HOSPITAL ENCOUNTER (OUTPATIENT)
Dept: MAMMOGRAPHY | Facility: CLINIC | Age: 67
Discharge: HOME | End: 2022-08-31
Payer: COMMERCIAL

## 2022-08-31 DIAGNOSIS — Z12.31 ENCOUNTER FOR SCREENING MAMMOGRAM FOR MALIGNANT NEOPLASM OF BREAST: ICD-10-CM

## 2022-08-31 PROCEDURE — 77067 SCR MAMMO BI INCL CAD: CPT

## 2022-08-31 PROCEDURE — 77063 BREAST TOMOSYNTHESIS BI: CPT

## 2022-09-04 DIAGNOSIS — K21.9 GASTROESOPHAGEAL REFLUX DISEASE WITHOUT ESOPHAGITIS: ICD-10-CM

## 2022-09-05 NOTE — PATIENT INSTRUCTIONS
Influenza Vaccine   WHAT YOU NEED TO KNOW:   The influenza vaccine is an injection given to help prevent influenza (flu)  The flu is caused by a virus  The virus spreads from person to person through coughing and sneezing  Several types of viruses cause the flu  The viruses change over time, so new vaccines are made each year  The vaccine begins to protect you about 2 weeks after you get it  The flu shot usually injected into your upper arm  It may be given in your thigh  You may get a vaccine with a weak or dead virus  DISCHARGE INSTRUCTIONS:   Call 911 for any of the following:   · Your mouth and throat are swollen  · You are wheezing or have trouble breathing  · You have chest pain or your heart is beating faster than normal for you  · You feel like you are going to faint  Return to the emergency department if:   · Your face is red or swollen  · You have hives that spread over your body  · You feel weak or dizzy  Contact your healthcare provider if:   · You have increased pain, redness, or swelling around the area where the shot was given  · You have questions or concerns about the influenza vaccine  Apply a warm compress  to the injection area if you got a flu shot  Apply the compress as directed to decrease pain and swelling  Follow up with your healthcare provider as directed:  Write down your questions so you remember to ask them during your visits  © 2017 [facility] Information is for End User's use only and may not be sold, redistributed or otherwise used for commercial purposes  All illustrations and images included in CareNotes® are the copyrighted property of A D A M , Inc  or Edilberto Worthington  The above information is an  only  It is not intended as medical advice for individual conditions or treatments  Talk to your doctor, nurse or pharmacist before following any medical regimen to see if it is safe and effective for you 
DYSURIA/URINARY FREQUENCY

## 2022-09-06 DIAGNOSIS — K21.9 GASTROESOPHAGEAL REFLUX DISEASE WITHOUT ESOPHAGITIS: ICD-10-CM

## 2022-09-06 RX ORDER — PANTOPRAZOLE SODIUM 40 MG/1
TABLET, DELAYED RELEASE ORAL
Qty: 90 TABLET | Refills: 3 | Status: SHIPPED | OUTPATIENT
Start: 2022-09-06

## 2022-09-06 RX ORDER — PANTOPRAZOLE SODIUM 40 MG/1
40 TABLET, DELAYED RELEASE ORAL DAILY
Qty: 90 TABLET | Refills: 3 | Status: SHIPPED | OUTPATIENT
Start: 2022-09-06

## 2022-09-10 DIAGNOSIS — R32 URINARY INCONTINENCE, UNSPECIFIED TYPE: ICD-10-CM

## 2022-09-13 RX ORDER — OXYBUTYNIN CHLORIDE 5 MG/1
TABLET ORAL
Qty: 180 TABLET | Refills: 1 | Status: SHIPPED | OUTPATIENT
Start: 2022-09-13

## 2023-01-16 ENCOUNTER — OFFICE VISIT (OUTPATIENT)
Dept: FAMILY MEDICINE CLINIC | Facility: CLINIC | Age: 68
End: 2023-01-16

## 2023-01-16 VITALS
WEIGHT: 198.2 LBS | HEART RATE: 64 BPM | SYSTOLIC BLOOD PRESSURE: 122 MMHG | TEMPERATURE: 97.2 F | DIASTOLIC BLOOD PRESSURE: 64 MMHG | OXYGEN SATURATION: 96 % | BODY MASS INDEX: 45.24 KG/M2 | RESPIRATION RATE: 16 BRPM

## 2023-01-16 DIAGNOSIS — E55.9 VITAMIN D DEFICIENCY: ICD-10-CM

## 2023-01-16 DIAGNOSIS — Z00.00 MEDICARE ANNUAL WELLNESS VISIT, SUBSEQUENT: Primary | ICD-10-CM

## 2023-01-16 DIAGNOSIS — E78.5 HYPERLIPIDEMIA, UNSPECIFIED HYPERLIPIDEMIA TYPE: ICD-10-CM

## 2023-01-16 DIAGNOSIS — R73.01 IMPAIRED FASTING BLOOD SUGAR: ICD-10-CM

## 2023-01-16 DIAGNOSIS — E66.01 MORBID OBESITY WITH BMI OF 45.0-49.9, ADULT (HCC): ICD-10-CM

## 2023-01-16 DIAGNOSIS — Z23 IMMUNIZATION DUE: ICD-10-CM

## 2023-01-16 DIAGNOSIS — N18.30 STAGE 3 CHRONIC KIDNEY DISEASE, UNSPECIFIED WHETHER STAGE 3A OR 3B CKD (HCC): ICD-10-CM

## 2023-01-16 NOTE — PROGRESS NOTES
Assessment and Plan:     Problem List Items Addressed This Visit        Genitourinary    Stage 3 chronic kidney disease, unspecified whether stage 3a or 3b CKD ([facility] )    Relevant Orders    CBC and differential    Comprehensive metabolic panel       Other    Hyperlipidemia    Relevant Orders    Lipid panel    Vitamin D deficiency    Relevant Orders    Vitamin D 25 hydroxy   Other Visit Diagnoses     Medicare annual wellness visit, subsequent    -  Primary    Morbid obesity with BMI of 45 0-49 9, adult ([facility] )        Relevant Orders    Lipid panel    TSH, 3rd generation with Free T4 reflex    Impaired fasting blood sugar        Relevant Orders    Comprehensive metabolic panel    Hemoglobin A1C    Immunization due        Relevant Orders    Pneumococcal Conjugate Vaccine 20-valent (Pcv20) (Completed)    influenza vaccine, high-dose, PF 0 7 mL (FLUZONE HIGH-DOSE) (Completed)           Preventive health issues were discussed with patient, and age appropriate screening tests were ordered as noted in patient's After Visit Summary  Personalized health advice and appropriate referrals for health education or preventive services given if needed, as noted in patient's After Visit Summary  History of Present Illness:     Patient presents for a Medicare Wellness Visit    Today for Medicare annual wellness as well as follow-up on chronic conditions  He has known CKD with her last creatinine and GFR at 57 placing her at stage IIIa  She does have mixed hyperlipidemia and is not currently on any statin medication  Last cholesterol showed a total cholesterol of 240, triglycerides at 213, and LDL at 146  He does have a history of impaired fasting glucose without diagnosis of prediabetes or diabetes  We did discuss with diet and exercise for weight loss       Patient Care Team:  Ibeth Abdalla as PCP - General (Nurse Practitioner)  Ori Ivey MD as PCP - PCP-[employer] (RTE)  Mark Barajas MD as PCP - PCP-[facility] (RTE)  Lamine Pisano MD as PCP - PCP-[facility] (RTE)     Review of Systems:     Review of Systems   Constitutional: Negative for chills, fatigue and fever  Respiratory: Negative for cough and shortness of breath  Cardiovascular: Negative for chest pain, palpitations and leg swelling  Gastrointestinal: Negative for abdominal pain  Genitourinary: Negative for dysuria  Psychiatric/Behavioral: Negative for dysphoric mood  The patient is not nervous/anxious           Problem List:     Patient Active Problem List   Diagnosis   • Carpal tunnel syndrome   • Dyslipidemia   • Dyspnea   • Hyperlipidemia   • Lateral epicondylitis   • Nicotine dependence   • Screening for malignant neoplasm of breast   • Vitamin D deficiency   • Stage 3 chronic kidney disease, unspecified whether stage 3a or 3b CKD ([facility])      Past Medical and Surgical History:     Past Medical History:   Diagnosis Date   • Asthma    • Gout 02/13/2021   • Lateral epicondylitis 10/9/2015   • Stage 3 chronic kidney disease, unspecified whether stage 3a or 3b CKD ([facility] ) 1/13/2022     Past Surgical History:   Procedure Laterality Date   • IMPLANTATION VAGAL NERVE STIMULATOR        Family History:     Family History   Problem Relation Age of Onset   • No Known Problems Mother    • No Known Problems Sister    • No Known Problems Maternal Grandmother    • No Known Problems Paternal Grandmother    • Cancer Paternal Uncle    • No Known Problems Sister    • No Known Problems Sister    • No Known Problems Father    • No Known Problems Maternal Grandfather    • No Known Problems Paternal Grandfather       Social History:     Social History     Socioeconomic History   • Marital status: Single     Spouse name: None   • Number of children: None   • Years of education: None   • Highest education level: None   Occupational History   • None   Tobacco Use   • Smoking status: Former     Packs/day: 1 00     Types: Cigarettes     Quit date: 2015 Years since quittin 0   • Smokeless tobacco: Former   • Tobacco comments:     exposure to passive smoke   Vaping Use   • Vaping Use: Never used   Substance and Sexual Activity   • Alcohol use: No   • Drug use: No   • Sexual activity: None   Other Topics Concern   • None   Social History Narrative   • None     Social Determinants of Health     Financial Resource Strain: Low Risk    • Difficulty of Paying Living Expenses: Not hard at all   Food Insecurity: Not on file   Transportation Needs: No Transportation Needs   • Lack of Transportation (Medical): No   • Lack of Transportation (Non-Medical): No   Physical Activity: Not on file   Stress: Not on file   Social Connections: Not on file   Intimate Partner Violence: Not on file   Housing Stability: Not on file      Medications and Allergies:     Current Outpatient Medications   Medication Sig Dispense Refill   • pantoprazole (PROTONIX) 40 mg tablet TAKE 1 TABLET(40 MG) BY MOUTH DAILY 90 tablet 3   • albuterol (PROVENTIL HFA,VENTOLIN HFA) 90 mcg/act inhaler Inhale 1 puff every 6 (six) hours 16 g 5   • aspirin (ECOTRIN) 325 mg EC tablet Take 1 tablet (325 mg total) by mouth daily 30 tablet 2   • clindamycin (CLEOCIN T) 1 % lotion Apply topically 2 (two) times a day 60 mL 1   • clindamycin (CLEOCIN) 300 MG capsule TAKE 1 CAPSULE BY MOUTH TWICE DAILY FOR 5 DAYS     • oxybutynin (DITROPAN) 5 mg tablet TAKE 1 TABLET(5 MG) BY MOUTH TWICE DAILY 180 tablet 1   • pantoprazole (PROTONIX) 40 mg tablet Take 1 tablet (40 mg total) by mouth daily 90 tablet 3     No current facility-administered medications for this visit       Allergies   Allergen Reactions   • Lansoprazole Palpitations     Erythema/ tachycardia  Other reaction(s): tachycardia   • Penicillins Itching and Rash     Other reaction(s): Rash  Other reaction(s): Rash      Immunizations:     Immunization History   Administered Date(s) Administered   • COVID-19 MODERNA VACC 0 5 ML IM 2021, 2021   • COVID-19, unspecified 05/01/2021   • INFLUENZA 11/07/2015, 11/14/2016, 11/01/2017, 01/13/2022   • Influenza, Quadrivalent (nasal) 11/01/2017   • Influenza, high dose seasonal 0 7 mL 01/13/2022, 01/16/2023   • Influenza, injectable, quadrivalent, preservative free 0 5 mL 01/21/2020   • Influenza, recombinant, quadrivalent,injectable, preservative free 10/12/2018, 11/02/2020   • Pneumococcal Conjugate 13-Valent 01/13/2022   • Pneumococcal Conjugate Vaccine 20-valent (Pcv20), Polysace 01/16/2023   • Tdap 09/04/2015   • influenza, injectable, quadrivalent 05/01/2022      Health Maintenance:         Topic Date Due   • Colorectal Cancer Screening  03/31/2019   • Hepatitis C Screening  Completed         Topic Date Due   • COVID-19 Vaccine (4 - Booster for Diane Slocumb series) 06/26/2021      Medicare Screening Tests and Risk Assessments:     Latasha Slater is here for her Subsequent Wellness visit  Health Risk Assessment:   Patient rates overall health as good  Patient feels that their physical health rating is same  Patient is satisfied with their life  Eyesight was rated as same  Hearing was rated as same  Patient feels that their emotional and mental health rating is same  Patients states they are never, rarely angry  Patient states they are never, rarely unusually tired/fatigued  Pain experienced in the last 7 days has been none  Patient states that she has experienced no weight loss or gain in last 6 months  Depression Screening:   PHQ-2 Score: 0      Fall Risk Screening: In the past year, patient has experienced: no history of falling in past year      Urinary Incontinence Screening:   Patient has not leaked urine accidently in the last six months  Home Safety:  Patient does not have trouble with stairs inside or outside of their home  Patient has working smoke alarms and has working carbon monoxide detector  Home safety hazards include: none  Nutrition:   Current diet is Regular       Medications:   Patient is currently taking over-the-counter supplements  OTC medications include: see medication list  Patient is able to manage medications  Activities of Daily Living (ADLs)/Instrumental Activities of Daily Living (IADLs):   Walk and transfer into and out of bed and chair?: Yes  Dress and groom yourself?: Yes    Bathe or shower yourself?: Yes    Feed yourself? Yes  Do your laundry/housekeeping?: Yes  Manage your money, pay your bills and track your expenses?: Yes  Make your own meals?: Yes    Do your own shopping?: Yes    Previous Hospitalizations:   Any hospitalizations or ED visits within the last 12 months?: No      Advance Care Planning:   Living will: No    Durable POA for healthcare: No    Advanced directive: No    Advanced directive counseling given: Yes    Five wishes given: Yes      Cognitive Screening:   Provider or family/friend/caregiver concerned regarding cognition?: No    PREVENTIVE SCREENINGS      Cardiovascular Screening:    General: Screening Not Indicated and History Lipid Disorder      Diabetes Screening:     General: Screening Current      Colorectal Cancer Screening:     General: Screening Current      Breast Cancer Screening:     General: Screening Current      Cervical Cancer Screening:    General: Screening Not Indicated      Lung Cancer Screening:     General: Screening Not Indicated      Hepatitis C Screening:    General: Screening Current    Screening, Brief Intervention, and Referral to Treatment (SBIRT)    Screening  Typical number of drinks in a day: 0  Typical number of drinks in a week: 0  Interpretation: Low risk drinking behavior  Single Item Drug Screening:  How often have you used an illegal drug (including marijuana) or a prescription medication for non-medical reasons in the past year? never    Single Item Drug Screen Score: 0  Interpretation: Negative screen for possible drug use disorder    No results found       Physical Exam:     /64 (BP Location: Left arm, Patient Position: Sitting, Cuff Size: Adult)   Pulse 64   Temp (!) 97 2 °F (36 2 °C)   Resp 16   Wt 89 9 kg (198 lb 3 2 oz)   SpO2 96%   BMI 45 24 kg/m²     Physical Exam  Vitals reviewed  Constitutional:       Appearance: Normal appearance  She is obese  Neck:      Vascular: No carotid bruit  Cardiovascular:      Rate and Rhythm: Normal rate and regular rhythm  Pulses: Normal pulses  Heart sounds: Normal heart sounds  Musculoskeletal:      Cervical back: Normal range of motion and neck supple  Lymphadenopathy:      Cervical: No cervical adenopathy  Skin:     General: Skin is warm and dry  Capillary Refill: Capillary refill takes less than 2 seconds  Neurological:      Mental Status: She is alert and oriented to person, place, and time     Psychiatric:         Mood and Affect: Mood normal          Behavior: Behavior normal           BRIE Carlisle

## 2023-01-16 NOTE — PATIENT INSTRUCTIONS
Medicare Preventive Visit Patient Instructions  Thank you for completing your Welcome to Medicare Visit or Medicare Annual Wellness Visit today  Your next wellness visit will be due in one year (1/17/2024)  The screening/preventive services that you may require over the next 5-10 years are detailed below  Some tests may not apply to you based off risk factors and/or age  Screening tests ordered at today's visit but not completed yet may show as past due  Also, please note that scanned in results may not display below  Preventive Screenings:  Service Recommendations Previous Testing/Comments   Colorectal Cancer Screening  * Colonoscopy    * Fecal Occult Blood Test (FOBT)/Fecal Immunochemical Test (FIT)  * Fecal DNA/Cologuard Test  * Flexible Sigmoidoscopy Age: 39-70 years old   Colonoscopy: every 10 years (may be performed more frequently if at higher risk)  OR  FOBT/FIT: every 1 year  OR  Cologuard: every 3 years  OR  Sigmoidoscopy: every 5 years  Screening may be recommended earlier than age 39 if at higher risk for colorectal cancer  Also, an individualized decision between you and your healthcare provider will decide whether screening between the ages of 74-80 would be appropriate  Colonoscopy: 03/31/2016  FOBT/FIT: Not on file  Cologuard: Not on file  Sigmoidoscopy: Not on file    Screening Current     Breast Cancer Screening Age: 36 years old  Frequency: every 1-2 years  Not required if history of left and right mastectomy Mammogram: 08/31/2022    Screening Current   Cervical Cancer Screening Between the ages of 21-29, pap smear recommended once every 3 years  Between the ages of 33-67, can perform pap smear with HPV co-testing every 5 years     Recommendations may differ for women with a history of total hysterectomy, cervical cancer, or abnormal pap smears in past  Pap Smear: 11/05/2018    Screening Not Indicated   Hepatitis C Screening Once for adults born between 1945 and 1965  More frequently in patients at high risk for Hepatitis C Hep C Antibody: Not on file    Screening Current   Diabetes Screening 1-2 times per year if you're at risk for diabetes or have pre-diabetes Fasting glucose: 99 mg/dL (7/7/2022)  A1C: 6 0 % (4/21/2018)  Screening Current   Cholesterol Screening Once every 5 years if you don't have a lipid disorder  May order more often based on risk factors  Lipid panel: 07/07/2022    Screening Not Indicated  History Lipid Disorder     Other Preventive Screenings Covered by Medicare:  1  Abdominal Aortic Aneurysm (AAA) Screening: covered once if your at risk  You're considered to be at risk if you have a family history of AAA  2  Lung Cancer Screening: covers low dose CT scan once per year if you meet all of the following conditions: (1) Age 50-69; (2) No signs or symptoms of lung cancer; (3) Current smoker or have quit smoking within the last 15 years; (4) You have a tobacco smoking history of at least 20 pack years (packs per day multiplied by number of years you smoked); (5) You get a written order from a healthcare provider  3  Glaucoma Screening: covered annually if you're considered high risk: (1) You have diabetes OR (2) Family history of glaucoma OR (3)  aged 48 and older OR (3)  American aged 72 and older  3  Osteoporosis Screening: covered every 2 years if you meet one of the following conditions: (1) You're estrogen deficient and at risk for osteoporosis based off medical history and other findings; (2) Have a vertebral abnormality; (3) On glucocorticoid therapy for more than 3 months; (4) Have primary hyperparathyroidism; (5) On osteoporosis medications and need to assess response to drug therapy  · Last bone density test (DXA Scan): 11/21/2018  5  HIV Screening: covered annually if you're between the age of 12-76  Also covered annually if you are younger than 13 and older than 72 with risk factors for HIV infection   For pregnant patients, it is covered up to 3 times per pregnancy  Immunizations:  Immunization Recommendations   Influenza Vaccine Annual influenza vaccination during flu season is recommended for all persons aged >= 6 months who do not have contraindications   Pneumococcal Vaccine   * Pneumococcal conjugate vaccine = PCV13 (Prevnar 13), PCV15 (Vaxneuvance), PCV20 (Prevnar 20)  * Pneumococcal polysaccharide vaccine = PPSV23 (Pneumovax) Adults 25-60 years old: 1-3 doses may be recommended based on certain risk factors  Adults 72 years old: 1-2 doses may be recommended based off what pneumonia vaccine you previously received   Hepatitis B Vaccine 3 dose series if at intermediate or high risk (ex: diabetes, end stage renal disease, liver disease)   Tetanus (Td) Vaccine - COST NOT COVERED BY MEDICARE PART B Following completion of primary series, a booster dose should be given every 10 years to maintain immunity against tetanus  Td may also be given as tetanus wound prophylaxis  Tdap Vaccine - COST NOT COVERED BY MEDICARE PART B Recommended at least once for all adults  For pregnant patients, recommended with each pregnancy  Shingles Vaccine (Shingrix) - COST NOT COVERED BY MEDICARE PART B  2 shot series recommended in those aged 48 and above     Health Maintenance Due:      Topic Date Due   • Colorectal Cancer Screening  03/31/2019   • Hepatitis C Screening  Completed     Immunizations Due:      Topic Date Due   • COVID-19 Vaccine (4 - Booster for Moderna series) 06/26/2021   • Influenza Vaccine (1) 09/01/2022   • Pneumococcal Vaccine: 65+ Years (2 - PPSV23 if available, else PCV20) 01/13/2023     Advance Directives   What are advance directives? Advance directives are legal documents that state your wishes and plans for medical care  These plans are made ahead of time in case you lose your ability to make decisions for yourself   Advance directives can apply to any medical decision, such as the treatments you want, and if you want to donate organs  What are the types of advance directives? There are many types of advance directives, and each state has rules about how to use them  You may choose a combination of any of the following:  · Living will: This is a written record of the treatment you want  You can also choose which treatments you do not want, which to limit, and which to stop at a certain time  This includes surgery, medicine, IV fluid, and tube feedings  · Durable power of  for healthcare [facility]): This is a written record that states who you want to make healthcare choices for you when you are unable to make them for yourself  This person, called a proxy, is usually a family member or a friend  You may choose more than 1 proxy  · Do not resuscitate (DNR) order:  A DNR order is used in case your heart stops beating or you stop breathing  It is a request not to have certain forms of treatment, such as CPR  A DNR order may be included in other types of advance directives  · Medical directive: This covers the care that you want if you are in a coma, near death, or unable to make decisions for yourself  You can list the treatments you want for each condition  Treatment may include pain medicine, surgery, blood transfusions, dialysis, IV or tube feedings, and a ventilator (breathing machine)  · Values history: This document has questions about your views, beliefs, and how you feel and think about life  This information can help others choose the care that you would choose  Why are advance directives important? An advance directive helps you control your care  Although spoken wishes may be used, it is better to have your wishes written down  Spoken wishes can be misunderstood, or not followed  Treatments may be given even if you do not want them  An advance directive may make it easier for your family to make difficult choices about your care         © Copyright [employer] 2018 Information is for End User's use only and may not be sold, redistributed or otherwise used for commercial purposes   All illustrations and images included in CareNotes® are the copyrighted property of A D A M , Inc  or [facility] [address]

## 2023-02-06 ENCOUNTER — APPOINTMENT (OUTPATIENT)
Dept: LAB | Facility: HOSPITAL | Age: 68
End: 2023-02-06

## 2023-02-06 DIAGNOSIS — E55.9 VITAMIN D DEFICIENCY: ICD-10-CM

## 2023-02-06 DIAGNOSIS — E78.5 HYPERLIPIDEMIA, UNSPECIFIED HYPERLIPIDEMIA TYPE: ICD-10-CM

## 2023-02-06 DIAGNOSIS — N18.30 STAGE 3 CHRONIC KIDNEY DISEASE, UNSPECIFIED WHETHER STAGE 3A OR 3B CKD (HCC): ICD-10-CM

## 2023-02-06 DIAGNOSIS — E66.01 MORBID OBESITY WITH BMI OF 45.0-49.9, ADULT (HCC): ICD-10-CM

## 2023-02-06 DIAGNOSIS — R73.01 IMPAIRED FASTING BLOOD SUGAR: ICD-10-CM

## 2023-02-06 LAB
25(OH)D3 SERPL-MCNC: 15.7 NG/ML (ref 30–100)
ALBUMIN SERPL BCP-MCNC: 4.1 G/DL (ref 3.5–5)
ALP SERPL-CCNC: 89 U/L (ref 43–122)
ALT SERPL W P-5'-P-CCNC: 52 U/L
ANION GAP SERPL CALCULATED.3IONS-SCNC: 8 MMOL/L (ref 5–14)
AST SERPL W P-5'-P-CCNC: 41 U/L (ref 14–36)
BASOPHILS # BLD AUTO: 0.02 THOUSANDS/ÂΜL (ref 0–0.1)
BASOPHILS NFR BLD AUTO: 0 % (ref 0–1)
BILIRUB SERPL-MCNC: 0.68 MG/DL (ref 0.2–1)
BUN SERPL-MCNC: 18 MG/DL (ref 5–25)
CALCIUM SERPL-MCNC: 9.5 MG/DL (ref 8.4–10.2)
CHLORIDE SERPL-SCNC: 108 MMOL/L (ref 96–108)
CHOLEST SERPL-MCNC: 257 MG/DL
CO2 SERPL-SCNC: 29 MMOL/L (ref 21–32)
CREAT SERPL-MCNC: 1.05 MG/DL (ref 0.6–1.2)
EOSINOPHIL # BLD AUTO: 0.13 THOUSAND/ÂΜL (ref 0–0.61)
EOSINOPHIL NFR BLD AUTO: 3 % (ref 0–6)
ERYTHROCYTE [DISTWIDTH] IN BLOOD BY AUTOMATED COUNT: 13.9 % (ref 11.6–15.1)
EST. AVERAGE GLUCOSE BLD GHB EST-MCNC: 114 MG/DL
GFR SERPL CREATININE-BSD FRML MDRD: 55 ML/MIN/1.73SQ M
GLUCOSE P FAST SERPL-MCNC: 88 MG/DL (ref 70–99)
HBA1C MFR BLD: 5.6 %
HCT VFR BLD AUTO: 41.5 % (ref 34.8–46.1)
HDLC SERPL-MCNC: 53 MG/DL
HGB BLD-MCNC: 13.6 G/DL (ref 11.5–15.4)
IMM GRANULOCYTES # BLD AUTO: 0.02 THOUSAND/UL (ref 0–0.2)
IMM GRANULOCYTES NFR BLD AUTO: 0 % (ref 0–2)
LDLC SERPL CALC-MCNC: 162 MG/DL
LYMPHOCYTES # BLD AUTO: 1.25 THOUSANDS/ÂΜL (ref 0.6–4.47)
LYMPHOCYTES NFR BLD AUTO: 25 % (ref 14–44)
MCH RBC QN AUTO: 29.8 PG (ref 26.8–34.3)
MCHC RBC AUTO-ENTMCNC: 32.8 G/DL (ref 31.4–37.4)
MCV RBC AUTO: 91 FL (ref 82–98)
MONOCYTES # BLD AUTO: 0.3 THOUSAND/ÂΜL (ref 0.17–1.22)
MONOCYTES NFR BLD AUTO: 6 % (ref 4–12)
NEUTROPHILS # BLD AUTO: 3.2 THOUSANDS/ÂΜL (ref 1.85–7.62)
NEUTS SEG NFR BLD AUTO: 66 % (ref 43–75)
NONHDLC SERPL-MCNC: 204 MG/DL
NRBC BLD AUTO-RTO: 0 /100 WBCS
PLATELET # BLD AUTO: 180 THOUSANDS/UL (ref 149–390)
PMV BLD AUTO: 11.5 FL (ref 8.9–12.7)
POTASSIUM SERPL-SCNC: 4.4 MMOL/L (ref 3.5–5.3)
PROT SERPL-MCNC: 8.3 G/DL (ref 6.4–8.4)
RBC # BLD AUTO: 4.56 MILLION/UL (ref 3.81–5.12)
SODIUM SERPL-SCNC: 145 MMOL/L (ref 135–147)
TRIGL SERPL-MCNC: 211 MG/DL
TSH SERPL DL<=0.05 MIU/L-ACNC: 2.21 UIU/ML (ref 0.45–4.5)
WBC # BLD AUTO: 4.92 THOUSAND/UL (ref 4.31–10.16)

## 2023-02-07 ENCOUNTER — TELEPHONE (OUTPATIENT)
Dept: FAMILY MEDICINE CLINIC | Facility: CLINIC | Age: 68
End: 2023-02-07

## 2023-02-07 NOTE — TELEPHONE ENCOUNTER
----- Message from Cyndy Steiner, [address] sent at 2/7/2023  9:18 AM EST -----  Please call patient and schedule to discuss lab results

## 2023-02-14 ENCOUNTER — OFFICE VISIT (OUTPATIENT)
Dept: FAMILY MEDICINE CLINIC | Facility: CLINIC | Age: 68
End: 2023-02-14

## 2023-02-14 VITALS
SYSTOLIC BLOOD PRESSURE: 138 MMHG | RESPIRATION RATE: 16 BRPM | BODY MASS INDEX: 45.6 KG/M2 | OXYGEN SATURATION: 97 % | DIASTOLIC BLOOD PRESSURE: 88 MMHG | WEIGHT: 199.8 LBS | TEMPERATURE: 97.3 F | HEART RATE: 88 BPM

## 2023-02-14 DIAGNOSIS — E78.2 MIXED HYPERLIPIDEMIA: Primary | ICD-10-CM

## 2023-02-14 DIAGNOSIS — E78.2 MIXED HYPERLIPIDEMIA: ICD-10-CM

## 2023-02-14 DIAGNOSIS — E55.9 VITAMIN D DEFICIENCY: ICD-10-CM

## 2023-02-14 RX ORDER — PRAVASTATIN SODIUM 10 MG
10 TABLET ORAL
Qty: 30 TABLET | Refills: 5 | Status: SHIPPED | OUTPATIENT
Start: 2023-02-14 | End: 2023-02-15

## 2023-02-14 RX ORDER — MELATONIN
4000 DAILY
Qty: 120 TABLET | Refills: 12 | Status: SHIPPED | OUTPATIENT
Start: 2023-02-14

## 2023-02-14 NOTE — PROGRESS NOTES
Subjective:   Chief Complaint   Patient presents with   • Follow-up     Lab discussion         Patient ID: Humberto Cummins is a 78 y o  female  Presents today for discussion on recent lab work  Discussed CBC normal, CMP does show slightly elevated liver enzymes with AST at 41 and ALT at 52  Discussed this elevation is most likely related to her weight  Did discuss increased exercise as well as diet  Discussed lipid panel showing elevated total cholesterol at 257, triglycerides at 211 and LDL cholesterol at 162  Discussed that her ASCVD risk currently is 8 2 almost double half the average risk  Discussed the importance of adding a statin medication to help control her cholesterol  Again reviewed heart healthy diet as well as exercise  Discussed rechecking cholesterol in 3 months for resolution  Vitamin D level is low at 15 7 will send vitamin D as well  All questions were asked and answered    More than half of this 20 minute visit spent counseling and coordinating care  The following portions of the patient's history were reviewed and updated as appropriate: allergies, current medications, past family history, past medical history, past social history, past surgical history and problem list     Review of Systems          Objective:  Vitals:    02/14/23 1058   BP: 138/88   BP Location: Left arm   Patient Position: Sitting   Cuff Size: Adult   Pulse: 88   Resp: 16   Temp: (!) 97 3 °F (36 3 °C)   SpO2: 97%   Weight: 90 6 kg (199 lb 12 8 oz)      Physical Exam      Assessment/Plan:    No problem-specific Assessment & Plan notes found for this encounter  Diagnoses and all orders for this visit:    Mixed hyperlipidemia  -     pravastatin (PRAVACHOL) 10 mg tablet; Take 1 tablet (10 mg total) by mouth daily at bedtime  -     Lipid panel; Future    Vitamin D deficiency  -     cholecalciferol (VITAMIN D3) 1,000 units tablet;  Take 4 tablets (4,000 Units total) by mouth daily

## 2023-02-15 RX ORDER — PRAVASTATIN SODIUM 10 MG
TABLET ORAL
Qty: 90 TABLET | Refills: 0 | Status: SHIPPED | OUTPATIENT
Start: 2023-02-15

## 2023-05-12 ENCOUNTER — TELEPHONE (OUTPATIENT)
Dept: FAMILY MEDICINE CLINIC | Facility: CLINIC | Age: 68
End: 2023-05-12

## 2023-06-15 DIAGNOSIS — E78.2 MIXED HYPERLIPIDEMIA: ICD-10-CM

## 2023-06-16 RX ORDER — PRAVASTATIN SODIUM 10 MG
TABLET ORAL
Qty: 90 TABLET | Refills: 0 | Status: SHIPPED | OUTPATIENT
Start: 2023-06-16

## 2023-06-19 ENCOUNTER — APPOINTMENT (OUTPATIENT)
Dept: LAB | Facility: HOSPITAL | Age: 68
End: 2023-06-19
Payer: COMMERCIAL

## 2023-06-19 DIAGNOSIS — E78.2 MIXED HYPERLIPIDEMIA: ICD-10-CM

## 2023-06-19 LAB
CHOLEST SERPL-MCNC: 170 MG/DL
HDLC SERPL-MCNC: 58 MG/DL
LDLC SERPL CALC-MCNC: 76 MG/DL (ref 0–100)
NONHDLC SERPL-MCNC: 112 MG/DL
TRIGL SERPL-MCNC: 180 MG/DL

## 2023-06-19 PROCEDURE — 36415 COLL VENOUS BLD VENIPUNCTURE: CPT

## 2023-06-19 PROCEDURE — 80061 LIPID PANEL: CPT

## 2023-07-18 ENCOUNTER — TELEPHONE (OUTPATIENT)
Dept: NEPHROLOGY | Facility: CLINIC | Age: 68
End: 2023-07-18

## 2023-07-18 ENCOUNTER — OFFICE VISIT (OUTPATIENT)
Dept: FAMILY MEDICINE CLINIC | Facility: CLINIC | Age: 68
End: 2023-07-18
Payer: COMMERCIAL

## 2023-07-18 VITALS
HEART RATE: 64 BPM | HEIGHT: 55 IN | TEMPERATURE: 97.8 F | DIASTOLIC BLOOD PRESSURE: 82 MMHG | SYSTOLIC BLOOD PRESSURE: 120 MMHG | WEIGHT: 197.2 LBS | BODY MASS INDEX: 45.64 KG/M2 | OXYGEN SATURATION: 98 %

## 2023-07-18 DIAGNOSIS — N18.30 STAGE 3 CHRONIC KIDNEY DISEASE, UNSPECIFIED WHETHER STAGE 3A OR 3B CKD (HCC): Primary | ICD-10-CM

## 2023-07-18 DIAGNOSIS — Z87.898 HISTORY OF PREDIABETES: ICD-10-CM

## 2023-07-18 DIAGNOSIS — E78.2 MIXED HYPERLIPIDEMIA: ICD-10-CM

## 2023-07-18 PROBLEM — I70.209 ATHEROSCLEROSIS OF ARTERIES OF EXTREMITIES (HCC): Status: ACTIVE | Noted: 2023-07-18

## 2023-07-18 PROCEDURE — 99214 OFFICE O/P EST MOD 30 MIN: CPT | Performed by: NURSE PRACTITIONER

## 2023-07-18 NOTE — PROGRESS NOTES
Subjective:   Chief Complaint   Patient presents with   • Follow-up     Dizziness at night        Patient ID: José Antonio Flores is a 78 y.o. female. Presents today for follow up on chronic conditions. Discussed with patient she has CKD stage 3A. Advise it would be important to have a nephrology consult to make sure we are doing everything possible to preserve kidney function. She is currently taking Pravastatin 10 mg daily with last lipid profile at goal except for her triglycerides which have improved from 211 to 180. She has a history of prediabetes, however last A1c was normal at 5.6. The following portions of the patient's history were reviewed and updated as appropriate: allergies, current medications, past family history, past medical history, past social history, past surgical history and problem list.    Review of Systems   Constitutional: Negative for chills, fatigue and fever. Respiratory: Negative for cough and shortness of breath. Cardiovascular: Negative for palpitations and leg swelling. Psychiatric/Behavioral: Negative for dysphoric mood. The patient is not nervous/anxious. Objective:  Vitals:    07/18/23 1000   BP: 120/82   BP Location: Left arm   Patient Position: Sitting   Cuff Size: Adult   Pulse: 64   Temp: 97.8 °F (36.6 °C)   TempSrc: Temporal   SpO2: 98%   Weight: 89.4 kg (197 lb 3.2 oz)   Height: 4' 7" (1.397 m)      Physical Exam  Vitals reviewed. Constitutional:       Appearance: Normal appearance. She is obese. Neck:      Vascular: No carotid bruit. Cardiovascular:      Rate and Rhythm: Normal rate and regular rhythm. Pulses: Normal pulses. Heart sounds: Normal heart sounds. Pulmonary:      Effort: Pulmonary effort is normal.      Breath sounds: Normal breath sounds. Musculoskeletal:      Cervical back: Normal range of motion and neck supple. Lymphadenopathy:      Cervical: No cervical adenopathy.    Skin:     General: Skin is warm and dry.      Capillary Refill: Capillary refill takes less than 2 seconds. Neurological:      Mental Status: She is alert and oriented to person, place, and time. Psychiatric:         Mood and Affect: Mood normal.         Behavior: Behavior normal.           Assessment/Plan:    No problem-specific Assessment & Plan notes found for this encounter. Diagnoses and all orders for this visit:    Stage 3 chronic kidney disease, unspecified whether stage 3a or 3b CKD ([address])  -     Ambulatory Referral to Nephrology; Future  -     Comprehensive metabolic panel; Future    Mixed hyperlipidemia  -     Lipid panel;  Future    History of prediabetes  -     Hemoglobin A1C; Future

## 2023-07-19 ENCOUNTER — TELEPHONE (OUTPATIENT)
Dept: NEPHROLOGY | Facility: CLINIC | Age: 68
End: 2023-07-19

## 2023-07-19 NOTE — TELEPHONE ENCOUNTER
New Patient Intake Form   Patient Details   Flori Carrasco     1955     [id number]     Insurance Information   Name of [facility] REP   Does the patient need an insurance referral? NO   If patient has Pitney Anna, please ask if they will be using their Pitney Anna. Appointment Information   Who is calling to schedule? If not patient, what is callers name? Pt    Referring Provider Duanne Cockayne, [facility]   Reason for Appt (Diagnosis) N18.30 (ICD-10-CM) - Stage 3 chronic kidney disease, unspecified whether stage 3a or 3b CKD ([address] )   Does Patient have labs/urine done at [facility]? If not, where do they go? List the date of last lab / urine  *Please try to get labs 2 years back if not at  yes    Has patient been hospitalized recently? If yes, list name and location of hospital they were In no   Has patient been seen by a Nephrologist before? If yes, list name, location and phone number no   Has the patient had renal imaging done? If so, list the most recent date and type of imagineg no   Does patient have a history of Kidney Stones? Yes    Appointment Details   Is there a referral on file?  yes   Appointment Date 11/27/23   Location [facility]    Miscellaneous  Added to wait list.

## 2023-09-11 DIAGNOSIS — E78.2 MIXED HYPERLIPIDEMIA: ICD-10-CM

## 2023-09-12 RX ORDER — PRAVASTATIN SODIUM 10 MG
TABLET ORAL
Qty: 90 TABLET | Refills: 0 | Status: SHIPPED | OUTPATIENT
Start: 2023-09-12

## 2023-09-25 DIAGNOSIS — R32 URINARY INCONTINENCE, UNSPECIFIED TYPE: ICD-10-CM

## 2023-09-25 DIAGNOSIS — E78.2 MIXED HYPERLIPIDEMIA: ICD-10-CM

## 2023-09-25 RX ORDER — PRAVASTATIN SODIUM 10 MG
TABLET ORAL
Qty: 90 TABLET | Refills: 0 | Status: SHIPPED | OUTPATIENT
Start: 2023-09-25

## 2023-09-25 RX ORDER — OXYBUTYNIN CHLORIDE 5 MG/1
TABLET ORAL
Qty: 180 TABLET | Refills: 1 | Status: SHIPPED | OUTPATIENT
Start: 2023-09-25

## 2023-10-08 DIAGNOSIS — K21.9 GASTROESOPHAGEAL REFLUX DISEASE WITHOUT ESOPHAGITIS: ICD-10-CM

## 2023-10-09 RX ORDER — PANTOPRAZOLE SODIUM 40 MG/1
40 TABLET, DELAYED RELEASE ORAL DAILY
Qty: 90 TABLET | Refills: 3 | Status: SHIPPED | OUTPATIENT
Start: 2023-10-09

## 2023-10-25 ENCOUNTER — OFFICE VISIT (OUTPATIENT)
Dept: FAMILY MEDICINE CLINIC | Facility: CLINIC | Age: 68
End: 2023-10-25
Payer: COMMERCIAL

## 2023-10-25 VITALS
SYSTOLIC BLOOD PRESSURE: 156 MMHG | HEART RATE: 91 BPM | WEIGHT: 196.6 LBS | DIASTOLIC BLOOD PRESSURE: 82 MMHG | BODY MASS INDEX: 45.5 KG/M2 | TEMPERATURE: 98.3 F | HEIGHT: 55 IN | OXYGEN SATURATION: 100 %

## 2023-10-25 DIAGNOSIS — I10 PRIMARY HYPERTENSION: Primary | ICD-10-CM

## 2023-10-25 DIAGNOSIS — R06.00 DYSPNEA, UNSPECIFIED TYPE: ICD-10-CM

## 2023-10-25 DIAGNOSIS — Z87.891 FORMER SMOKER: ICD-10-CM

## 2023-10-25 PROCEDURE — 99214 OFFICE O/P EST MOD 30 MIN: CPT

## 2023-10-25 RX ORDER — LISINOPRIL 5 MG/1
5 TABLET ORAL DAILY
Qty: 30 TABLET | Refills: 0 | Status: SHIPPED | OUTPATIENT
Start: 2023-10-25

## 2023-10-25 RX ORDER — FLUTICASONE PROPIONATE AND SALMETEROL 100; 50 UG/1; UG/1
1 POWDER RESPIRATORY (INHALATION) 2 TIMES DAILY
Qty: 60 BLISTER | Refills: 5 | Status: SHIPPED | OUTPATIENT
Start: 2023-10-25 | End: 2024-04-22

## 2023-10-25 NOTE — ASSESSMENT & PLAN NOTE
Today's /98 repeat 156/82   Current medication: none  New medication: Lisinopril 5mg     Advised patient on symptoms of hypotension & severe HTN  Limit salt-intake & caffeine.  DASH diet discussed, minimize stress level  Weight reduction efforts via improved diet & increased exercise  Patient is to follow up in 4 weeks for blood pressure recheck

## 2023-10-25 NOTE — PATIENT INSTRUCTIONS
Sleep Apnea   AMBULATORY CARE:   Sleep apnea  is a condition that causes you to stop breathing often during sleep. Types of sleep apnea:   Obstructive sleep apnea (JOEY)  is the most common kind. The muscles and tissues around your throat relax and block air from passing through. Obesity, use of alcohol or cigarettes, or a family history are common causes. JOEY may increase your risk for complications after surgery. Central sleep apnea (CSA)  means your brain does not send signals to the muscles that control breathing. You do not take a breath even though your airway is open. Common causes include medical conditions such as heart failure, being older than 40, or use of opioids. Complex (or mixed) sleep apnea  means you have both obstructive and central sleep apnea. Common signs and symptoms:   Loud snoring or long pauses in breathing    Feeling sleepy, slow, and tired during the day    Snorting, gasping, or choking while you sleep, and waking up suddenly because of these    Feeling irritable during the day    Dry mouth or a headache in the mornings    Heavy night sweating    A hard time thinking, remembering things, or focusing on your tasks the following day    Call your local emergency number (911 in the [address]) if:   You have chest pain or trouble breathing. Call your doctor if:   You have new or worsening signs or symptoms. You have questions or concerns about your condition or care. Treatment  depends on the kind of apnea you have. A mouth device  may be needed if you have mild sleep apnea. These are designed to keep your throat open. Ask your dentist or healthcare provider about the best mouth device for you. A machine  may be used to help you get more air during sleep. A mask may be placed over your nose and mouth, or just your nose. The mask is hooked to the machine. You will get air through the mask.     A continuous positive airway pressure (CPAP) machine  is used to keep your airway open during sleep. The machine blows a gentle stream of air into the mask when you breathe. This helps keep your airway open so you can breathe more regularly. Extra oxygen may be given through the machine. A bilevel positive airway pressure (BiPAP) machine  gives air but lowers the pressure when you breathe out. An adaptive servo-ventilator (ASV)  is a machine that learns your usual breathing pattern. Then, it uses pressure to give you air and prevent stops in your breathing. Surgery  to expand your airway or remove extra tissues may be needed. Surgery is usually only considered if other treatments do not work. Manage or prevent sleep apnea:   Reach and maintain a healthy weight. Ask your healthcare provider what a healthy weight is for you. Ask your provider to help you create a safe weight loss plan if you are overweight. Even a small goal of a 10% weight loss can improve your symptoms. Do not smoke. Nicotine and other chemicals in cigarettes and cigars can cause lung damage. Ask your healthcare provider for information if you currently smoke and need help to quit. E-cigarettes or smokeless tobacco still contain nicotine. Talk to your healthcare provider before you use these products. Do not drink alcohol or take sedative medicine before you go to sleep. Alcohol and sedatives can relax the muscles and tissues around your throat. This can block the airflow to your lungs. Sleep on your side or use pillows designed to prevent sleep apnea. This prevents your tongue or other tissues from blocking your throat. You can also raise the head of your bed. Follow up with your doctor or specialist as directed: You may need to have blood tests during your follow-up visits. Work with your provider to find the right breathing support equipment and settings for you. Write down your questions so you remember to ask them during your visits.   © Copyright [employer] 2023 Information is for End User's use only and may not be sold, redistributed or otherwise used for commercial purposes. The above information is an  only. It is not intended as medical advice for individual conditions or treatments. Talk to your doctor, nurse or pharmacist before following any medical regimen to see if it is safe and effective for you. Dyspnea   WHAT YOU NEED TO KNOW:   Dyspnea is breathing difficulty or discomfort. You may have labored, painful, or shallow breathing. You may feel breathless or short of breath. Dyspnea can occur during rest or with activity. You may have dyspnea for a short time, or it might become chronic. Dyspnea is often a symptom of a disease or condition. DISCHARGE INSTRUCTIONS:   Return to the emergency department if:   Your signs and symptoms are the same or worse within 24 hours of treatment. You have shaking chills or a fever over 102°F.     You have new pain, pressure, or tightness in your chest.     You have a new or worse cough or wheezing, or you cough up blood. You feel like you cannot get enough air. The skin over your ribs or on your neck sinks in when you breathe. You have a severe headache with vomiting and abdominal pain. You feel confused or dizzy. Call your doctor or specialist if:   You have questions or concerns about your condition or care. Medicines:   Medicines  may be used to treat the cause of your dyspnea. Medicines may reduce swelling in your airway or decrease extra fluid from around your heart or lungs. Other medicines may be used to decrease anxiety and help you feel calm and relaxed. Take your medicine as directed. Contact your healthcare provider if you think your medicine is not helping or if you have side effects. Tell your provider if you are allergic to any medicine. Keep a list of the medicines, vitamins, and herbs you take. Include the amounts, and when and why you take them.  Bring the list or the pill bottles to follow-up visits. Carry your medicine list with you in case of an emergency. Manage long-term dyspnea:   Create an action plan. You and your healthcare provider can work together to create a plan for how to handle episodes of dyspnea. The plan can include daily activities, treatment changes, and what to do if you have severe breathing problems. Lean forward on your elbows when you sit. This helps your lungs expand and may make it easier to breathe. Use pursed-lip breathing any time you feel short of breath. Breathe in through your nose and then slowly breathe out through your mouth with your lips slightly puckered. It should take you twice as long to breathe out as it did to breathe in. Do not smoke. Nicotine and other chemicals in cigarettes and cigars can cause lung damage and make it harder to breathe. Ask your healthcare provider for information if you currently smoke and need help to quit. E-cigarettes or smokeless tobacco still contain nicotine. Talk to your healthcare provider before you use these products. Reach or maintain a healthy weight. Your healthcare provider can help you create a safe weight loss plan if you are overweight. Exercise as directed. Exercise can help your lungs work more easily. Exercise can also help you lose weight if needed. Try to get at least 30 minutes of exercise most days of the week. Your healthcare provider can help you create an exercise plan that is safe for you. Follow up with your doctor or specialist as directed:  Write down your questions so you remember to ask them during your visits. © Copyright [employer] 2023 Information is for End User's use only and may not be sold, redistributed or otherwise used for commercial purposes. The above information is an  only. It is not intended as medical advice for individual conditions or treatments.  Talk to your doctor, nurse or pharmacist before following any medical regimen to see if it is safe and effective for you.

## 2023-10-25 NOTE — ASSESSMENT & PLAN NOTE
Shortness of breath has increased in intensity over the past few weeks  Current medication: Albuterol   New medication: Albuterol and Fluticisone-Salmeterol   Rinse mouth out after use   Shortness of breath can also be related to high BMI and low exertional threshold    Patient also exhibits signs of sleep apnea including   Waking up unrefreshed, snoring and shortness of breath while lying supine   Referral to sleep medicine for evaluation of sleep apnea

## 2023-10-25 NOTE — ASSESSMENT & PLAN NOTE
Interested in weight loss medications  Declines referral to weight management related to limited access to transportation  Discussed options   Patient is to keep a food and exercise log for 30 days including all meals and 150 minutes a week of moderate exercise. If patient returns with diet and exercise records can consider medications   Side effects of medications and extended wait times discussed at length.

## 2023-10-25 NOTE — PROGRESS NOTES
Name: Tarah Rivera      : 1955      MRN: [id number]  Encounter Provider: BRIE Caro  Encounter Date: 10/25/2023   Encounter department: [facility]     1. Primary hypertension  Assessment & Plan: Today's /98 repeat 156/82   Current medication: none  New medication: Lisinopril 5mg     Advised patient on symptoms of hypotension & severe HTN  Limit salt-intake & caffeine. DASH diet discussed, minimize stress level  Weight reduction efforts via improved diet & increased exercise  Patient is to follow up in 4 weeks for blood pressure recheck     Orders:  -     lisinopril (ZESTRIL) 5 mg tablet; Take 1 tablet (5 mg total) by mouth daily    2. Former smoker  Assessment & Plan:  Former 1 PPD smoker   Quit smoking 8 years ago   Still exposed to second hand smoke. 3. Dyspnea, unspecified type  Assessment & Plan:  Shortness of breath has increased in intensity over the past few weeks  Current medication: Albuterol   New medication: Albuterol and Fluticisone-Salmeterol   Rinse mouth out after use   Shortness of breath can also be related to high BMI and low exertional threshold    Patient also exhibits signs of sleep apnea including   Waking up unrefreshed, snoring and shortness of breath while lying supine   Referral to sleep medicine for evaluation of sleep apnea     Orders:  -     Ambulatory Referral to Sleep Medicine; Future  -     Fluticasone-Salmeterol (Advair) 100-50 mcg/dose inhaler; Inhale 1 puff 2 (two) times a day Rinse mouth after use. 4. BMI 45.0-49.9, adult [facility])  Assessment & Plan:  Interested in weight loss medications  Declines referral to weight management related to limited access to transportation  Discussed options   Patient is to keep a food and exercise log for 30 days including all meals and 150 minutes a week of moderate exercise.    If patient returns with diet and exercise records can consider medications   Side effects of medications and extended wait times discussed at length. Subjective      Shortness of Breath  The current episode started 1 to 4 weeks ago. The problem occurs daily. The problem has been gradually worsening since onset. The problem is mild. Associated symptoms include chest pressure, dizziness (when lying on the right side) and palpitations (on exertion). Pertinent negatives include no chest pain, coughing, fatigue, hoarseness of voice, leg swelling, orthopnea, rhinorrhea, sore throat, stridor, sweats or wheezing. The symptoms are aggravated by a supine position. There was no intake of a foreign body. She has had no prior steroid use. Past treatments include beta-agonist inhalers. The treatment provided mild relief. Her past medical history is significant for tobacco use. There is no history of allergies, anxiety/panic attacks, asthma, bronchiolitis, congenital heart disease, DVT, GERD, PE or spontaneous pneumothorax. Review of Systems   Constitutional:  Negative for activity change, chills, fatigue and fever. HENT:  Negative for congestion, ear pain, hoarse voice, rhinorrhea, sore throat and trouble swallowing. Eyes:  Negative for pain and visual disturbance. Respiratory:  Positive for shortness of breath. Negative for cough, chest tightness, wheezing and stridor. Cardiovascular:  Positive for palpitations (on exertion). Negative for chest pain, orthopnea and leg swelling. Gastrointestinal:  Negative for abdominal pain, constipation, diarrhea, nausea and vomiting. Genitourinary:  Negative for difficulty urinating, dysuria, hematuria and urgency. Musculoskeletal:  Negative for arthralgias and back pain. Skin:  Negative for color change and rash. Neurological:  Positive for dizziness (when lying on the right side). Negative for seizures, syncope and headaches. Psychiatric/Behavioral:  Negative for dysphoric mood. The patient is not nervous/anxious.     All other systems reviewed and are negative. Current Outpatient Medications on File Prior to Visit   Medication Sig    albuterol (PROVENTIL HFA,VENTOLIN HFA) 90 mcg/act inhaler INHALE 1 PUFF BY MOUTH EVERY 6 HOURS    aspirin (ECOTRIN) 325 mg EC tablet Take 1 tablet (325 mg total) by mouth daily    cholecalciferol (VITAMIN D3) 1,000 units tablet Take 4 tablets (4,000 Units total) by mouth daily    clindamycin (CLEOCIN T) 1 % lotion Apply topically 2 (two) times a day    oxybutynin (DITROPAN) 5 mg tablet TAKE 1 TABLET(5 MG) BY MOUTH TWICE DAILY    pantoprazole (PROTONIX) 40 mg tablet TAKE 1 TABLET(40 MG) BY MOUTH DAILY    pravastatin (PRAVACHOL) 10 mg tablet TAKE 1 TABLET(10 MG) BY MOUTH DAILY AT BEDTIME    [DISCONTINUED] clindamycin (CLEOCIN) 300 MG capsule TAKE 1 CAPSULE BY MOUTH TWICE DAILY FOR 5 DAYS    [DISCONTINUED] pantoprazole (PROTONIX) 40 mg tablet TAKE 1 TABLET(40 MG) BY MOUTH DAILY       Objective     /82 (BP Location: Right arm, Patient Position: Sitting, Cuff Size: Large)   Pulse 91   Temp 98.3 °F (36.8 °C) (Temporal)   Ht 4' 7" (1.397 m)   Wt 89.2 kg (196 lb 9.6 oz)   SpO2 100%   BMI 45.69 kg/m²     Physical Exam  Vitals and nursing note reviewed. Constitutional:       Appearance: Normal appearance. She is normal weight. HENT:      Head: Normocephalic. Right Ear: Tympanic membrane, ear canal and external ear normal. There is no impacted cerumen. Left Ear: Tympanic membrane, ear canal and external ear normal. There is no impacted cerumen. Nose: Nose normal.      Mouth/Throat:      Mouth: Mucous membranes are moist.      Pharynx: Oropharynx is clear. Eyes:      Extraocular Movements: Extraocular movements intact. Conjunctiva/sclera: Conjunctivae normal.      Pupils: Pupils are equal, round, and reactive to light. Cardiovascular:      Rate and Rhythm: Normal rate and regular rhythm. Pulses: Normal pulses. Heart sounds: Normal heart sounds.    Pulmonary: Effort: Pulmonary effort is normal.      Breath sounds: Normal breath sounds. Abdominal:      General: Bowel sounds are normal. There is no distension. Palpations: Abdomen is soft. Tenderness: There is no abdominal tenderness. Musculoskeletal:         General: Normal range of motion. Cervical back: Normal range of motion. Skin:     General: Skin is warm. Capillary Refill: Capillary refill takes less than 2 seconds. Neurological:      General: No focal deficit present. Mental Status: She is alert and oriented to person, place, and time. Mental status is at baseline. Psychiatric:         Mood and Affect: Mood normal.         Behavior: Behavior normal.         Thought Content: Thought content normal.         Judgment: Judgment normal.       Patient Instructions   Sleep Apnea   AMBULATORY CARE:   Sleep apnea  is a condition that causes you to stop breathing often during sleep. Types of sleep apnea:   Obstructive sleep apnea (JOEY)  is the most common kind. The muscles and tissues around your throat relax and block air from passing through. Obesity, use of alcohol or cigarettes, or a family history are common causes. JOEY may increase your risk for complications after surgery. Central sleep apnea (CSA)  means your brain does not send signals to the muscles that control breathing. You do not take a breath even though your airway is open. Common causes include medical conditions such as heart failure, being older than 40, or use of opioids. Complex (or mixed) sleep apnea  means you have both obstructive and central sleep apnea.     Common signs and symptoms:   Loud snoring or long pauses in breathing    Feeling sleepy, slow, and tired during the day    Snorting, gasping, or choking while you sleep, and waking up suddenly because of these    Feeling irritable during the day    Dry mouth or a headache in the mornings    Heavy night sweating    A hard time thinking, remembering things, or focusing on your tasks the following day    Call your local emergency number (911 in the [address]) if:   You have chest pain or trouble breathing. Call your doctor if:   You have new or worsening signs or symptoms. You have questions or concerns about your condition or care. Treatment  depends on the kind of apnea you have. A mouth device  may be needed if you have mild sleep apnea. These are designed to keep your throat open. Ask your dentist or healthcare provider about the best mouth device for you. A machine  may be used to help you get more air during sleep. A mask may be placed over your nose and mouth, or just your nose. The mask is hooked to the machine. You will get air through the mask. A continuous positive airway pressure (CPAP) machine  is used to keep your airway open during sleep. The machine blows a gentle stream of air into the mask when you breathe. This helps keep your airway open so you can breathe more regularly. Extra oxygen may be given through the machine. A bilevel positive airway pressure (BiPAP) machine  gives air but lowers the pressure when you breathe out. An adaptive servo-ventilator (ASV)  is a machine that learns your usual breathing pattern. Then, it uses pressure to give you air and prevent stops in your breathing. Surgery  to expand your airway or remove extra tissues may be needed. Surgery is usually only considered if other treatments do not work. Manage or prevent sleep apnea:   Reach and maintain a healthy weight. Ask your healthcare provider what a healthy weight is for you. Ask your provider to help you create a safe weight loss plan if you are overweight. Even a small goal of a 10% weight loss can improve your symptoms. Do not smoke. Nicotine and other chemicals in cigarettes and cigars can cause lung damage. Ask your healthcare provider for information if you currently smoke and need help to quit.  E-cigarettes or smokeless tobacco still contain nicotine. Talk to your healthcare provider before you use these products. Do not drink alcohol or take sedative medicine before you go to sleep. Alcohol and sedatives can relax the muscles and tissues around your throat. This can block the airflow to your lungs. Sleep on your side or use pillows designed to prevent sleep apnea. This prevents your tongue or other tissues from blocking your throat. You can also raise the head of your bed. Follow up with your doctor or specialist as directed: You may need to have blood tests during your follow-up visits. Work with your provider to find the right breathing support equipment and settings for you. Write down your questions so you remember to ask them during your visits. © Copyright [facility] 2023 Information is for End User's use only and may not be sold, redistributed or otherwise used for commercial purposes. The above information is an  only. It is not intended as medical advice for individual conditions or treatments. Talk to your doctor, nurse or pharmacist before following any medical regimen to see if it is safe and effective for you. Dyspnea   WHAT YOU NEED TO KNOW:   Dyspnea is breathing difficulty or discomfort. You may have labored, painful, or shallow breathing. You may feel breathless or short of breath. Dyspnea can occur during rest or with activity. You may have dyspnea for a short time, or it might become chronic. Dyspnea is often a symptom of a disease or condition. DISCHARGE INSTRUCTIONS:   Return to the emergency department if:   Your signs and symptoms are the same or worse within 24 hours of treatment. You have shaking chills or a fever over 102°F.     You have new pain, pressure, or tightness in your chest.     You have a new or worse cough or wheezing, or you cough up blood. You feel like you cannot get enough air. The skin over your ribs or on your neck sinks in when you breathe.      You have a severe headache with vomiting and abdominal pain. You feel confused or dizzy. Call your doctor or specialist if:   You have questions or concerns about your condition or care. Medicines:   Medicines  may be used to treat the cause of your dyspnea. Medicines may reduce swelling in your airway or decrease extra fluid from around your heart or lungs. Other medicines may be used to decrease anxiety and help you feel calm and relaxed. Take your medicine as directed. Contact your healthcare provider if you think your medicine is not helping or if you have side effects. Tell your provider if you are allergic to any medicine. Keep a list of the medicines, vitamins, and herbs you take. Include the amounts, and when and why you take them. Bring the list or the pill bottles to follow-up visits. Carry your medicine list with you in case of an emergency. Manage long-term dyspnea:   Create an action plan. You and your healthcare provider can work together to create a plan for how to handle episodes of dyspnea. The plan can include daily activities, treatment changes, and what to do if you have severe breathing problems. Lean forward on your elbows when you sit. This helps your lungs expand and may make it easier to breathe. Use pursed-lip breathing any time you feel short of breath. Breathe in through your nose and then slowly breathe out through your mouth with your lips slightly puckered. It should take you twice as long to breathe out as it did to breathe in. Do not smoke. Nicotine and other chemicals in cigarettes and cigars can cause lung damage and make it harder to breathe. Ask your healthcare provider for information if you currently smoke and need help to quit. E-cigarettes or smokeless tobacco still contain nicotine. Talk to your healthcare provider before you use these products. Reach or maintain a healthy weight.   Your healthcare provider can help you create a safe weight loss plan if you are overweight. Exercise as directed. Exercise can help your lungs work more easily. Exercise can also help you lose weight if needed. Try to get at least 30 minutes of exercise most days of the week. Your healthcare provider can help you create an exercise plan that is safe for you. Follow up with your doctor or specialist as directed:  Write down your questions so you remember to ask them during your visits. © Copyright [facility] 2023 Information is for End User's use only and may not be sold, redistributed or otherwise used for commercial purposes. The above information is an  only. It is not intended as medical advice for individual conditions or treatments. Talk to your doctor, nurse or pharmacist before following any medical regimen to see if it is safe and effective for you.       BRIE Hastings

## 2023-11-07 ENCOUNTER — APPOINTMENT (OUTPATIENT)
Dept: LAB | Facility: HOSPITAL | Age: 68
End: 2023-11-07
Payer: COMMERCIAL

## 2023-11-07 ENCOUNTER — TELEPHONE (OUTPATIENT)
Dept: NEPHROLOGY | Facility: CLINIC | Age: 68
End: 2023-11-07

## 2023-11-07 DIAGNOSIS — Z87.898 HISTORY OF PREDIABETES: ICD-10-CM

## 2023-11-07 DIAGNOSIS — E78.2 MIXED HYPERLIPIDEMIA: ICD-10-CM

## 2023-11-07 DIAGNOSIS — N18.30 STAGE 3 CHRONIC KIDNEY DISEASE, UNSPECIFIED WHETHER STAGE 3A OR 3B CKD (HCC): ICD-10-CM

## 2023-11-07 LAB
ALBUMIN SERPL BCP-MCNC: 4.1 G/DL (ref 3.5–5)
ALP SERPL-CCNC: 52 U/L (ref 34–104)
ALT SERPL W P-5'-P-CCNC: 10 U/L (ref 7–52)
ANION GAP SERPL CALCULATED.3IONS-SCNC: 10 MMOL/L
AST SERPL W P-5'-P-CCNC: 20 U/L (ref 13–39)
BILIRUB SERPL-MCNC: 1.07 MG/DL (ref 0.2–1)
BUN SERPL-MCNC: 12 MG/DL (ref 5–25)
CALCIUM SERPL-MCNC: 9.5 MG/DL (ref 8.4–10.2)
CHLORIDE SERPL-SCNC: 106 MMOL/L (ref 96–108)
CHOLEST SERPL-MCNC: 132 MG/DL
CO2 SERPL-SCNC: 26 MMOL/L (ref 21–32)
CREAT SERPL-MCNC: 0.91 MG/DL (ref 0.6–1.3)
EST. AVERAGE GLUCOSE BLD GHB EST-MCNC: 117 MG/DL
GFR SERPL CREATININE-BSD FRML MDRD: 65 ML/MIN/1.73SQ M
GLUCOSE P FAST SERPL-MCNC: 94 MG/DL (ref 65–99)
HBA1C MFR BLD: 5.7 %
HDLC SERPL-MCNC: 41 MG/DL
LDLC SERPL CALC-MCNC: 66 MG/DL (ref 0–100)
NONHDLC SERPL-MCNC: 91 MG/DL
POTASSIUM SERPL-SCNC: 4.1 MMOL/L (ref 3.5–5.3)
PROT SERPL-MCNC: 7.4 G/DL (ref 6.4–8.4)
SODIUM SERPL-SCNC: 142 MMOL/L (ref 135–147)
TRIGL SERPL-MCNC: 123 MG/DL

## 2023-11-07 PROCEDURE — 80053 COMPREHEN METABOLIC PANEL: CPT

## 2023-11-07 PROCEDURE — 83036 HEMOGLOBIN GLYCOSYLATED A1C: CPT

## 2023-11-07 PROCEDURE — 80061 LIPID PANEL: CPT

## 2023-11-07 PROCEDURE — 36415 COLL VENOUS BLD VENIPUNCTURE: CPT

## 2023-11-14 DIAGNOSIS — E55.9 VITAMIN D DEFICIENCY: ICD-10-CM

## 2023-11-14 RX ORDER — MELATONIN
4000 DAILY
Qty: 360 TABLET | Refills: 0 | Status: SHIPPED | OUTPATIENT
Start: 2023-11-14

## 2023-11-25 DIAGNOSIS — I10 PRIMARY HYPERTENSION: ICD-10-CM

## 2023-11-27 ENCOUNTER — CONSULT (OUTPATIENT)
Dept: NEPHROLOGY | Facility: CLINIC | Age: 68
End: 2023-11-27
Payer: COMMERCIAL

## 2023-11-27 VITALS
BODY MASS INDEX: 44.43 KG/M2 | OXYGEN SATURATION: 99 % | HEART RATE: 85 BPM | DIASTOLIC BLOOD PRESSURE: 82 MMHG | HEIGHT: 55 IN | WEIGHT: 192 LBS | SYSTOLIC BLOOD PRESSURE: 141 MMHG

## 2023-11-27 DIAGNOSIS — N18.30 STAGE 3 CHRONIC KIDNEY DISEASE, UNSPECIFIED WHETHER STAGE 3A OR 3B CKD (HCC): Primary | ICD-10-CM

## 2023-11-27 DIAGNOSIS — E78.2 MIXED HYPERLIPIDEMIA: ICD-10-CM

## 2023-11-27 DIAGNOSIS — E66.01 MORBID OBESITY WITH BMI OF 40.0-44.9, ADULT (HCC): ICD-10-CM

## 2023-11-27 DIAGNOSIS — I10 PRIMARY HYPERTENSION: ICD-10-CM

## 2023-11-27 DIAGNOSIS — Z87.891 FORMER SMOKER: ICD-10-CM

## 2023-11-27 PROCEDURE — 99204 OFFICE O/P NEW MOD 45 MIN: CPT | Performed by: INTERNAL MEDICINE

## 2023-11-27 RX ORDER — LISINOPRIL 5 MG/1
5 TABLET ORAL DAILY
Qty: 90 TABLET | Refills: 0 | Status: SHIPPED | OUTPATIENT
Start: 2023-11-27

## 2023-11-27 NOTE — PROGRESS NOTES
OFFICE CONSULT - Nephrology   Meir Polo 80 y.o. female MRN: [id number]        ASSESSMENT and PLAN:  Robbi Bence was seen today for consult and chronic kidney disease. Diagnoses and all orders for this visit:    Stage 3 chronic kidney disease, unspecified whether stage 3a or 3b CKD ([facility])  -     Ambulatory Referral to Nephrology  -     Albumin / creatinine urine ratio; Future    Primary hypertension    Mixed hyperlipidemia    Former smoker    Morbid obesity with BMI of 40.0-44.9, adult ([facility])        This is a 52-year-old lady with history of hypertension, morbid obesity, hyperlipidemia, who was referred to our office by her primary care doctor for evaluation of chronic kidney disease. #1 chronic kidney disease stage II/IIIa, previous blood test were reviewed with patient, noted creatinine 0.9-1.08 range since 2020. CKD multifactorial in the setting of morbid obesity, hypertension, hyperlipidemia, age-related nephron loss. Recent blood test creatinine 0.91 on 11/7/2023 with an estimated GFR of 65  Discussed with patient about importance of losing weight, follow low-salt diet, avoid NSAIDs and stay well-hydrated. Plan to check microalbumin to creatinine ratio for stratification. I would like to see her back in the office in 1 year with repeat labs. #2 hypertension, currently on low-dose lisinopril, blood pressure improved after recheck discussed how important to follow low-salt diet and to keep working on losing weight. 3.  Hyperlipidemia, on statins, most recent lipid panel significantly improved. #4 morbid obesity, discussed with patient about importance of losing weight, recommend to decrease portion size of her meals, avoiding snacking and increase physical activity as tolerated. Patient Instructions   As we discussed in the office visit and after reviewing your previous blood test you have some mild chronic kidney disease stage II/III.   Based on the most recent blood test your kidney function is a stable. I would like you to go for urine test, we will contact you with the results. It is very important to keep working on losing weight, recommend to decrease portion size of your meals, drink a glass of water before each meal, avoiding snacking and try to increase physical activity as tolerated. It is very important to avoid NSAIDs (no ibuprofen, Motrin, Advil, Aleve, naproxen). Okay to take Tylenol or acetaminophen as needed for pain. Continue close follow-up with your primary care doctor. Follow low-salt diet. I would like to see you back in the office in 1 year with repeat labs      HPI:  Adenike Alva is a 80 y. o.female who was referred by BRIE Cerrato for evaluation of Consult and Chronic Kidney Disease  . This is a patient with history of hyperlipidemia, hypertension, morbid obesity, prior tobacco abuse, who was referred to our office by her primary care doctor for evaluation of mild chronic kidney disease. Today in general is doing well, denies significant complaints. Currently denies any chest pain, no shortness of breath, no leg swelling. Denies any abdominal pain, no recent nausea, no vomiting, no diarrhea, she have chronic constipation but lately well controlled. Denies significant urinary problems, no dysuria, no gross hematuria. Reports used to smoke up to 1 pack a day for several decades, quit smoking 5 years ago. Denies NSAID use. Reports father with history of kidney disease, he has only 1 kidney    I personally spent over half of a total 36 minutes face to face with the patient in counseling and discussion and/or coordination of care as described above. ROS: All the systems were reviewed and were negative except as documented on the HPI.     Allergies: Lansoprazole and Penicillins    Medications:   Current Outpatient Medications:     albuterol (PROVENTIL HFA,VENTOLIN HFA) 90 mcg/act inhaler, INHALE 1 PUFF BY MOUTH EVERY 6 HOURS, Disp: 18 g, Rfl: 3 aspirin (ECOTRIN) 325 mg EC tablet, Take 1 tablet (325 mg total) by mouth daily, Disp: 30 tablet, Rfl: 2    cholecalciferol (VITAMIN D3) 1,000 units tablet, TAKE 4 TABLETS BY MOUTH EVERY DAY, Disp: 360 tablet, Rfl: 0    clindamycin (CLEOCIN T) 1 % lotion, Apply topically 2 (two) times a day, Disp: 60 mL, Rfl: 1    Fluticasone-Salmeterol (Advair) 100-50 mcg/dose inhaler, Inhale 1 puff 2 (two) times a day Rinse mouth after use., Disp: 60 blister, Rfl: 5    lisinopril (ZESTRIL) 5 mg tablet, TAKE 1 TABLET(5 MG) BY MOUTH DAILY, Disp: 90 tablet, Rfl: 0    oxybutynin (DITROPAN) 5 mg tablet, TAKE 1 TABLET(5 MG) BY MOUTH TWICE DAILY, Disp: 180 tablet, Rfl: 1    pantoprazole (PROTONIX) 40 mg tablet, TAKE 1 TABLET(40 MG) BY MOUTH DAILY, Disp: 90 tablet, Rfl: 3    pravastatin (PRAVACHOL) 10 mg tablet, TAKE 1 TABLET(10 MG) BY MOUTH DAILY AT BEDTIME, Disp: 90 tablet, Rfl: 0    Past Medical History:   Diagnosis Date    Asthma     Gout 02/13/2021    Hypertension 10/25/2023    Lateral epicondylitis 10/9/2015    Stage 3 chronic kidney disease, unspecified whether stage 3a or 3b CKD ([address]) 1/13/2022     Past Surgical History:   Procedure Laterality Date    IMPLANTATION VAGAL NERVE STIMULATOR       Family History   Problem Relation Age of Onset    No Known Problems Mother     Kidney disease Father     No Known Problems Sister     No Known Problems Sister     No Known Problems Sister     Cancer Paternal Uncle     No Known Problems Maternal Grandmother     No Known Problems Maternal Grandfather     No Known Problems Paternal Grandmother     No Known Problems Paternal Grandfather       reports that she quit smoking about 8 years ago. Her smoking use included cigarettes. She smoked an average of 1 pack per day. She has quit using smokeless tobacco. She reports that she does not drink alcohol and does not use drugs.       Physical Exam:   Vitals:    11/27/23 1257 11/27/23 1329   BP: 160/78 141/82   BP Location: Left arm Right arm   Patient Position: Sitting Sitting   Cuff Size: Adult    Pulse: 85    SpO2: 99%    Weight: 87.1 kg (192 lb)    Height: 4' 7" (1.397 m)      Body mass index is 44.63 kg/m². General: Morbidly obese, conscious, cooperative, in not acute distress  Eyes: conjunctivae pink, anicteric sclerae  ENT: lips and mucous membranes moist  Neck: supple, no JVD  Chest: clear breath sounds bilateral, no crackles, ronchus or wheezings  CVS: distinct S1 & S2, normal rate, regular rhythm  Abdomen: Obese, non-tender, non-distended, normoactive bowel sounds  Back: no CVA tenderness  Extremities: no edema of both legs  Skin: no rash  Neuro: awake, alert, oriented      Lab Results:   Results for orders placed or performed in visit on 11/07/23   Hemoglobin A1C   Result Value Ref Range    Hemoglobin A1C 5.7 (H) Normal 4.0-5.6%; PreDiabetic 5.7-6.4%; Diabetic >=6.5%; Glycemic control for adults with diabetes <7.0% %     mg/dl   Comprehensive metabolic panel   Result Value Ref Range    Sodium 142 135 - 147 mmol/L    Potassium 4.1 3.5 - 5.3 mmol/L    Chloride 106 96 - 108 mmol/L    CO2 26 21 - 32 mmol/L    ANION GAP 10 mmol/L    BUN 12 5 - 25 mg/dL    Creatinine 0.91 0.60 - 1.30 mg/dL    Glucose, Fasting 94 65 - 99 mg/dL    Calcium 9.5 8.4 - 10.2 mg/dL    AST 20 13 - 39 U/L    ALT 10 7 - 52 U/L    Alkaline Phosphatase 52 34 - 104 U/L    Total Protein 7.4 6.4 - 8.4 g/dL    Albumin 4.1 3.5 - 5.0 g/dL    Total Bilirubin 1.07 (H) 0.20 - 1.00 mg/dL    eGFR 65 ml/min/1.73sq m   Lipid panel   Result Value Ref Range    Cholesterol 132 See Comment mg/dL    Triglycerides 123 See Comment mg/dL    HDL, Direct 41 (L) >=50 mg/dL    LDL Calculated 66 0 - 100 mg/dL    Non-HDL-Chol (CHOL-HDL) 91 mg/dl                 Portions of the record may have been created with voice recognition software. Occasional wrong word or "sound a like" substitutions may have occurred due to the inherent limitations of voice recognition software.  Read the chart carefully and recognize, using context, where substitutions have occurred. If you have any questions, please contact the dictating provider.

## 2023-11-27 NOTE — PATIENT INSTRUCTIONS
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 Based on the most recent blood test your kidney function is a stable. I would like you to go for urine test, we will contact you with the results. It is very important to keep working on losing weight, recommend to decrease portion size of your meals, drink a glass of water before each meal, avoiding snacking and try to increase physical activity as tolerated. It is very important to avoid NSAIDs (no ibuprofen, Motrin, Advil, Aleve, naproxen). Okay to take Tylenol or acetaminophen as needed for pain. Continue close follow-up with your primary care doctor. Follow low-salt diet. I would like to see you back in the office in 1 year with repeat labs. Chronic Kidney Disease   WHAT YOU NEED TO KNOW:   What is chronic kidney disease (CKD)? CKD is the gradual and permanent loss of kidney function. It is also called chronic kidney failure, or chronic renal insufficiency. Normally, the kidneys remove fluid, chemicals, and waste from your blood. These wastes are turned into urine by your kidneys. CKD may worsen over time and lead to kidney failure. What increases my risk for CKD? Diabetes or obesity    High blood pressure or heart disease    Kidney infections or kidney stones    Autoimmune diseases, such as lupus    An enlarged prostate    NSAIDs, illegal drugs, or smoking    Family history of kidney disease    What are the signs and symptoms of CKD? Signs and symptoms depend on how well your kidneys work. You may not have symptoms, or you may have any of the following:  Changes in how often you need to urinate    Swelling in your arms, legs, or feet    Shortness of breath    Fatigue or weakness    Bad or bitter taste in your mouth    Nausea, vomiting, or loss of appetite    How is CKD diagnosed? CKD has 5 stages.  Your healthcare provider will use results from the following tests to find the stage of CKD you have:  Blood and urine tests  show how well your kidneys are working. They may also show the cause of your CKD. Ultrasound, CT scan, or MRI  pictures may be used to check your kidneys. You may be given contrast liquid to help your kidneys show up better in the pictures. Tell the healthcare provider if you have ever had an allergic reaction to contrast liquid. Do not enter the MRI room with anything metal. Metal can cause serious injury. Tell the healthcare provider if you have any metal in or on your body. A biopsy  is a procedure to take tissue from your kidney. It is done to find the cause of your CKD. How is CKD treated? Treatment can help control signs and symptoms, and prevent a worse stage of CKD. Your care team may include specialists, such as a dietitian or a heart specialist. This depends on the stage of your CKD and if you have other health conditions to manage. Healthcare providers will work with you to create a plan based on your decisions for treatment. Your treatment plan may include any of the following:  Medicines  may be given to decrease your blood pressure and get rid of extra fluid. You may also receive medicine to manage health conditions that may occur with CKD, such as anemia, diabetes, and heart disease. Dialysis  is a treatment to remove chemicals and waste from your blood when your kidneys can no longer do this. Surgery  may be needed to create an arteriovenous fistula (AVF) in your arm or insert a catheter into your abdomen. This is done so you can receive dialysis. A kidney transplant  may be done if your CKD becomes severe. What can I do to manage CKD? Management may include making some lifestyle changes. Tell your healthcare provider if you have any concerns about being able to make changes. He or she can help you find solutions, including working with specialists.  Ask for help creating a plan to break large goals into smaller steps. Your plan may include any of the following:  Manage other health conditions. Your healthcare provider will work with you to make a care plan that meets your needs. You will be checked regularly for heart disease or other conditions that can make CKD worse, such as diabetes. Your blood pressure will be closely monitored. You will also get a target blood pressure and help making a plan to reach your target. This may include taking your blood pressure at home. Maintain a healthy weight. Your weight and body mass index (BMI) will be checked regularly. BMI helps find if your weight is healthy for your height. Your healthcare provider will use other tests to check your muscle and protein levels. Extra weight can strain your kidneys. A low weight or low muscle mass can make you feel more tired. You may have trouble doing your daily activities. Ask your provider what a healthy weight is for you. He or she can help you create a plan to lose or gain weight safely, if needed. The plan may include keeping a food diary. This is a list of foods and liquids you have each day. Your provider will use the diary to help you make changes, if needed. Changes are based on your health and any other conditions you have, such as diabetes. Create an exercise plan. Regular exercise can help you manage CKD, high blood pressure, and diabetes. Exercise also helps control weight. Your provider can help you create exercise goals and a plan to reach those goals. For example, your goal may be to exercise for 30 minutes in a day. Your plan can include breaking exercise into 10 minute sessions, 3 times during the day. Create a healthy eating plan. Your provider may tell you to eat food low in potassium, phosphorus, or protein. Your provider may also recommend vitamin or mineral supplements. Do not take any supplements without talking to your provider. A dietitian can help you plan meals if needed.  Ask how much liquid to drink each day and which liquids are best for you. Limit sodium (salt) as directed. You may need to limit sodium to less than 2,300 milligrams (mg) each day. Ask your dietitian or healthcare provider how much sodium you can have each day. The amount depends on your stage of kidney disease. Table salt, canned foods, soups, salted snacks, and processed meats, like deli meats and sausage, are high in sodium. Your provider or a dietitian can show you how to read food labels for sodium. Limit alcohol as directed. Alcohol can cause fluid retention and can affect your kidneys. Ask how much alcohol is safe for you. A drink of alcohol is 12 ounces of beer, 5 ounces of wine, or 1½ ounces of liquor. Do not smoke. Nicotine and other chemicals in cigarettes and cigars can cause kidney damage. Ask your provider for information if you currently smoke and need help to quit. E-cigarettes or smokeless tobacco still contain nicotine. Talk to your provider before you use these products. Ask about over-the-counter medicines. Medicines such as NSAIDs and laxatives may harm your kidneys. Some cough and cold medicines can raise your blood pressure. Always ask if a medicine is safe before you take it. Ask about vaccines you may need. CKD can increase your risk for infections such as pneumonia, influenza, and hepatitis. Vaccines lower your risk for infection. Your healthcare provider will tell you which vaccines you need and when to get them. Call your local emergency number (911 in the [address]) if:   You have a seizure. You have shortness of breath. When should I seek immediate care? You are confused and very drowsy. When should I call my doctor? You suddenly gain or lose more weight than your healthcare provider has told you is okay. You have itchy skin or a rash. You urinate more or less than you normally do. You have blood in your urine. You have nausea and are vomiting.     You have fatigue or muscle weakness. You have hiccups that will not stop. You have questions or concerns about your condition or care. CARE AGREEMENT:   You have the right to help plan your care. Learn about your health condition and how it may be treated. Discuss treatment options with your healthcare providers to decide what care you want to receive. You always have the right to refuse treatment. The above information is an  only. It is not intended as medical advice for individual conditions or treatments. Talk to your doctor, nurse or pharmacist before following any medical regimen to see if it is safe and effective for you. © Copyright [employer] 2023 Information is for End User's use only and may not be sold, redistributed or otherwise used for commercial purposes.

## 2023-12-05 ENCOUNTER — OFFICE VISIT (OUTPATIENT)
Dept: FAMILY MEDICINE CLINIC | Facility: CLINIC | Age: 68
End: 2023-12-05
Payer: COMMERCIAL

## 2023-12-05 VITALS
HEIGHT: 55 IN | SYSTOLIC BLOOD PRESSURE: 128 MMHG | DIASTOLIC BLOOD PRESSURE: 68 MMHG | HEART RATE: 91 BPM | BODY MASS INDEX: 44.16 KG/M2 | OXYGEN SATURATION: 98 % | TEMPERATURE: 97.8 F | WEIGHT: 190.8 LBS

## 2023-12-05 DIAGNOSIS — I10 PRIMARY HYPERTENSION: Primary | ICD-10-CM

## 2023-12-05 DIAGNOSIS — E66.01 MORBID OBESITY WITH BMI OF 40.0-44.9, ADULT (HCC): ICD-10-CM

## 2023-12-05 DIAGNOSIS — I70.209 ATHEROSCLEROSIS OF ARTERIES OF EXTREMITIES (HCC): ICD-10-CM

## 2023-12-05 DIAGNOSIS — R06.00 DYSPNEA, UNSPECIFIED TYPE: ICD-10-CM

## 2023-12-05 DIAGNOSIS — N18.30 STAGE 3 CHRONIC KIDNEY DISEASE, UNSPECIFIED WHETHER STAGE 3A OR 3B CKD (HCC): ICD-10-CM

## 2023-12-05 PROCEDURE — 99214 OFFICE O/P EST MOD 30 MIN: CPT

## 2023-12-05 RX ORDER — FLUTICASONE PROPIONATE AND SALMETEROL 100; 50 UG/1; UG/1
1 POWDER RESPIRATORY (INHALATION) 2 TIMES DAILY
Qty: 60 BLISTER | Refills: 5 | Status: SHIPPED | OUTPATIENT
Start: 2023-12-05 | End: 2024-06-02

## 2023-12-05 NOTE — ASSESSMENT & PLAN NOTE
10/25/23 196lbs  12/05/23 190lbs  Interested in weight loss medications  Declines referral to weight management related to limited access to transportation  Discussed options   Patient is to keep a food and exercise log for 30 days including all meals and 150 minutes a week of moderate exercise. Did not provide a log today   If patient returns with diet and exercise records can consider medications   Side effects of medications and extended wait times discussed at length. No

## 2023-12-05 NOTE — PATIENT INSTRUCTIONS
Chronic Cough   AMBULATORY CARE:   A chronic cough  is a cough that lasts more than 4 weeks in children or 8 weeks in adults. Signs and symptoms you may also have: Wheezing and shortness of breath    A runny or stuffy nose    Pain or itching in your throat    Red, swollen, watery eyes    A raspy or hoarse voice    Heartburn or a sour taste in your mouth    Call your local emergency number (911 in the [address]) for any of the following: You cough up blood. You faint when you cough. You have trouble breathing. Call your doctor if:   You have new or worsening symptoms. You have severe pain when you take a deep breath. You become very tired after a coughing fit. You have trouble sleeping because of the coughing. You have questions or concerns about your condition or care. Treatment for a chronic cough  may depend on the cause. You may need medicine to stop your cough, treat allergies or acid reflux, or decrease swelling in your airways. You will need antibiotics if your cough is caused by a respiratory infection. If you take medicine that causes a chronic cough, it may be stopped or changed. You may need speech therapy. A speech therapist can teach you ways to control your cough. Self-care:   Prevent acid reflex. Acid reflux can make your chronic cough worse. Raise your head and upper back when you sleep. Place 2 or more pillows behind your head or sleep in a recliner. Do not lie down for at least 1 hour after you eat. Do not have foods or drinks that increase heartburn. Ask your healthcare provider for other ways to prevent acid reflux. Do not smoke. Encourage your adolescent child not to smoke. Nicotine and other chemicals in cigarettes and cigars can cause lung damage. They can also make your cough worse. Ask your healthcare provider for information if you currently smoke and need help to quit. E-cigarettes or smokeless tobacco still contain nicotine.  Talk to your healthcare provider before you use these products. Stay away from secondhand smoke. Do not let people smoke in your car, home, or near your child. Do not stand near someone that is smoking. This includes anyone that is smoking an E-cigarrete. Avoid anything that triggers your allergies or irritates your throat. Allergens and irritants can make your chronic cough worse. Allergens may include dust mites, pollen, pet dander, or mold. Wear a mask if you work around pollutants or irritants. Ask your healthcare provider for more ways to decrease your exposure to allergens or irritants. Drink plenty of liquids as directed. Liquids may help relieve throat discomfort that causes you to cough. Add honey to tea or hot water to help ease your throat pain. Ask how much liquid to drink each day and which liquids are best for you. Follow up with your healthcare provider as directed: You may need to return for more tests. Your healthcare provider may refer to you other specialists. Write down your questions so you remember to ask them during your visits. © Copyright [facility] 2023 Information is for End User's use only and may not be sold, redistributed or otherwise used for commercial purposes. The above information is an  only. It is not intended as medical advice for individual conditions or treatments. Talk to your doctor, nurse or pharmacist before following any medical regimen to see if it is safe and effective for you. Patient can purchase over-the-counter Delsym for relief of symptoms  Calorie Counting Diet   WHAT YOU NEED TO KNOW:   What is a calorie counting diet? It is a meal plan based on counting calories each day to reach a healthy body weight. You will need to eat fewer calories if you are trying to lose weight. Weight loss may decrease your risk for certain health problems or improve your health if you have health problems.  Some of these health problems include heart disease, high blood pressure, and diabetes. What foods should I avoid? Your dietitian will tell you if you need to avoid certain foods based on your body weight and health condition. You may need to avoid high-fat foods if you are at risk for or have heart disease. You may need to eat fewer foods from the breads and starches food group if you have diabetes. How many calories are in foods? The following is a list of foods and drinks with the approximate number of calories in each. Check the food label to find the exact number of calories. A dietitian can tell you how many calories you should have from each food group each day.   Carbohydrate:      ½ of a 3-inch bagel, 1 slice of bread, or ½ of a hamburger bun or hot dog bun (80)    1 (8-inch) flour tortilla or ½ cup of cooked rice (100)    1 (6-inch) corn tortilla (80)    1 (6-inch) pancake or 1 cup of bran flakes cereal (110)    ½ cup of cooked cereal (80)    ½ cup of cooked pasta (85)    1 ounce of pretzels (100)    3 cups of air-popped popcorn without butter or oil (80)    Dairy:      1 cup of skim or 1% milk (90)    1 cup of 2% milk (120)    1 cup of whole milk (160)    1 cup of 2% chocolate milk (220)    1 ounce of low-fat cheese with 3 grams of fat per ounce (70)    1 ounce of cheddar cheese (114)    ½ cup of 1% fat cottage cheese (80)    1 cup of plain or sugar-free, fat-free yogurt (90)    Protein foods:      3 ounces of fish (not breaded or fried) (95)    3 ounces of breaded, fried fish (195)    ¾ cup of tuna canned in water (105)    3 ounces of chicken breast without skin (105)    1 fried chicken breast with skin (350)    ¼ cup of fat free egg substitute (40)    1 large egg (75)    3 ounces of lean beef or pork (165)    3 ounces of fried pork chop or ham (185)    ½ cup of cooked dried beans, such as kidney, quiñones, lentils, or navy (115)    3 ounces of bologna or lunch meat (225)    2 links of breakfast sausage (140)    Vegetables:      ½ cup of sliced mushrooms (10)    1 cup of salad greens, such as lettuce, spinach, or coleen (15)    ½ cup of steamed asparagus (20)    ½ cup of cooked summer squash, zucchini squash, or green or wax beans (25)    1 cup of broccoli or cauliflower florets, or 1 medium tomato (25)    1 large raw carrot or ½ cup of cooked carrots (40)    ? of a medium cucumber or 1 stalk of celery (5)    1 small baked potato (160)    1 cup of breaded, fried vegetables (230)    Fruit:      1 (6-inch) banana (55)     ½ of a 4-inch grapefruit (55)    15 grapes (60)    1 medium orange or apple (70)    1 large peach (65)    1 cup of fresh pineapple chunks (75)    1 cup of melon cubes (50)    1¼ cups of whole strawberries (45)    ½ cup of fruit canned in juice (55)    ½ cup of fruit canned in heavy syrup (110)    ?  cup of raisins (130)    ½ cup of unsweetened fruit juice (60)    ½ cup of grape, cranberry, or prune juice (90)    Fat:      10 peanuts or 2 teaspoons of peanut butter (55)    2 tablespoons of avocado or 1 tablespoon of regular salad dressing (45)    2 slices of stern (90)    1 teaspoon of oil, such as safflower, canola, corn, or olive oil (45)    2 teaspoons of low-fat margarine, or 1 tablespoon of low-fat mayonnaise (50)    1 teaspoon of regular margarine (40)    1 tablespoon of regular mayonnaise (135)    1 tablespoon of cream cheese or 2 tablespoons of low-fat cream cheese (45)    2 tablespoons of vegetable shortening (215)    Dessert and sweets:      8 animal crackers or 5 vanilla wafers (80)    1 frozen fruit juice bar (80)    ½ cup of ice milk or low-fat frozen yogurt (90)    ½ cup of sherbet or sorbet (125)    ½ cup of sugar-free pudding or custard (60)    ½ cup of ice cream (140)    ½ cup of pudding or custard (175)    1 (2-inch) square chocolate brownie (185)    Combination foods:      Bean burrito made with an 8-inch tortilla, without cheese (275)    Chicken breast sandwich with lettuce and tomato (325)    1 cup of chicken noodle soup (60)    1 beef taco (175)    Regular hamburger with lettuce and tomato (310)    Regular cheeseburger with lettuce and tomato (410)     ¼ of a 12-inch cheese pizza (280)    Fried fish sandwich with lettuce and tomato (425)    Hot dog and bun (275)    1½ cups of macaroni and cheese (310)    Taco salad with a fried tortilla shell (870)    Low-calorie foods:      1 tablespoon of ketchup or 1 tablespoon of fat free sour cream (15)    1 teaspoon of mustard (5)    ¼ cup of salsa (20)    1 large dill pickle (15)    1 tablespoon of fat free salad dressing (10)    2 teaspoons of low-sugar, light jam or jelly, or 1 tablespoon of sugar-free syrup (15)    1 sugar-free popsicle (15)    1 cup of club soda, seltzer water, or diet soda (0)    CARE AGREEMENT:   You have the right to help plan your care. Discuss treatment options with your healthcare provider to decide what care you want to receive. You always have the right to refuse treatment. The above information is an  only. It is not intended as medical advice for individual conditions or treatments. Talk to your doctor, nurse or pharmacist before following any medical regimen to see if it is safe and effective for you. © Copyright [employer] 2023 Information is for End User's use only and may not be sold, redistributed or otherwise used for commercial purposes.

## 2023-12-05 NOTE — ASSESSMENT & PLAN NOTE
Today's /68  Current medication: Lisinopril 5mg  At last visit patient was started on Lisinopril 5mg after two elevated BP in the office   Doing well on new medication, does not report side effects     Advised patient on symptoms of hypotension & severe HTN  Limit salt-intake & caffeine.  DASH diet discussed, minimize stress level  Weight reduction efforts via improved diet & increased exercise

## 2023-12-05 NOTE — PROGRESS NOTES
Name: Meir Polo      : 1955      MRN: [id number]  Encounter Provider: BRIE De La Garza  Encounter Date: 2023   Encounter department: [facility]     1. Primary hypertension  Assessment & Plan: Today's /68  Current medication: Lisinopril 5mg  At last visit patient was started on Lisinopril 5mg after two elevated BP in the office     Advised patient on symptoms of hypotension & severe HTN  Limit salt-intake & caffeine. DASH diet discussed, minimize stress level  Weight reduction efforts via improved diet & increased exercise  Patient is to follow up in 4 weeks for blood pressure recheck       2. Dyspnea, unspecified type  Assessment & Plan:  Shortness of breath has increased in intensity over the past few weeks  Current medication: Albuterol and  Fluticisone-Salmeterol   Was previously started on Fluticisone-Salmeterol at last visit   Patient states she never received medication   Reordered today  Shortness of breath can also be related to high BMI and low exertional threshold    Patient also exhibits signs of sleep apnea including   Waking up unrefreshed, snoring and shortness of breath while lying supine   Referral to sleep medicine for evaluation of sleep apnea     Orders:  -     Fluticasone-Salmeterol (Advair) 100-50 mcg/dose inhaler; Inhale 1 puff 2 (two) times a day Rinse mouth after use. 3. Morbid obesity with BMI of 40.0-44.9, adult [facility])  Assessment & Plan:  Interested in weight loss medications  Declines referral to weight management related to limited access to transportation  Discussed options   Patient is to keep a food and exercise log for 30 days including all meals and 150 minutes a week of moderate exercise. Did not provide a log today   If patient returns with diet and exercise records can consider medications   Side effects of medications and extended wait times discussed at length.        4. Stage 3 chronic kidney disease, unspecified whether stage 3a or 3b CKD ([address] [facility])  Assessment & Plan:  Lab Results   Component Value Date    EGFR 65 11/07/2023    EGFR 55 02/06/2023    EGFR 57 07/07/2022    CREATININE 0.91 11/07/2023    CREATININE 1.05 02/06/2023    CREATININE 1.02 07/07/2022      Evaluated by nephrology              Subjective      Hypertension  This is a new problem. The current episode started 1 to 4 weeks ago. The problem has been gradually improving since onset. The problem is controlled. Associated symptoms include shortness of breath. Pertinent negatives include no anxiety, blurred vision, chest pain, headaches, malaise/fatigue, neck pain, orthopnea, palpitations, peripheral edema, PND or sweats. There are no associated agents to hypertension. Risk factors for coronary artery disease include obesity and post-menopausal state. Past treatments include nothing. The current treatment provides significant improvement. Compliance problems include diet and exercise. Review of Systems   Constitutional:  Negative for activity change, chills, fatigue, fever and malaise/fatigue. HENT:  Negative for congestion, ear pain, rhinorrhea, sore throat and trouble swallowing. Eyes:  Negative for blurred vision, pain and visual disturbance. Respiratory:  Positive for cough and shortness of breath. Negative for chest tightness. Cardiovascular:  Negative for chest pain, palpitations, orthopnea, leg swelling and PND. Gastrointestinal:  Negative for abdominal pain, constipation, diarrhea, nausea and vomiting. Genitourinary:  Negative for difficulty urinating, dysuria, hematuria and urgency. Musculoskeletal:  Negative for arthralgias, back pain and neck pain. Skin:  Negative for color change and rash. Neurological:  Negative for dizziness, seizures, syncope and headaches. Psychiatric/Behavioral:  Negative for dysphoric mood. The patient is not nervous/anxious.     All other systems reviewed and are negative. Current Outpatient Medications on File Prior to Visit   Medication Sig    albuterol (PROVENTIL HFA,VENTOLIN HFA) 90 mcg/act inhaler INHALE 1 PUFF BY MOUTH EVERY 6 HOURS    aspirin (ECOTRIN) 325 mg EC tablet Take 1 tablet (325 mg total) by mouth daily    cholecalciferol (VITAMIN D3) 1,000 units tablet TAKE 4 TABLETS BY MOUTH EVERY DAY    clindamycin (CLEOCIN T) 1 % lotion Apply topically 2 (two) times a day    lisinopril (ZESTRIL) 5 mg tablet TAKE 1 TABLET(5 MG) BY MOUTH DAILY    oxybutynin (DITROPAN) 5 mg tablet TAKE 1 TABLET(5 MG) BY MOUTH TWICE DAILY    pravastatin (PRAVACHOL) 10 mg tablet TAKE 1 TABLET(10 MG) BY MOUTH DAILY AT BEDTIME    [DISCONTINUED] Fluticasone-Salmeterol (Advair) 100-50 mcg/dose inhaler Inhale 1 puff 2 (two) times a day Rinse mouth after use. [DISCONTINUED] pantoprazole (PROTONIX) 40 mg tablet TAKE 1 TABLET(40 MG) BY MOUTH DAILY       Objective     /68 (BP Location: Left arm, Patient Position: Sitting, Cuff Size: Adult)   Pulse 91   Temp 97.8 °F (36.6 °C)   Ht 4' 7" (1.397 m)   Wt 86.5 kg (190 lb 12.8 oz)   SpO2 98%   BMI 44.35 kg/m²     Physical Exam  Vitals and nursing note reviewed. Constitutional:       Appearance: Normal appearance. She is normal weight. HENT:      Head: Normocephalic. Right Ear: Tympanic membrane, ear canal and external ear normal. There is no impacted cerumen. Left Ear: Tympanic membrane, ear canal and external ear normal. There is no impacted cerumen. Nose: Nose normal.      Mouth/Throat:      Mouth: Mucous membranes are moist.      Pharynx: Oropharynx is clear. Eyes:      Extraocular Movements: Extraocular movements intact. Conjunctiva/sclera: Conjunctivae normal.      Pupils: Pupils are equal, round, and reactive to light. Cardiovascular:      Rate and Rhythm: Normal rate and regular rhythm. Pulses: Normal pulses. Heart sounds: Normal heart sounds.    Pulmonary:      Effort: Pulmonary effort is normal.      Breath sounds: Normal breath sounds. Abdominal:      General: Bowel sounds are normal. There is no distension. Palpations: Abdomen is soft. Tenderness: There is no abdominal tenderness. Musculoskeletal:         General: Normal range of motion. Cervical back: Normal range of motion. Skin:     General: Skin is warm. Capillary Refill: Capillary refill takes less than 2 seconds. Neurological:      General: No focal deficit present. Mental Status: She is alert and oriented to person, place, and time. Mental status is at baseline. Psychiatric:         Mood and Affect: Mood normal.         Behavior: Behavior normal.         Thought Content: Thought content normal.         Judgment: Judgment normal.       Patient Instructions   Chronic Cough   AMBULATORY CARE:   A chronic cough  is a cough that lasts more than 4 weeks in children or 8 weeks in adults. Signs and symptoms you may also have: Wheezing and shortness of breath    A runny or stuffy nose    Pain or itching in your throat    Red, swollen, watery eyes    A raspy or hoarse voice    Heartburn or a sour taste in your mouth    Call your local emergency number (911 in the [address]) for any of the following: You cough up blood. You faint when you cough. You have trouble breathing. Call your doctor if:   You have new or worsening symptoms. You have severe pain when you take a deep breath. You become very tired after a coughing fit. You have trouble sleeping because of the coughing. You have questions or concerns about your condition or care. Treatment for a chronic cough  may depend on the cause. You may need medicine to stop your cough, treat allergies or acid reflux, or decrease swelling in your airways. You will need antibiotics if your cough is caused by a respiratory infection. If you take medicine that causes a chronic cough, it may be stopped or changed. You may need speech therapy.  A speech therapist can teach you ways to control your cough. Self-care:   Prevent acid reflex. Acid reflux can make your chronic cough worse. Raise your head and upper back when you sleep. Place 2 or more pillows behind your head or sleep in a recliner. Do not lie down for at least 1 hour after you eat. Do not have foods or drinks that increase heartburn. Ask your healthcare provider for other ways to prevent acid reflux. Do not smoke. Encourage your adolescent child not to smoke. Nicotine and other chemicals in cigarettes and cigars can cause lung damage. They can also make your cough worse. Ask your healthcare provider for information if you currently smoke and need help to quit. E-cigarettes or smokeless tobacco still contain nicotine. Talk to your healthcare provider before you use these products. Stay away from secondhand smoke. Do not let people smoke in your car, home, or near your child. Do not stand near someone that is smoking. This includes anyone that is smoking an E-cigarrete. Avoid anything that triggers your allergies or irritates your throat. Allergens and irritants can make your chronic cough worse. Allergens may include dust mites, pollen, pet dander, or mold. Wear a mask if you work around pollutants or irritants. Ask your healthcare provider for more ways to decrease your exposure to allergens or irritants. Drink plenty of liquids as directed. Liquids may help relieve throat discomfort that causes you to cough. Add honey to tea or hot water to help ease your throat pain. Ask how much liquid to drink each day and which liquids are best for you. Follow up with your healthcare provider as directed: You may need to return for more tests. Your healthcare provider may refer to you other specialists. Write down your questions so you remember to ask them during your visits.   © Copyright [employer] 2023 Information is for End User's use only and may not be sold, redistributed or otherwise used for commercial purposes. The above information is an  only. It is not intended as medical advice for individual conditions or treatments. Talk to your doctor, nurse or pharmacist before following any medical regimen to see if it is safe and effective for you. Patient can purchase over-the-counter Delsym for relief of symptoms  Calorie Counting Diet   WHAT YOU NEED TO KNOW:   What is a calorie counting diet? It is a meal plan based on counting calories each day to reach a healthy body weight. You will need to eat fewer calories if you are trying to lose weight. Weight loss may decrease your risk for certain health problems or improve your health if you have health problems. Some of these health problems include heart disease, high blood pressure, and diabetes. What foods should I avoid? Your dietitian will tell you if you need to avoid certain foods based on your body weight and health condition. You may need to avoid high-fat foods if you are at risk for or have heart disease. You may need to eat fewer foods from the breads and starches food group if you have diabetes. How many calories are in foods? The following is a list of foods and drinks with the approximate number of calories in each. Check the food label to find the exact number of calories. A dietitian can tell you how many calories you should have from each food group each day.   Carbohydrate:      ½ of a 3-inch bagel, 1 slice of bread, or ½ of a hamburger bun or hot dog bun (80)    1 (8-inch) flour tortilla or ½ cup of cooked rice (100)    1 (6-inch) corn tortilla (80)    1 (6-inch) pancake or 1 cup of bran flakes cereal (110)    ½ cup of cooked cereal (80)    ½ cup of cooked pasta (85)    1 ounce of pretzels (100)    3 cups of air-popped popcorn without butter or oil (80)    Dairy:      1 cup of skim or 1% milk (90)    1 cup of 2% milk (120)    1 cup of whole milk (160)    1 cup of 2% chocolate milk (220)    1 ounce of low-fat cheese with 3 grams of fat per ounce (70)    1 ounce of cheddar cheese (114)    ½ cup of 1% fat cottage cheese (80)    1 cup of plain or sugar-free, fat-free yogurt (90)    Protein foods:      3 ounces of fish (not breaded or fried) (95)    3 ounces of breaded, fried fish (195)    ¾ cup of tuna canned in water (105)    3 ounces of chicken breast without skin (105)    1 fried chicken breast with skin (350)    ¼ cup of fat free egg substitute (40)    1 large egg (75)    3 ounces of lean beef or pork (165)    3 ounces of fried pork chop or ham (185)    ½ cup of cooked dried beans, such as kidney, quiñones, lentils, or navy (115)    3 ounces of bologna or lunch meat (225)    2 links of breakfast sausage (140)    Vegetables:      ½ cup of sliced mushrooms (10)    1 cup of salad greens, such as lettuce, spinach, or coleen (15)    ½ cup of steamed asparagus (20)    ½ cup of cooked summer squash, zucchini squash, or green or wax beans (25)    1 cup of broccoli or cauliflower florets, or 1 medium tomato (25)    1 large raw carrot or ½ cup of cooked carrots (40)    ? of a medium cucumber or 1 stalk of celery (5)    1 small baked potato (160)    1 cup of breaded, fried vegetables (230)    Fruit:      1 (6-inch) banana (55)     ½ of a 4-inch grapefruit (55)    15 grapes (60)    1 medium orange or apple (70)    1 large peach (65)    1 cup of fresh pineapple chunks (75)    1 cup of melon cubes (50)    1¼ cups of whole strawberries (45)    ½ cup of fruit canned in juice (55)    ½ cup of fruit canned in heavy syrup (110)    ?  cup of raisins (130)    ½ cup of unsweetened fruit juice (60)    ½ cup of grape, cranberry, or prune juice (90)    Fat:      10 peanuts or 2 teaspoons of peanut butter (55)    2 tablespoons of avocado or 1 tablespoon of regular salad dressing (45)    2 slices of stern (90)    1 teaspoon of oil, such as safflower, canola, corn, or olive oil (45)    2 teaspoons of low-fat margarine, or 1 tablespoon of low-fat mayonnaise (50)    1 teaspoon of regular margarine (40)    1 tablespoon of regular mayonnaise (135)    1 tablespoon of cream cheese or 2 tablespoons of low-fat cream cheese (45)    2 tablespoons of vegetable shortening (215)    Dessert and sweets:      8 animal crackers or 5 vanilla wafers (80)    1 frozen fruit juice bar (80)    ½ cup of ice milk or low-fat frozen yogurt (90)    ½ cup of sherbet or sorbet (125)    ½ cup of sugar-free pudding or custard (60)    ½ cup of ice cream (140)    ½ cup of pudding or custard (175)    1 (2-inch) square chocolate brownie (185)    Combination foods:      Bean burrito made with an 8-inch tortilla, without cheese (275)    Chicken breast sandwich with lettuce and tomato (325)    1 cup of chicken noodle soup (60)    1 beef taco (175)    Regular hamburger with lettuce and tomato (310)    Regular cheeseburger with lettuce and tomato (410)     ¼ of a 12-inch cheese pizza (280)    Fried fish sandwich with lettuce and tomato (425)    Hot dog and bun (275)    1½ cups of macaroni and cheese (310)    Taco salad with a fried tortilla shell (870)    Low-calorie foods:      1 tablespoon of ketchup or 1 tablespoon of fat free sour cream (15)    1 teaspoon of mustard (5)    ¼ cup of salsa (20)    1 large dill pickle (15)    1 tablespoon of fat free salad dressing (10)    2 teaspoons of low-sugar, light jam or jelly, or 1 tablespoon of sugar-free syrup (15)    1 sugar-free popsicle (15)    1 cup of club soda, seltzer water, or diet soda (0)    CARE AGREEMENT:   You have the right to help plan your care. Discuss treatment options with your healthcare provider to decide what care you want to receive. You always have the right to refuse treatment. The above information is an  only. It is not intended as medical advice for individual conditions or treatments. Talk to your doctor, nurse or pharmacist before following any medical regimen to see if it is safe and effective for you.   © Copyright [employer] 2023 Information is for End User's use only and may not be sold, redistributed or otherwise used for commercial purposes. BRIE Butcher    BMI Counseling: Body mass index is 44.35 kg/m². The BMI is above normal. Nutrition recommendations include reducing portion sizes, 3-5 servings of fruits/vegetables daily, consuming healthier snacks, moderation in carbohydrate intake, reducing intake of saturated fat and trans fat, and reducing intake of cholesterol. Exercise recommendations include exercising 3-5 times per week.

## 2023-12-05 NOTE — ASSESSMENT & PLAN NOTE
Shortness of breath has increased in intensity over the past few weeks  Current medication: Albuterol and  Fluticisone-Salmeterol   Was previously started on Fluticisone-Salmeterol at last visit   Patient states she never received medication   Reordered today  Shortness of breath can also be related to high BMI and low exertional threshold    Patient also exhibits signs of sleep apnea including   Waking up unrefreshed, snoring and shortness of breath while lying supine   Referral to sleep medicine for evaluation of sleep apnea

## 2023-12-05 NOTE — ASSESSMENT & PLAN NOTE
Lab Results   Component Value Date    EGFR 65 11/07/2023    EGFR 55 02/06/2023    EGFR 57 07/07/2022    CREATININE 0.91 11/07/2023    CREATININE 1.05 02/06/2023    CREATININE 1.02 07/07/2022      Evaluated by nephrology     CKD multifactorial   Morbid obesity, hypertension, hyperlipidemia, age-related nephron loss. Discussed with patient about importance of lifestyle modifications   Weight loss (down 6lbs since last visit), low-salt diet, avoid NSAIDs and stay well-hydrated.   Plan to check microalbumin to creatinine ratio- still needs to be collected

## 2023-12-12 ENCOUNTER — APPOINTMENT (EMERGENCY)
Dept: CT IMAGING | Facility: HOSPITAL | Age: 68
DRG: 378 | End: 2023-12-12
Payer: COMMERCIAL

## 2023-12-12 ENCOUNTER — HOSPITAL ENCOUNTER (INPATIENT)
Facility: HOSPITAL | Age: 68
LOS: 1 days | Discharge: HOME | DRG: 378 | End: 2023-12-13
Attending: EMERGENCY MEDICINE | Admitting: INTERNAL MEDICINE
Payer: COMMERCIAL

## 2023-12-12 DIAGNOSIS — R42 DIZZINESS: ICD-10-CM

## 2023-12-12 DIAGNOSIS — K92.2 GI BLEED: Primary | ICD-10-CM

## 2023-12-12 DIAGNOSIS — D64.9 LOW HEMOGLOBIN: ICD-10-CM

## 2023-12-12 DIAGNOSIS — D64.9 ANEMIA: ICD-10-CM

## 2023-12-12 PROBLEM — N18.31 STAGE 3A CHRONIC KIDNEY DISEASE (HCC): Chronic | Status: ACTIVE | Noted: 2022-01-13

## 2023-12-12 PROBLEM — N18.31 STAGE 3A CHRONIC KIDNEY DISEASE (HCC): Status: ACTIVE | Noted: 2022-01-13

## 2023-12-12 PROBLEM — E66.01 MORBID OBESITY WITH BMI OF 45.0-49.9, ADULT (HCC): Chronic | Status: ACTIVE | Noted: 2023-10-25

## 2023-12-12 PROBLEM — I10 PRIMARY HYPERTENSION: Chronic | Status: ACTIVE | Noted: 2023-10-25

## 2023-12-12 PROBLEM — K86.2 PANCREATIC CYST: Status: ACTIVE | Noted: 2023-12-12

## 2023-12-12 LAB
2HR DELTA HS TROPONIN: 2 NG/L
ABO GROUP BLD: NORMAL
ABO GROUP BLD: NORMAL
ALBUMIN SERPL BCP-MCNC: 4.1 G/DL (ref 3.5–5)
ALP SERPL-CCNC: 48 U/L (ref 34–104)
ALT SERPL W P-5'-P-CCNC: 12 U/L (ref 7–52)
ANION GAP SERPL CALCULATED.3IONS-SCNC: 12 MMOL/L
AST SERPL W P-5'-P-CCNC: 18 U/L (ref 13–39)
ATRIAL RATE: 98 BPM
BASOPHILS # BLD AUTO: 0.06 THOUSANDS/ÂΜL (ref 0–0.1)
BASOPHILS NFR BLD AUTO: 1 % (ref 0–1)
BILIRUB SERPL-MCNC: 0.68 MG/DL (ref 0.2–1)
BLD GP AB SCN SERPL QL: NEGATIVE
BUN SERPL-MCNC: 15 MG/DL (ref 5–25)
CALCIUM SERPL-MCNC: 9.4 MG/DL (ref 8.4–10.2)
CARDIAC TROPONIN I PNL SERPL HS: 3 NG/L
CARDIAC TROPONIN I PNL SERPL HS: 5 NG/L
CHLORIDE SERPL-SCNC: 105 MMOL/L (ref 96–108)
CO2 SERPL-SCNC: 23 MMOL/L (ref 21–32)
CREAT SERPL-MCNC: 0.94 MG/DL (ref 0.6–1.3)
EOSINOPHIL # BLD AUTO: 0.14 THOUSAND/ÂΜL (ref 0–0.61)
EOSINOPHIL NFR BLD AUTO: 3 % (ref 0–6)
ERYTHROCYTE [DISTWIDTH] IN BLOOD BY AUTOMATED COUNT: 20.6 % (ref 11.6–15.1)
FERRITIN SERPL-MCNC: 2 NG/ML (ref 11–307)
GFR SERPL CREATININE-BSD FRML MDRD: 62 ML/MIN/1.73SQ M
GLUCOSE SERPL-MCNC: 112 MG/DL (ref 65–140)
GLUCOSE SERPL-MCNC: 119 MG/DL (ref 65–140)
HCT VFR BLD AUTO: 23.8 % (ref 34.8–46.1)
HEMOCCULT STL QL: ABNORMAL
HEMOCCULT STL QL: ABNORMAL
HEMOCCULT STL QL: POSITIVE
HGB BLD-MCNC: 5.8 G/DL (ref 11.5–15.4)
IMM GRANULOCYTES # BLD AUTO: 0.02 THOUSAND/UL (ref 0–0.2)
IMM GRANULOCYTES NFR BLD AUTO: 0 % (ref 0–2)
INR PPP: 1.03 (ref 0.84–1.19)
IRON SATN MFR SERPL: 1 % (ref 15–50)
IRON SERPL-MCNC: 10 UG/DL (ref 50–212)
LYMPHOCYTES # BLD AUTO: 1.17 THOUSANDS/ÂΜL (ref 0.6–4.47)
LYMPHOCYTES NFR BLD AUTO: 23 % (ref 14–44)
MCH RBC QN AUTO: 15.3 PG (ref 26.8–34.3)
MCHC RBC AUTO-ENTMCNC: 24.4 G/DL (ref 31.4–37.4)
MCV RBC AUTO: 63 FL (ref 82–98)
MONOCYTES # BLD AUTO: 0.34 THOUSAND/ÂΜL (ref 0.17–1.22)
MONOCYTES NFR BLD AUTO: 7 % (ref 4–12)
NEUTROPHILS # BLD AUTO: 3.37 THOUSANDS/ÂΜL (ref 1.85–7.62)
NEUTS SEG NFR BLD AUTO: 66 % (ref 43–75)
NRBC BLD AUTO-RTO: 0 /100 WBCS
P AXIS: -12 DEGREES
PLATELET # BLD AUTO: 247 THOUSANDS/UL (ref 149–390)
POTASSIUM SERPL-SCNC: 3.7 MMOL/L (ref 3.5–5.3)
PR INTERVAL: 146 MS
PROT SERPL-MCNC: 7.4 G/DL (ref 6.4–8.4)
PROTHROMBIN TIME: 13.9 SECONDS (ref 11.6–14.5)
QRS AXIS: 6 DEGREES
QRSD INTERVAL: 82 MS
QT INTERVAL: 358 MS
QTC INTERVAL: 457 MS
RBC # BLD AUTO: 3.8 MILLION/UL (ref 3.81–5.12)
RH BLD: POSITIVE
RH BLD: POSITIVE
SODIUM SERPL-SCNC: 140 MMOL/L (ref 135–147)
SPECIMEN EXPIRATION DATE: NORMAL
T WAVE AXIS: 38 DEGREES
TIBC SERPL-MCNC: 758 UG/DL (ref 250–450)
UIBC SERPL-MCNC: 748 UG/DL (ref 155–355)
VENTRICULAR RATE: 98 BPM
WBC # BLD AUTO: 5.1 THOUSAND/UL (ref 4.31–10.16)

## 2023-12-12 PROCEDURE — G1004 CDSM NDSC: HCPCS

## 2023-12-12 PROCEDURE — 83550 IRON BINDING TEST: CPT | Performed by: PHYSICIAN ASSISTANT

## 2023-12-12 PROCEDURE — C9113 INJ PANTOPRAZOLE SODIUM, VIA: HCPCS | Performed by: PHYSICIAN ASSISTANT

## 2023-12-12 PROCEDURE — 85025 COMPLETE CBC W/AUTO DIFF WBC: CPT

## 2023-12-12 PROCEDURE — 86921 COMPATIBILITY TEST INCUBATE: CPT

## 2023-12-12 PROCEDURE — 93005 ELECTROCARDIOGRAM TRACING: CPT

## 2023-12-12 PROCEDURE — 86922 COMPATIBILITY TEST ANTIGLOB: CPT

## 2023-12-12 PROCEDURE — 30233N1 TRANSFUSION OF NONAUTOLOGOUS RED BLOOD CELLS INTO PERIPHERAL VEIN, PERCUTANEOUS APPROACH: ICD-10-PCS

## 2023-12-12 PROCEDURE — 82272 OCCULT BLD FECES 1-3 TESTS: CPT

## 2023-12-12 PROCEDURE — 96365 THER/PROPH/DIAG IV INF INIT: CPT

## 2023-12-12 PROCEDURE — 99285 EMERGENCY DEPT VISIT HI MDM: CPT

## 2023-12-12 PROCEDURE — C9113 INJ PANTOPRAZOLE SODIUM, VIA: HCPCS

## 2023-12-12 PROCEDURE — P9016 RBC LEUKOCYTES REDUCED: HCPCS

## 2023-12-12 PROCEDURE — 83540 ASSAY OF IRON: CPT | Performed by: PHYSICIAN ASSISTANT

## 2023-12-12 PROCEDURE — 84484 ASSAY OF TROPONIN QUANT: CPT

## 2023-12-12 PROCEDURE — 86901 BLOOD TYPING SEROLOGIC RH(D): CPT

## 2023-12-12 PROCEDURE — 36415 COLL VENOUS BLD VENIPUNCTURE: CPT

## 2023-12-12 PROCEDURE — 82948 REAGENT STRIP/BLOOD GLUCOSE: CPT

## 2023-12-12 PROCEDURE — 85610 PROTHROMBIN TIME: CPT

## 2023-12-12 PROCEDURE — 82272 OCCULT BLD FECES 1-3 TESTS: CPT | Performed by: PHYSICIAN ASSISTANT

## 2023-12-12 PROCEDURE — 86850 RBC ANTIBODY SCREEN: CPT

## 2023-12-12 PROCEDURE — 96375 TX/PRO/DX INJ NEW DRUG ADDON: CPT

## 2023-12-12 PROCEDURE — 86900 BLOOD TYPING SEROLOGIC ABO: CPT

## 2023-12-12 PROCEDURE — 82728 ASSAY OF FERRITIN: CPT | Performed by: PHYSICIAN ASSISTANT

## 2023-12-12 PROCEDURE — 36430 TRANSFUSION BLD/BLD COMPNT: CPT

## 2023-12-12 PROCEDURE — 96361 HYDRATE IV INFUSION ADD-ON: CPT

## 2023-12-12 PROCEDURE — 80053 COMPREHEN METABOLIC PANEL: CPT

## 2023-12-12 PROCEDURE — 74178 CT ABD&PLV WO CNTR FLWD CNTR: CPT

## 2023-12-12 PROCEDURE — 99223 1ST HOSP IP/OBS HIGH 75: CPT | Performed by: INTERNAL MEDICINE

## 2023-12-12 RX ORDER — PANTOPRAZOLE SODIUM 40 MG/10ML
40 INJECTION, POWDER, LYOPHILIZED, FOR SOLUTION INTRAVENOUS ONCE
Status: COMPLETED | OUTPATIENT
Start: 2023-12-12 | End: 2023-12-12

## 2023-12-12 RX ORDER — SODIUM CHLORIDE 9 MG/ML
75 INJECTION, SOLUTION INTRAVENOUS CONTINUOUS
Status: DISPENSED | OUTPATIENT
Start: 2023-12-12 | End: 2023-12-12

## 2023-12-12 RX ORDER — CEFTRIAXONE 1 G/50ML
1000 INJECTION, SOLUTION INTRAVENOUS ONCE
Status: COMPLETED | OUTPATIENT
Start: 2023-12-12 | End: 2023-12-12

## 2023-12-12 RX ORDER — ONDANSETRON 2 MG/ML
4 INJECTION INTRAMUSCULAR; INTRAVENOUS EVERY 6 HOURS PRN
Status: DISCONTINUED | OUTPATIENT
Start: 2023-12-12 | End: 2023-12-13 | Stop reason: HOSPADM

## 2023-12-12 RX ORDER — ACETAMINOPHEN 325 MG/1
650 TABLET ORAL EVERY 6 HOURS PRN
Status: DISCONTINUED | OUTPATIENT
Start: 2023-12-12 | End: 2023-12-13 | Stop reason: HOSPADM

## 2023-12-12 RX ORDER — LISINOPRIL 5 MG/1
5 TABLET ORAL DAILY
Status: DISCONTINUED | OUTPATIENT
Start: 2023-12-12 | End: 2023-12-13 | Stop reason: HOSPADM

## 2023-12-12 RX ORDER — FLUTICASONE FUROATE AND VILANTEROL 100; 25 UG/1; UG/1
1 POWDER RESPIRATORY (INHALATION)
Status: DISCONTINUED | OUTPATIENT
Start: 2023-12-12 | End: 2023-12-13 | Stop reason: HOSPADM

## 2023-12-12 RX ORDER — PANTOPRAZOLE SODIUM 40 MG/10ML
40 INJECTION, POWDER, LYOPHILIZED, FOR SOLUTION INTRAVENOUS EVERY 12 HOURS SCHEDULED
Status: DISCONTINUED | OUTPATIENT
Start: 2023-12-12 | End: 2023-12-13

## 2023-12-12 RX ADMIN — CEFTRIAXONE 1000 MG: 1 INJECTION, SOLUTION INTRAVENOUS at 03:41

## 2023-12-12 RX ADMIN — LISINOPRIL 5 MG: 5 TABLET ORAL at 08:00

## 2023-12-12 RX ADMIN — PANTOPRAZOLE SODIUM 40 MG: 40 INJECTION, POWDER, FOR SOLUTION INTRAVENOUS at 20:54

## 2023-12-12 RX ADMIN — IOHEXOL 100 ML: 350 INJECTION, SOLUTION INTRAVENOUS at 04:16

## 2023-12-12 RX ADMIN — PANTOPRAZOLE SODIUM 40 MG: 40 INJECTION, POWDER, FOR SOLUTION INTRAVENOUS at 03:39

## 2023-12-12 RX ADMIN — SODIUM CHLORIDE 500 ML: 0.9 INJECTION, SOLUTION INTRAVENOUS at 03:01

## 2023-12-12 RX ADMIN — FLUTICASONE FUROATE AND VILANTEROL TRIFENATATE 1 PUFF: 100; 25 POWDER RESPIRATORY (INHALATION) at 08:00

## 2023-12-12 RX ADMIN — POLYETHYLENE GLYCOL-3350 AND ELECTROLYTES 4000 ML: 236; 6.74; 5.86; 2.97; 22.74 POWDER, FOR SOLUTION ORAL at 16:25

## 2023-12-12 NOTE — ED NOTES
Assisted oob to go to the bathroom - tacycardic heart rate when ob - 130's - once activity completed, heart rate down to the 89's-90's.      Alla Mcdaniels RN  12/12/23 [id number]

## 2023-12-12 NOTE — CASE MANAGEMENT
Case Management Assessment & Discharge Planning Note    Patient name Hien Fong  Location [facility] 704-01 MRN [id number]  : 1955 Date 2023       Current Admission Date: 2023  Current Admission Diagnosis:Anemia   Patient Active Problem List    Diagnosis Date Noted    Pancreatic cyst 2023    Anemia 2023    Primary hypertension 10/25/2023    Morbid obesity with BMI of 45.0-49.9, adult ([address]) 10/25/2023    Atherosclerosis of arteries of extremities ([address]) 2023    History of prediabetes 2023    Stage 3a chronic kidney disease ([address]) 2022    Screening for malignant neoplasm of breast 2018    Vitamin D deficiency 2018    Carpal tunnel syndrome 2016    Dyspnea 2016    Dyslipidemia 2016    Hyperlipidemia 2015    Lateral epicondylitis 10/09/2015    Former smoker 10/09/2015      LOS (days): 0  Geometric Mean LOS (GMLOS) (days):   Days to GMLOS:     OBJECTIVE:           Current admission status: Observation    Preferred Pharmacy:   [address] [address]  Phone: [phone] Fax: [phone]    Primary Care Provider: BRIE Martins    Primary Insurance: [facility]  Secondary Insurance: [facility]    ASSESSMENT:  Active Health Care Proxies    There are no active Health Care Proxies on file. Readmission Root Cause  30 Day Readmission: No    Patient Information  Admitted from[de-identified] Home  Mental Status: Alert  During Assessment patient was accompanied by: Not accompanied during assessment  Assessment information provided by[de-identified] Patient  Primary Caregiver: Self  Support Systems: Family members, Self, Spouse/significant other  [facility]: [address] [place] [address] do you live in?: [address] entry access options.  Select all that apply.: Stairs  Number of steps to enter home.: 2  Do the steps have railings?: Yes  Type of Current Residence: 3 story home  Upon entering residence, is there a bedroom on the main floor (no further steps)?: No  A bedroom is located on the following floor levels of residence (select all that apply):: 2nd Floor  Upon entering residence, is there a bathroom on the main floor (no further steps)?: No  Indicate which floors of current residence have a bathroom (select all the apply):: 2nd Floor, Basement  Number of steps to basement from main floor: One Flight  Number of steps to 2nd floor from main floor: One Flight  Living Arrangements: Lives w/ Friend  Is patient a ?: No    Activities of Daily Living Prior to Admission  Functional Status: Independent  Completes ADLs independently?: Yes  Ambulates independently?: Yes  Does patient use assisted devices?: No  Does patient currently own DME?: No  Does patient have a history of Outpatient Therapy (PT/OT)?: No  Does the patient have a history of Short-Term Rehab?: No  Does patient have a history of HHC?: No  Does patient currently have [facility]?: No      Patient Information Continued  Income Source: Pension/correction  Does patient have prescription coverage?: Yes  Does patient receive dialysis treatments?: No  Does patient have a history of substance abuse?: No  Does patient have a history of Mental Health Diagnosis?: No      Means of Transportation  Means of Transport to hospitals[de-identified] Family transport      Housing Stability: [address]  (12/12/2023)    Housing Stability Vital Sign     Unable to Pay for Housing in the Last Year: No     Number of [address] in the Last Year: 1     Unstable Housing in the Last Year: No   Food Insecurity: No Food Insecurity (12/12/2023)    Hunger Vital Sign     Worried About Running Out of Food in the Last Year: Never true     [address] in the Last Year: Never true   Transportation Needs: No Transportation Needs (12/12/2023)    PRAPARE - Transportation     Lack of Transportation (Medical): No     Lack of Transportation (Non-Medical): No   Utilities: Not At Risk (12/12/2023)    [facility] Utilities     Threatened with loss of utilities: No       DISCHARGE DETAILS:    Discharge planning discussed with[de-identified] Patient  Freedom of Choice: Yes     CM contacted family/caregiver?: No- see comments ([id number])      Other Referral/Resources/Interventions Provided:  Interventions: None Indicated    Would you like to participate in our [address] service program?  : No - Declined         Additional Comments: Met with patient at bedside. IPTA.   CM department following thru discharge

## 2023-12-12 NOTE — ASSESSMENT & PLAN NOTE
Incidental finding  F/U w/ PCP for imaging 6 months  CT: 1.6 cm cystic lesion pancreatic body. For simple cyst(s) between 1.5 to 2.0 cm, recommend followup every 6 months for 4 times, then every 1 year for 2 times, then every 2 years for 3 times. If stable after 10 years, then no more followups. (If patient reaches age 80, then can switch to age >80 algorithm.) Recommend next followup in 6 months.  Preferred imaging modality: abdomen MRI and MRCP with and without IV contrast, or triple phase abdomen CT with IV contrast.

## 2023-12-12 NOTE — PLAN OF CARE
Problem: PAIN - ADULT  Goal: Verbalizes/displays adequate comfort level or baseline comfort level  Description: Interventions:  - Encourage patient to monitor pain and request assistance  - Assess pain using appropriate pain scale  - Administer analgesics based on type and severity of pain and evaluate response  - Implement non-pharmacological measures as appropriate and evaluate response  - Consider cultural and social influences on pain and pain management  - Notify physician/advanced practitioner if interventions unsuccessful or patient reports new pain  Outcome: Progressing     Problem: INFECTION - ADULT  Goal: Absence or prevention of progression during hospitalization  Description: INTERVENTIONS:  - Assess and monitor for signs and symptoms of infection  - Monitor lab/diagnostic results  - Monitor all insertion sites, i.e. indwelling lines, tubes, and drains  - Monitor endotracheal if appropriate and nasal secretions for changes in amount and color  - [place] appropriate cooling/warming therapies per order  - Administer medications as ordered  - Instruct and encourage patient and family to use good hand hygiene technique  - Identify and instruct in appropriate isolation precautions for identified infection/condition  Outcome: Progressing     Problem: METABOLIC, FLUID AND ELECTROLYTES - ADULT  Goal: Electrolytes maintained within normal limits  Description: INTERVENTIONS:  - Monitor labs and assess patient for signs and symptoms of electrolyte imbalances  - Administer electrolyte replacement as ordered  - Monitor response to electrolyte replacements, including repeat lab results as appropriate  - Instruct patient on fluid and nutrition as appropriate  Outcome: Progressing     Problem: GASTROINTESTINAL - ADULT  Goal: Minimal or absence of nausea and/or vomiting  Description: INTERVENTIONS:  - Administer IV fluids if ordered to ensure adequate hydration  - Maintain NPO status until nausea and vomiting are resolved  - Nasogastric tube if ordered  - Administer ordered antiemetic medications as needed  - Provide nonpharmacologic comfort measures as appropriate  - Advance diet as tolerated, if ordered  - Consider nutrition services referral to assist patient with adequate nutrition and appropriate food choices  Outcome: Progressing     Problem: HEMATOLOGIC - ADULT  Goal: Maintains hematologic stability  Description: INTERVENTIONS  - Assess for signs and symptoms of bleeding or hemorrhage  - Monitor labs  - Administer supportive blood products/factors as ordered and appropriate  Outcome: Progressing     Problem: Knowledge Deficit  Goal: Patient/family/caregiver demonstrates understanding of disease process, treatment plan, medications, and discharge instructions  Description: Complete learning assessment and assess knowledge base.   Interventions:  - Provide teaching at level of understanding  - Provide teaching via preferred learning methods  Outcome: Progressing     Problem: DISCHARGE PLANNING  Goal: Discharge to home or other facility with appropriate resources  Description: INTERVENTIONS:  - Identify barriers to discharge w/patient and caregiver  - Arrange for needed discharge resources and transportation as appropriate  - Identify discharge learning needs (meds, wound care, etc.)  - Arrange for interpretive services to assist at discharge as needed  - Refer to Case Management Department for coordinating discharge planning if the patient needs post-hospital services based on physician/advanced practitioner order or complex needs related to functional status, cognitive ability, or social support system  Outcome: Progressing

## 2023-12-12 NOTE — ED PROVIDER NOTES
History  Chief Complaint   Patient presents with    Dizziness     Arrives via EMS reporting she woke up feeling dizzy with hot flashes and sweating. Collette Cho is a 80 y.o. female with a PMH of HTN and asthma presenting to the ED via EMS on December 12, 2023 with weakness and BRB per rectum. Patient states she woke up just prior to presentation because she needed to use the bathroom and felt very lightheaded and clammy. Her first bowel movement was normal, however she said she had to go again and this time she had blood in her stool that filled the whole bowl. She also felt some shortness of breath and used her inhaler. She also endorses generalized abdominal pain. Patient states she had a gastric ulcer before from taking ibuprofen, but never needed a transfusion. She is not on any blood thinners but takes aspirin daily. Patient denies chest pain, shortness of breath at the present time, urinary symptoms or cold symptoms. Dizziness  Associated symptoms: blood in stool, diarrhea, shortness of breath and weakness    Associated symptoms: no chest pain and no palpitations        Prior to Admission Medications   Prescriptions Last Dose Informant Patient Reported? Taking? Fluticasone-Salmeterol (Advair) 100-50 mcg/dose inhaler   No No   Sig: Inhale 1 puff 2 (two) times a day Rinse mouth after use.    albuterol (PROVENTIL HFA,VENTOLIN HFA) 90 mcg/act inhaler   No No   Sig: INHALE 1 PUFF BY MOUTH EVERY 6 HOURS   aspirin (ECOTRIN) 325 mg EC tablet  Self No No   Sig: Take 1 tablet (325 mg total) by mouth daily   cholecalciferol (VITAMIN D3) 1,000 units tablet   No No   Sig: TAKE 4 TABLETS BY MOUTH EVERY DAY   clindamycin (CLEOCIN T) 1 % lotion   No No   Sig: Apply topically 2 (two) times a day   lisinopril (ZESTRIL) 5 mg tablet   No No   Sig: TAKE 1 TABLET(5 MG) BY MOUTH DAILY   oxybutynin (DITROPAN) 5 mg tablet   No No   Sig: TAKE 1 TABLET(5 MG) BY MOUTH TWICE DAILY   pravastatin (PRAVACHOL) 10 mg tablet No No   Sig: TAKE 1 TABLET(10 MG) BY MOUTH DAILY AT BEDTIME      Facility-Administered Medications: None       Past Medical History:   Diagnosis Date    Anemia 2023    Asthma     Gout 2021    Hypertension 10/25/2023    Lateral epicondylitis 10/9/2015    Stage 3 chronic kidney disease, unspecified whether stage 3a or 3b CKD ([address]) 2022       Past Surgical History:   Procedure Laterality Date    IMPLANTATION VAGAL NERVE STIMULATOR         Family History   Problem Relation Age of Onset    No Known Problems Mother     Kidney disease Father     No Known Problems Sister     No Known Problems Sister     No Known Problems Sister     Cancer Paternal Uncle     No Known Problems Maternal Grandmother     No Known Problems Maternal Grandfather     No Known Problems Paternal Grandmother     No Known Problems Paternal Grandfather      I have reviewed and agree with the history as documented. E-Cigarette/Vaping    E-Cigarette Use Never User      E-Cigarette/Vaping Substances    Nicotine No     THC No     CBD No     Flavoring No     Other No     Unknown No      Social History     Tobacco Use    Smoking status: Former     Packs/day: 1.00     Types: Cigarettes     Quit date:      Years since quittin.9    Smokeless tobacco: Former    Tobacco comments:     exposure to passive smoke   Vaping Use    Vaping Use: Never used   Substance Use Topics    Alcohol use: No    Drug use: No       Review of Systems   Constitutional:  Positive for activity change, chills and fatigue. Negative for appetite change and fever. HENT:  Negative for congestion and sore throat. Respiratory:  Positive for shortness of breath. Negative for cough, chest tightness and wheezing. Cardiovascular:  Negative for chest pain and palpitations. Gastrointestinal:  Positive for abdominal pain, anal bleeding, blood in stool and diarrhea. Negative for constipation.    Genitourinary:  Negative for difficulty urinating, dysuria, frequency and urgency. Musculoskeletal:  Negative for arthralgias. Skin:  Positive for pallor. Negative for color change and rash. Neurological:  Positive for dizziness, weakness and light-headedness. Negative for seizures, syncope and numbness. Physical Exam  Physical Exam  Vitals and nursing note reviewed. Constitutional:       General: She is not in acute distress. Appearance: Normal appearance. She is normal weight. She is not ill-appearing, toxic-appearing or diaphoretic. HENT:      Head: Normocephalic and atraumatic. Right Ear: External ear normal.      Left Ear: External ear normal.      Nose: Nose normal. No congestion or rhinorrhea. Mouth/Throat:      Mouth: Mucous membranes are moist.   Eyes:      General: No scleral icterus. Right eye: No discharge. Left eye: No discharge. Extraocular Movements: Extraocular movements intact. Conjunctiva/sclera: Conjunctivae normal.   Cardiovascular:      Rate and Rhythm: Normal rate and regular rhythm. Heart sounds: Normal heart sounds. No murmur heard. No friction rub. No gallop. Pulmonary:      Effort: Pulmonary effort is normal. No respiratory distress. Breath sounds: Normal breath sounds. No wheezing, rhonchi or rales. Abdominal:      General: Abdomen is flat. Bowel sounds are normal. There is no distension. Palpations: Abdomen is soft. Tenderness: There is abdominal tenderness. There is no guarding or rebound. Genitourinary:     Rectum: Normal. Guaiac result positive. Musculoskeletal:         General: No signs of injury. Normal range of motion. Cervical back: Normal range of motion and neck supple. No rigidity. Skin:     General: Skin is warm. Capillary Refill: Capillary refill takes less than 2 seconds. Coloration: Skin is pale. Skin is not jaundiced. Findings: No bruising or erythema. Neurological:      General: No focal deficit present.       Mental Status: She is alert and oriented to person, place, and time. Mental status is at baseline. Motor: Weakness present.       Gait: Gait normal.   Psychiatric:         Mood and Affect: Mood normal.         Behavior: Behavior normal.         Vital Signs  ED Triage Vitals [12/12/23 0231]   Temperature Pulse Respirations Blood Pressure SpO2   98 °F (36.7 °C) 93 18 (!) 176/84 97 %      Temp Source Heart Rate Source Patient Position - Orthostatic VS BP Location FiO2 (%)   Tympanic Monitor Sitting Left arm --      Pain Score       --           Vitals:    12/12/23 0231 12/12/23 0421 12/12/23 0449 12/12/23 0505   BP: (!) 176/84 108/73 154/83 160/53   Pulse: 93 (!) 111 90 97   Patient Position - Orthostatic VS: Sitting Lying  Lying         Visual Acuity      ED Medications  Medications   pantoprazole (PROTONIX) injection 40 mg (has no administration in time range)   sodium chloride 0.9 % infusion (has no administration in time range)   acetaminophen (TYLENOL) tablet 650 mg (has no administration in time range)   ondansetron (ZOFRAN) injection 4 mg (has no administration in time range)   sodium chloride 0.9 % bolus 500 mL (0 mL Intravenous Stopped 12/12/23 0342)   cefTRIAXone (ROCEPHIN) IVPB (premix in dextrose) 1,000 mg 50 mL (0 mg Intravenous Stopped 12/12/23 0403)   pantoprazole (PROTONIX) injection 40 mg (40 mg Intravenous Given 12/12/23 0339)   iohexol (OMNIPAQUE) 350 MG/ML injection (SINGLE-DOSE) 100 mL (100 mL Intravenous Given 12/12/23 0416)       Diagnostic Studies  Results Reviewed       Procedure Component Value Units Date/Time    Occult blood 1-3, stool [[id number]]     Lab Status: No result Specimen: Stool     HS Troponin I 2hr [[id number]] Collected: 12/12/23 0459    Lab Status: No result Specimen: Blood from Arm, Right     HS Troponin I 4hr [[id number]]     Lab Status: No result Specimen: Blood     HS Troponin 0hr (reflex protocol) [[id number]]  (Normal) Collected: 12/12/23 0300    Lab Status: Final result Specimen: Blood from Arm, Right Updated: 12/12/23 0333     hs TnI 0hr 3 ng/L     Comprehensive metabolic panel [[id number]] Collected: 12/12/23 0300    Lab Status: Final result Specimen: Blood from Arm, Right Updated: 12/12/23 0328     Sodium 140 mmol/L      Potassium 3.7 mmol/L      Chloride 105 mmol/L      CO2 23 mmol/L      ANION GAP 12 mmol/L      BUN 15 mg/dL      Creatinine 0.94 mg/dL      Glucose 112 mg/dL      Calcium 9.4 mg/dL      AST 18 U/L      ALT 12 U/L      Alkaline Phosphatase 48 U/L      Total Protein 7.4 g/dL      Albumin 4.1 g/dL      Total Bilirubin 0.68 mg/dL      eGFR 62 ml/min/1.73sq m     Narrative:      [place] guidelines for Chronic Kidney Disease (CKD):     Stage 1 with normal or high GFR (GFR > 90 mL/min/1.73 square meters)    Stage 2 Mild CKD (GFR = 60-89 mL/min/1.73 square meters)    Stage 3A Moderate CKD (GFR = 45-59 mL/min/1.73 square meters)    Stage 3B Moderate CKD (GFR = 30-44 mL/min/1.73 square meters)    Stage 4 Severe CKD (GFR = 15-29 mL/min/1.73 square meters)    Stage 5 End Stage CKD (GFR <15 mL/min/1.73 square meters)  Note: GFR calculation is accurate only with a steady state creatinine    CBC and differential [[id number]]  (Abnormal) Collected: 12/12/23 0300    Lab Status: Final result Specimen: Blood from Arm, Right Updated: 12/12/23 0324     WBC 5.10 Thousand/uL      RBC 3.80 Million/uL      Hemoglobin 5.8 g/dL      Hematocrit 23.8 %      MCV 63 fL      MCH 15.3 pg      MCHC 24.4 g/dL      RDW 20.6 %      Platelets 545 Thousands/uL      nRBC 0 /100 WBCs      Neutrophils Relative 66 %      Immat GRANS % 0 %      Lymphocytes Relative 23 %      Monocytes Relative 7 %      Eosinophils Relative 3 %      Basophils Relative 1 %      Neutrophils Absolute 3.37 Thousands/µL      Immature Grans Absolute 0.02 Thousand/uL      Lymphocytes Absolute 1.17 Thousands/µL      Monocytes Absolute 0.34 Thousand/µL      Eosinophils Absolute 0.14 Thousand/µL      Basophils Absolute 0.06 Thousands/µL     Narrative: This is an appended report. These results have been appended to a previously verified report. Sloan Ríos [[id number]]  (Normal) Collected: 12/12/23 0300    Lab Status: Final result Specimen: Blood from Arm, Right Updated: 12/12/23 0324     Protime 13.9 seconds      INR 1.03    Fingerstick Glucose (POCT) [[id number]]  (Normal) Collected: 12/12/23 0228    Lab Status: Final result Updated: 12/12/23 0233     POC Glucose 119 mg/dl                    CT high volume bleeding scan abdomen pelvis   Final Result by Vita Traylor DO (12/12 1063)      No CT evidence of active high volume gastrointestinal hemorrhage. Colonic diverticulosis      1.6 cm cystic lesion pancreatic body. For simple cyst(s) between 1.5 to 2.0 cm, recommend followup every 6 months for 4 times, then every 1 year for 2 times, then every 2 years for 3 times. If stable after 10 years, then no more followups. (If patient    reaches age 80, then can switch to age >80 algorithm.) Recommend next followup in 6 months. Preferred imaging modality: abdomen MRI and MRCP with and without IV contrast, or triple phase abdomen CT with IV contrast, or abdomen MRI and MRCP without IV    contrast.                     The study was marked in EPIC for immediate notification. Workstation performed: [id number]                    Procedures  Procedures         ED Course  ED Course as of 12/12/23 0518   Tue Dec 12, 2023   0259 Fingerstick Glucose (POCT)  wnl   0311 Positive fecal occult blood, will order bleeding scan   0334 Hemoglobin(!!): 5.8  Will transfuse 2 units of blood, ordering bleeding scan   0448 CT shows diverticulosis                               SBIRT 20yo+      Flowsheet Row Most Recent Value   Initial Alcohol Screen: US AUDIT-C     1. How often do you have a drink containing alcohol? 0 Filed at: 12/12/2023 0225   2. How many drinks containing alcohol do you have on a typical day you are drinking?   0 Filed at: 12/12/2023 0225   3b. FEMALE Any Age, or MALE 65+: How often do you have 4 or more drinks on one occassion? 0 Filed at: 12/12/2023 0225   Audit-C Score 0 Filed at: 12/12/2023 0225   BLADIMIR: How many times in the past year have you. .. Used an illegal drug or used a prescription medication for non-medical reasons? Never Filed at: 12/12/2023 0225                      Medical Decision Making  Patient seen and examined noted to have GI bleed and diverticulosis. Differential diagnosis includes but is not limited to anemia, dehydration, lower GI bleed, upper GI bleed. Patient's labs notable for: hemoglobin of 5.8    Imaging revealed:   CT bleeding scan revealed diverticulosis    EKG: was interpreted by myself as above. Discussed patient's case with Nidhi Brown ([address] [place] [address]) regarding admission who accepted the patient for further evaluation and management to Dr. Mathew Gonzalez service. All labs reviewed and utilized in the medical decision making process (if labs were ordered). Portions of the record may have been created with voice recognition software. Occasional wrong word or "sound a like" substitutions may have occurred due to the inherent limitations of voice recognition software. Read the chart carefully and recognize, using context, where substitutions have occurred. Amount and/or Complexity of Data Reviewed  Labs: ordered. Decision-making details documented in ED Course. Radiology: ordered. Risk  Prescription drug management. Decision regarding hospitalization.              Disposition  Final diagnoses:   GI bleed   Dizziness   Low hemoglobin     Time reflects when diagnosis was documented in both MDM as applicable and the Disposition within this note       Time User Action Codes Description Comment    12/12/2023  4:59 AM Tiffany Godfrey Add [Z87.19] History of GI diverticular bleed     12/12/2023  4:59 AM Tiffany Godfrey Remove [Z87.19] History of GI diverticular bleed 12/12/2023  4:59 AM Cesar Milch Add [Z87.19] History of GI diverticular bleed     12/12/2023  4:59 AM Cesar Milch Remove [Z87.19] History of GI diverticular bleed     12/12/2023  4:59 AM Cesar Milch Add [K92.2] GI bleed     12/12/2023  5:00 AM Brianda Lund D Add [D64.9] Anemia     12/12/2023  5:05 AM Cesar Milch Add [R42] Dizziness     12/12/2023  5:05 AM Cesar Milch Add [D64.9] Low hemoglobin           ED Disposition       ED Disposition   Admit    Condition   Stable    Date/Time   Tue Dec 12, 2023 0500    Comment   Case was discussed with Brianda Lund and the patient's admission status was agreed to be Admission Status: observation status to the service of Dr. Dona Collins . Follow-up Information    None         Patient's Medications   Discharge Prescriptions    No medications on file       No discharge procedures on file.     PDMP Review       None            ED Provider  Electronically Signed by             Cesar Grewal PA-C  12/12/23 4606

## 2023-12-12 NOTE — H&P
[facility]  H&P  Name: Samira Weiner 80 y.o. female I MRN: [id number]  Unit/Bed#: ED 05 I Date of Admission: 12/12/2023   Date of Service: 12/12/2023 I Hospital Day: 0      Assessment/Plan   * Anemia  Assessment & Plan  Patient reports episode of bright red blood per rectum. ED ordered 2 units PRBC  GI consult  Occult stool  Iron Panel  CT: No CT evidence of active high volume gastrointestinal hemorrhage. Colonic diverticulosis     Pancreatic cyst  Assessment & Plan  Incidental finding  F/U w/ PCP for imaging 6 months  CT: 1.6 cm cystic lesion pancreatic body. For simple cyst(s) between 1.5 to 2.0 cm, recommend followup every 6 months for 4 times, then every 1 year for 2 times, then every 2 years for 3 times. If stable after 10 years, then no more followups. (If patient reaches age 80, then can switch to age >80 algorithm.) Recommend next followup in 6 months. Preferred imaging modality: abdomen MRI and MRCP with and without IV contrast, or triple phase abdomen CT with IV contrast.     Morbid obesity with BMI of 45.0-49.9, adult (HCC)  Assessment & Plan  BMI 39  Healthy diet and lifestyle modification recommended    Primary hypertension  Assessment & Plan  Continue lisinopril with hold parameters    Stage 3a chronic kidney disease [facility])  Assessment & Plan  Lab Results   Component Value Date    EGFR 62 12/12/2023    EGFR 65 11/07/2023    EGFR 55 02/06/2023    CREATININE 0.94 12/12/2023    CREATININE 0.91 11/07/2023    CREATININE 1.05 02/06/2023   Creat stable, monitor       VTE Pharmacologic Prophylaxis: VTE Score: 4 Moderate Risk (Score 3-4) - Pharmacological DVT Prophylaxis Contraindicated. Sequential Compression Devices Ordered.   Code Status: Full Code  Discussion with patient    Anticipated Length of Stay: Patient will be admitted on an observation basis with an anticipated length of stay of less than 2 midnights secondary to Blood transfusion, lab monitoring, specialist input. Total Time Spent on Date of Encounter in care of patient: 65 mins. This time was spent on one or more of the following: performing physical exam; counseling and coordination of care; obtaining or reviewing history; documenting in the medical record; reviewing/ordering tests, medications or procedures; communicating with other healthcare professionals and discussing with patient's family/caregivers. Chief Complaint: bright red blood per rectum    History of Present Illness:  See Torrez is a 80 y.o. female with a PMH of Morbid obesity, HTN, HLD, CKD3a,  who presents with bright red blood per rectum. Review of Systems:  Review of Systems   Constitutional:  Negative for chills and fever. HENT:  Negative for ear pain and sore throat. Eyes:  Negative for pain and visual disturbance. Respiratory:  Negative for cough and shortness of breath. Cardiovascular:  Negative for chest pain and palpitations. Gastrointestinal:  Negative for abdominal pain and vomiting. Genitourinary:  Negative for dysuria and hematuria. Musculoskeletal:  Negative for arthralgias and back pain. Skin:  Negative for color change and rash. Neurological:  Negative for seizures and syncope. All other systems reviewed and are negative. Past Medical and Surgical History:   Past Medical History:   Diagnosis Date    Anemia 12/12/2023    Asthma     Gout 02/13/2021    Hypertension 10/25/2023    Lateral epicondylitis 10/9/2015    Stage 3 chronic kidney disease, unspecified whether stage 3a or 3b CKD ([address]) 1/13/2022       Past Surgical History:   Procedure Laterality Date    IMPLANTATION VAGAL NERVE STIMULATOR         Meds/Allergies:  Prior to Admission medications    Medication Sig Start Date End Date Taking?  Authorizing Provider   albuterol (PROVENTIL HFA,VENTOLIN HFA) 90 mcg/act inhaler INHALE 1 PUFF BY MOUTH EVERY 6 HOURS 4/17/23   Betzy Abts, CRNP   aspirin (ECOTRIN) 325 mg EC tablet Take 1 tablet (325 mg total) by mouth daily 10/16/20   BRIE Mohamud   cholecalciferol (VITAMIN D3) 1,000 units tablet TAKE 4 TABLETS BY MOUTH EVERY DAY 23   BRIE Barnes   clindamycin (CLEOCIN T) 1 % lotion Apply topically 2 (two) times a day 3/2/22   BRIE Mohamud   Fluticasone-Salmeterol (Advair) 100-50 mcg/dose inhaler Inhale 1 puff 2 (two) times a day Rinse mouth after use. 23  BRIE Barnes   lisinopril (ZESTRIL) 5 mg tablet TAKE 1 TABLET(5 MG) BY MOUTH DAILY 23   BRIE Barnse   oxybutynin (DITROPAN) 5 mg tablet TAKE 1 TABLET(5 MG) BY MOUTH TWICE DAILY 23   BRIE Morris   pravastatin (PRAVACHOL) 10 mg tablet TAKE 1 TABLET(10 MG) BY MOUTH DAILY AT BEDTIME 23   BRIE Morris     I have reviewed home medications with patient personally. Allergies:    Allergies   Allergen Reactions    Lansoprazole Palpitations     Erythema/ tachycardia  Other reaction(s): tachycardia    Penicillins Itching and Rash     Other reaction(s): Rash  Other reaction(s): Rash       Social History:  Marital Status: Single   Patient Pre-hospital Living Situation: Home  Substance Use History:   Social History     Substance and Sexual Activity   Alcohol Use No     Social History     Tobacco Use   Smoking Status Former    Packs/day: 1.00    Types: Cigarettes    Quit date:     Years since quittin.9   Smokeless Tobacco Former   Tobacco Comments    exposure to passive smoke     Social History     Substance and Sexual Activity   Drug Use No       Family History:  Family History   Problem Relation Age of Onset    No Known Problems Mother     Kidney disease Father     No Known Problems Sister     No Known Problems Sister     No Known Problems Sister     Cancer Paternal Uncle     No Known Problems Maternal Grandmother     No Known Problems Maternal Grandfather     No Known Problems Paternal Grandmother     No Known Problems Paternal Grandfather        Physical Exam:     Vitals:   Blood Pressure: 160/53 (12/12/23 0505)  Pulse: 97 (just returned from oob) (12/12/23 0505)  Temperature: 97.8 °F (36.6 °C) (12/12/23 0505)  Temp Source: Tympanic (12/12/23 0505)  Respirations: 18 (12/12/23 0505)  Weight - Scale: 88.5 kg (195 lb) (12/12/23 0231)  SpO2: 97 % (12/12/23 0505)    Physical Exam  Vitals and nursing note reviewed. Constitutional:       General: She is not in acute distress. Appearance: She is well-developed. HENT:      Head: Normocephalic and atraumatic. Eyes:      Conjunctiva/sclera: Conjunctivae normal.   Cardiovascular:      Rate and Rhythm: Normal rate and regular rhythm. Heart sounds: No murmur heard. Pulmonary:      Effort: Pulmonary effort is normal. No respiratory distress. Breath sounds: Normal breath sounds. Abdominal:      Palpations: Abdomen is soft. Tenderness: There is no abdominal tenderness. Musculoskeletal:         General: No swelling. Cervical back: Neck supple. Skin:     General: Skin is warm and dry. Capillary Refill: Capillary refill takes less than 2 seconds. Neurological:      Mental Status: She is alert.    Psychiatric:         Mood and Affect: Mood normal.         Additional Data:     Lab Results:  Results from last 7 days   Lab Units 12/12/23  0300   WBC Thousand/uL 5.10   HEMOGLOBIN g/dL 5.8*   HEMATOCRIT % 23.8*   PLATELETS Thousands/uL 247   NEUTROS PCT % 66   LYMPHS PCT % 23   MONOS PCT % 7   EOS PCT % 3     Results from last 7 days   Lab Units 12/12/23  0300   SODIUM mmol/L 140   POTASSIUM mmol/L 3.7   CHLORIDE mmol/L 105   CO2 mmol/L 23   BUN mg/dL 15   CREATININE mg/dL 0.94   ANION GAP mmol/L 12   CALCIUM mg/dL 9.4   ALBUMIN g/dL 4.1   TOTAL BILIRUBIN mg/dL 0.68   ALK PHOS U/L 48   ALT U/L 12   AST U/L 18   GLUCOSE RANDOM mg/dL 112     Results from last 7 days   Lab Units 12/12/23  0300   INR  1.03     Results from last 7 days   Lab Units 12/12/23  0228   POC GLUCOSE mg/dl 119     Lines/Drains:  Invasive Devices       Peripheral Intravenous Line  Duration             Peripheral IV 12/12/23 Right Antecubital <1 day                        Imaging: Reviewed radiology reports from this admission including: abdominal/pelvic CT  CT high volume bleeding scan abdomen pelvis   Final Result by Francisco Javier Caraballo DO (12/12 0524)      No CT evidence of active high volume gastrointestinal hemorrhage. Colonic diverticulosis      1.6 cm cystic lesion pancreatic body. For simple cyst(s) between 1.5 to 2.0 cm, recommend followup every 6 months for 4 times, then every 1 year for 2 times, then every 2 years for 3 times. If stable after 10 years, then no more followups. (If patient    reaches age 81, then can switch to age >80 algorithm.) Recommend next followup in 6 months. Preferred imaging modality: abdomen MRI and MRCP with and without IV contrast, or triple phase abdomen CT with IV contrast, or abdomen MRI and MRCP without IV    contrast.                     The study was marked in EPIC for immediate notification. Workstation performed: [id number]             EKG and Other Studies Reviewed on Admission:   EKG: No EKG obtained. ** Please Note: This note has been constructed using a voice recognition system.  **

## 2023-12-12 NOTE — PLAN OF CARE
Problem: PAIN - ADULT  Goal: Verbalizes/displays adequate comfort level or baseline comfort level  Description: Interventions:  - Encourage patient to monitor pain and request assistance  - Assess pain using appropriate pain scale  - Administer analgesics based on type and severity of pain and evaluate response  - Implement non-pharmacological measures as appropriate and evaluate response  - Consider cultural and social influences on pain and pain management  - Notify physician/advanced practitioner if interventions unsuccessful or patient reports new pain  Outcome: Progressing     Problem: INFECTION - ADULT  Goal: Absence or prevention of progression during hospitalization  Description: INTERVENTIONS:  - Assess and monitor for signs and symptoms of infection  - Monitor lab/diagnostic results  - Monitor all insertion sites, i.e. indwelling lines, tubes, and drains  - Monitor endotracheal if appropriate and nasal secretions for changes in amount and color  - [place] appropriate cooling/warming therapies per order  - Administer medications as ordered  - Instruct and encourage patient and family to use good hand hygiene technique  - Identify and instruct in appropriate isolation precautions for identified infection/condition  Outcome: Progressing  Goal: Absence of fever/infection during neutropenic period  Description: INTERVENTIONS:  - Monitor WBC    Outcome: Progressing     Problem: SAFETY ADULT  Goal: Patient will remain free of falls  Description: INTERVENTIONS:  - Educate patient/family on patient safety including physical limitations  - Instruct patient to call for assistance with activity   - Consult OT/PT to assist with strengthening/mobility   - Keep Call bell within reach  - Keep bed low and locked with side rails adjusted as appropriate  - Keep care items and personal belongings within reach  - Initiate and maintain comfort rounds  - Make Fall Risk Sign visible to staff  - Offer Toileting every 2 Hours, in advance of need  - Apply yellow socks and bracelet for high fall risk patients  - Consider moving patient to room near nurses station  Outcome: Progressing  Goal: Maintain or return to baseline ADL function  Description: INTERVENTIONS:  -  Assess patient's ability to carry out ADLs; assess patient's baseline for ADL function and identify physical deficits which impact ability to perform ADLs (bathing, care of mouth/teeth, toileting, grooming, dressing, etc.)  - Assess/evaluate cause of self-care deficits   - Assess range of motion  - Assess patient's mobility; develop plan if impaired  - Assess patient's need for assistive devices and provide as appropriate  - Encourage maximum independence but intervene and supervise when necessary  - Involve family in performance of ADLs  - Assess for home care needs following discharge   - Consider OT consult to assist with ADL evaluation and planning for discharge  - Provide patient education as appropriate  Outcome: Progressing  Goal: Maintains/Returns to pre admission functional level  Description: INTERVENTIONS:  - Perform AM-PAC 6 Click Basic Mobility/ Daily Activity assessment daily.  - Set and communicate daily mobility goal to care team and patient/family/caregiver. - Collaborate with rehabilitation services on mobility goals if consulted  - Perform Range of Motion 2 times a day. - Reposition patient every 2 hours.   - Dangle patient 2 times a day  - Stand patient 2 times a day  - Ambulate patient 2 times a day  - Out of bed to chair 2 times a day   - Out of bed for meals 2 times a day  - Out of bed for toileting  - Record patient progress and toleration of activity level   Outcome: Progressing     Problem: DISCHARGE PLANNING  Goal: Discharge to home or other facility with appropriate resources  Description: INTERVENTIONS:  - Identify barriers to discharge w/patient and caregiver  - Arrange for needed discharge resources and transportation as appropriate  - Identify discharge learning needs (meds, wound care, etc.)  - Arrange for interpretive services to assist at discharge as needed  - Refer to Case Management Department for coordinating discharge planning if the patient needs post-hospital services based on physician/advanced practitioner order or complex needs related to functional status, cognitive ability, or social support system  Outcome: Progressing     Problem: Knowledge Deficit  Goal: Patient/family/caregiver demonstrates understanding of disease process, treatment plan, medications, and discharge instructions  Description: Complete learning assessment and assess knowledge base.   Interventions:  - Provide teaching at level of understanding  - Provide teaching via preferred learning methods  Outcome: Progressing     Problem: GASTROINTESTINAL - ADULT  Goal: Minimal or absence of nausea and/or vomiting  Description: INTERVENTIONS:  - Administer IV fluids if ordered to ensure adequate hydration  - Maintain NPO status until nausea and vomiting are resolved  - Nasogastric tube if ordered  - Administer ordered antiemetic medications as needed  - Provide nonpharmacologic comfort measures as appropriate  - Advance diet as tolerated, if ordered  - Consider nutrition services referral to assist patient with adequate nutrition and appropriate food choices  Outcome: Progressing  Goal: Maintains or returns to baseline bowel function  Description: INTERVENTIONS:  - Assess bowel function  - Encourage oral fluids to ensure adequate hydration  - Administer IV fluids if ordered to ensure adequate hydration  - Administer ordered medications as needed  - Encourage mobilization and activity  - Consider nutritional services referral to assist patient with adequate nutrition and appropriate food choices  Outcome: Progressing  Goal: Maintains adequate nutritional intake  Description: INTERVENTIONS:  - Monitor percentage of each meal consumed  - Identify factors contributing to decreased intake, treat as appropriate  - Assist with meals as needed  - Monitor I&O, weight, and lab values if indicated  - Obtain nutrition services referral as needed  Outcome: Progressing  Goal: Establish and maintain optimal ostomy function  Description: INTERVENTIONS:  - Assess bowel function  - Encourage oral fluids to ensure adequate hydration  - Administer IV fluids if ordered to ensure adequate hydration   - Administer ordered medications as needed  - Encourage mobilization and activity  - Nutrition services referral to assist patient with appropriate food choices  - Assess stoma site  - Consider wound care consult   Outcome: Progressing  Goal: Oral mucous membranes remain intact  Description: INTERVENTIONS  - Assess oral mucosa and hygiene practices  - Implement preventative oral hygiene regimen  - Implement oral medicated treatments as ordered  - Initiate Nutrition services referral as needed  Outcome: Progressing     Problem: METABOLIC, FLUID AND ELECTROLYTES - ADULT  Goal: Electrolytes maintained within normal limits  Description: INTERVENTIONS:  - Monitor labs and assess patient for signs and symptoms of electrolyte imbalances  - Administer electrolyte replacement as ordered  - Monitor response to electrolyte replacements, including repeat lab results as appropriate  - Instruct patient on fluid and nutrition as appropriate  Outcome: Progressing  Goal: Fluid balance maintained  Description: INTERVENTIONS:  - Monitor labs   - Monitor I/O and WT  - Instruct patient on fluid and nutrition as appropriate  - Assess for signs & symptoms of volume excess or deficit  Outcome: Progressing  Goal: Glucose maintained within target range  Description: INTERVENTIONS:  - Monitor Blood Glucose as ordered  - Assess for signs and symptoms of hyperglycemia and hypoglycemia  - Administer ordered medications to maintain glucose within target range  - Assess nutritional intake and initiate nutrition service referral as needed  Outcome: Progressing     Problem: HEMATOLOGIC - ADULT  Goal: Maintains hematologic stability  Description: INTERVENTIONS  - Assess for signs and symptoms of bleeding or hemorrhage  - Monitor labs  - Administer supportive blood products/factors as ordered and appropriate  Outcome: Progressing

## 2023-12-12 NOTE — ED NOTES
No apparent reaction to blood being transfused at this time. Patient offers no complaints.      Alex Andres RN  12/12/23 [id number]

## 2023-12-12 NOTE — Clinical Note
Case was discussed with Dr. Shanelle Young and the patient's admission status was agreed to be Admission Status: observation status to the service of Dr. Elenita Hardy .

## 2023-12-12 NOTE — INCIDENTAL FINDINGS
The following findings require follow up:  Radiographic finding   Finding: pancreatic cyst   Follow up required: MRI and MRCP w/ and w/o contrast   Follow up should be done within 6 month(s)    Please notify the following clinician to assist with the follow up:   BRIE Mauricio

## 2023-12-12 NOTE — CONSULTS
Patient MRN: [id number]  Date of Service: 12/12/2023  Referring Provider: Bethany Quiñonez PA-C  Provider Creating Note: Gricelda Daily PA-C  PCP: Drea Krishna    Reason for Consult: GI bleed, anemia    HISTORY OF PRESENT ILLNESS:  Ameena Castro is a 80 y.o. female with PMH including HTN, asthma, gout, CKD3, fecal incontinence s/p sacral nerve stimulator 2016 who presented to the ED this morning via EMS with hematochezia, weakness, SOB, lightheaded. She states she woke up to use the bathroom and felt weak/clammy. First BM was typical (brown/pasty), but subsequently passed bright red blood with liquids stool without clots which filled the bowl. She had no associated abdominal pain during this event. She reports similar episode last year, which resolved spontaneously and she did not seek medical care. She takes 325mg ASA daily; was on diclofenac several months ago for orthopedic pain but discontinued after a few days 2/2 abdominal discomfort. Has been on topical NSAID for arthritis pain. She is on daily PPI. She had one additional BM this morning described as normal (brown/pasty). In ED, was guaiac positive, hypertensive, tachycardic. INR 1.03. Hgb 5.8, MCV 63. Hemoglobin typically 13-14g range, 13.6 in 2/2023. She reported feeling diffuse abdominal discomfort in the ED. She received 1 of 2u pRBCs. CT bleeding scan showed diverticulosis but no evidence of high volume gi hemorrhage. Incidental 1.6cm cystic pancreatic body lesion also noted. She is NPO. Given OTD Rocephin and started on Protonix IV Q12H. Iron panel pending. Endoscopic history:  Colonoscopy 5/19/2016 Dr. Gwyn Duncan - suspicion for trauma to mid anal canal with scarring  Colonoscopy 3/31/2016 Dr. Gwyn Duncan - 2 rectal polyps removed  Review of Systems:    General:   No fever or chills; No significant weight loss or gain. EENT:   No ear pain, facial swelling; No sneezing, sore throat. Skin:   No rashes, color changes.    Respiratory:     +shortness of breath, +cough No wheezing, stridor. Cardiovascular:     No chest pain, palpitations. Gastrointestinal:    As per HPI. Musculoskeletal:     No arthralgias, myalgias, swelling. Neurologic:   No dizziness, numbness, weakness. No speech difficulties. Psych:   No agitation, suicidal ideations    Otherwise, All other twelve-point review of systems normal.     Past Medical History:   Diagnosis Date    Anemia 12/12/2023    Asthma     Gout 02/13/2021    Hypertension 10/25/2023    Lateral epicondylitis 10/9/2015    Stage 3 chronic kidney disease, unspecified whether stage 3a or 3b CKD ([address]) 1/13/2022     Past Surgical History:   Procedure Laterality Date    IMPLANTATION VAGAL NERVE STIMULATOR       Allergies   Allergen Reactions    Lansoprazole Palpitations     Erythema/ tachycardia  Other reaction(s): tachycardia    Penicillins Itching and Rash     Other reaction(s): Rash  Other reaction(s): Rash       Medications:  Home Medications  Prior to Admission medications    Medication Sig Start Date End Date Taking? Authorizing Provider   albuterol (PROVENTIL HFA,VENTOLIN HFA) 90 mcg/act inhaler INHALE 1 PUFF BY MOUTH EVERY 6 HOURS 4/17/23  Yes BRIE Morris   aspirin (ECOTRIN) 325 mg EC tablet Take 1 tablet (325 mg total) by mouth daily 10/16/20   BRIE Mohamud   cholecalciferol (VITAMIN D3) 1,000 units tablet TAKE 4 TABLETS BY MOUTH EVERY DAY 11/14/23   BRIE Barnes   clindamycin (CLEOCIN T) 1 % lotion Apply topically 2 (two) times a day 3/2/22   BRIE Mohamud   Fluticasone-Salmeterol (Advair) 100-50 mcg/dose inhaler Inhale 1 puff 2 (two) times a day Rinse mouth after use.  12/5/23 6/2/24  BRIE Barnes   lisinopril (ZESTRIL) 5 mg tablet TAKE 1 TABLET(5 MG) BY MOUTH DAILY 11/27/23   BRIE Barnes   oxybutynin (DITROPAN) 5 mg tablet TAKE 1 TABLET(5 MG) BY MOUTH TWICE DAILY 9/25/23   BRIE Morris   pravastatin (PRAVACHOL) 10 mg tablet TAKE 1 TABLET(10 MG) BY MOUTH DAILY AT BEDTIME 9/25/23   BRIE Pendleton       Inhouse Medications    Current Facility-Administered Medications:     acetaminophen (TYLENOL) tablet 650 mg, 650 mg, Oral, Q6H PRN    Fluticasone Furoate-Vilanterol 100-25 mcg/actuation 1 puff, 1 puff, Inhalation, Daily, 1 puff at 12/12/23 0800    lisinopril (ZESTRIL) tablet 5 mg, 5 mg, Oral, Daily, 5 mg at 12/12/23 0800    ondansetron (ZOFRAN) injection 4 mg, 4 mg, Intravenous, Q6H PRN    pantoprazole (PROTONIX) injection 40 mg, 40 mg, Intravenous, Q12H GABRIEL    sodium chloride 0.9 % infusion, 75 mL/hr, Intravenous, Continuous      Social History   reports that she quit smoking about 8 years ago. Her smoking use included cigarettes. She smoked an average of 1 pack per day. She has quit using smokeless tobacco. She reports that she does not drink alcohol and does not use drugs. Family History  Family History   Problem Relation Age of Onset    No Known Problems Mother     Kidney disease Father     No Known Problems Sister     No Known Problems Sister     No Known Problems Sister     Cancer Paternal Uncle     No Known Problems Maternal Grandmother     No Known Problems Maternal Grandfather     No Known Problems Paternal Grandmother     No Known Problems Paternal Grandfather          OBJECTIVE:    /83 (BP Location: Left arm)   Pulse 68   Temp (!) 97.1 °F (36.2 °C) (Temporal)   Resp 18   Ht 4' 7" (1.397 m)   Wt 88 kg (194 lb 0.1 oz)   SpO2 97%   BMI 45.09 kg/m²   Physical Exam:     General Appearance:    Awake, alert, oriented x3, no distress, well developed, well    Nourished.  Sitting OOB in chair/nontoxic appearing   Head:    Normocephalic without obvious abnormality   Eyes:    PERRL, conjunctiva/corneas clear, EOM's intact        Neck:   Supple, no adenopathy   Throat:   Mucous membranes moist   Lungs:     Clear to auscultation bilaterally, no wheezing or rhonchi   Heart:    Regular rate and rhythm, S1 and S2 normal, no murmur   Abdomen:     Soft, obese, non-tender, non-distended. bowel sounds active. No masses, rebound or guarding. Extremities:   Extremities normal. No clubbing, cyanosis or edema   Psych  Derm:   Normal affect    No jaundice   Neurologic:   CNII-XII grossly intact. Speech intact         Laboratory Studies:  Results from last 7 days   Lab Units 12/12/23  0300   WBC Thousand/uL 5.10   HEMOGLOBIN g/dL 5.8*   HEMATOCRIT % 23.8*   MCV fL 63*   PLATELETS Thousands/uL 247   INR  1.03     Results from last 7 days   Lab Units 12/12/23  0300   SODIUM mmol/L 140   POTASSIUM mmol/L 3.7   CHLORIDE mmol/L 105   CO2 mmol/L 23   BUN mg/dL 15   CREATININE mg/dL 0.94   CALCIUM mg/dL 9.4   ALBUMIN g/dL 4.1   TOTAL BILIRUBIN mg/dL 0.68   ALK PHOS U/L 48   ALT U/L 12   AST U/L 18           Imaging and Other Studies:  CT high volume bleeding scan abdomen pelvis    Result Date: 12/12/2023  Narrative: CT ABDOMEN AND PELVIS - WITHOUT AND WITH IV CONTRAST INDICATION:   BRB per rectum. COMPARISON: None. TECHNIQUE:  CT examination of the abdomen and pelvis was performed both prior to and after the administration of intravenous contrast. Multiplanar 2D reformatted images were created from the source data. Radiation dose length product (DLP) for this visit:  7759 mGy-cm . This examination, like all CT scans performed in the [facility], was performed utilizing techniques to minimize radiation dose exposure, including the use of iterative reconstruction and automated exposure control. IV Contrast:  100 mL of iohexol (OMNIPAQUE) Enteric Contrast:  Enteric contrast was not administered. FINDINGS: ABDOMEN BOWEL:There is no active extravasation of intravenous contrast into the lumen of stomach, small bowel, or large bowel loops. There is no abnormal bowel wall thickening or pathologic bowel wall enhancement. Colonic diverticulosis. Age-appropriate atherosclerotic disease in the mesenteric vessels, without complete occlusion.  LOWER CHEST:  No clinically significant abnormality identified in the visualized lower chest. LIVER/BILIARY TREE:  Unremarkable. GALLBLADDER:  No calcified gallstones. No pericholecystic inflammatory change. SPLEEN:  Unremarkable. PANCREAS: 1.6 cm cystic lesion pancreatic body (series 10, image 68). ADRENAL GLANDS:  Unremarkable. KIDNEYS/URETERS:  No hydronephrosis or urinary tract calculus. One or more sharply circumscribed subcentimeter renal hypodensities are present, too small to accurately characterize, and statistically most likely benign findings. According to recent literature (Radiology 2019) no further workup of these findings is recommended. APPENDIX:  A normal appendix was visualized. ABDOMINOPELVIC CAVITY:  No ascites. No pneumoperitoneum. No lymphadenopathy. VESSELS:  Unremarkable for patient's age. PELVIS REPRODUCTIVE ORGANS:  Unremarkable for patient's age. URINARY BLADDER:  Unremarkable. ABDOMINAL WALL/INGUINAL REGIONS: Nerve stimulator device in the right flank subcutaneous tissues. OSSEOUS STRUCTURES:  No acute fracture or destructive osseous lesion. Spinal degenerative changes are noted. Impression: No CT evidence of active high volume gastrointestinal hemorrhage. Colonic diverticulosis 1.6 cm cystic lesion pancreatic body. For simple cyst(s) between 1.5 to 2.0 cm, recommend followup every 6 months for 4 times, then every 1 year for 2 times, then every 2 years for 3 times. If stable after 10 years, then no more followups. (If patient reaches age 80, then can switch to age >80 algorithm.) Recommend next followup in 6 months. Preferred imaging modality: abdomen MRI and MRCP with and without IV contrast, or triple phase abdomen CT with IV contrast, or abdomen MRI and MRCP without IV contrast. The study was marked in EPIC for immediate notification. Workstation performed: [id number]         ASSESSMENT AND PLAN:  80year old admitted this morning with hematochezia, bleeding appears to have stopped.  CT bleed scan showed colonic diverticulosis but no evidence of high volume gi hemorrhage. Suspect lower source such as diverticular bleed, DDx: ischemia, AVM, ugi source, polyp, malignancy. Hemodynamically stable. BUN:Cr normal making UGI source less likely. Started on PPI IV Q12H. Avoid NSAIDs. NPO currently. Will discuss need for endoscopy with GI attending. Acute anemia 2/2 GI bleed. However microcytic indices indicate a more chronic problem. Iron panel pending. Hemoglobin 13 several months ago. She does have history of chronic kidney disease which may be contributing but endoscopy recommended to further evaluate her anemia. Status post 2u pRBCs. Continue to monitor h/h, transfuse as needed. Will plan for EGD, colonoscopy tomorrow. CLD today, NPO after MN. 1.6cm cystic pancreatic body lesion. Will need interval imaging in 6 months.            Leo Jones PA-C

## 2023-12-12 NOTE — ASSESSMENT & PLAN NOTE
Lab Results   Component Value Date    EGFR 62 12/12/2023    EGFR 65 11/07/2023    EGFR 55 02/06/2023    CREATININE 0.94 12/12/2023    CREATININE 0.91 11/07/2023    CREATININE 1.05 02/06/2023   Creat stable, monitor

## 2023-12-12 NOTE — ASSESSMENT & PLAN NOTE
Patient reports episode of bright red blood per rectum. ED ordered 2 units PRBC  GI consult  Occult stool  Iron Panel  CT: No CT evidence of active high volume gastrointestinal hemorrhage.  Colonic diverticulosis

## 2023-12-13 ENCOUNTER — APPOINTMENT (INPATIENT)
Dept: GASTROENTEROLOGY | Facility: HOSPITAL | Age: 68
DRG: 378 | End: 2023-12-13
Payer: COMMERCIAL

## 2023-12-13 ENCOUNTER — ANESTHESIA EVENT (INPATIENT)
Dept: GASTROENTEROLOGY | Facility: HOSPITAL | Age: 68
DRG: 378 | End: 2023-12-13
Payer: COMMERCIAL

## 2023-12-13 ENCOUNTER — ANESTHESIA (INPATIENT)
Dept: GASTROENTEROLOGY | Facility: HOSPITAL | Age: 68
DRG: 378 | End: 2023-12-13
Payer: COMMERCIAL

## 2023-12-13 VITALS
HEIGHT: 55 IN | DIASTOLIC BLOOD PRESSURE: 72 MMHG | SYSTOLIC BLOOD PRESSURE: 134 MMHG | WEIGHT: 194 LBS | TEMPERATURE: 98 F | OXYGEN SATURATION: 98 % | RESPIRATION RATE: 18 BRPM | BODY MASS INDEX: 44.9 KG/M2 | HEART RATE: 76 BPM

## 2023-12-13 LAB
ABO GROUP BLD BPU: NORMAL
ABO GROUP BLD BPU: NORMAL
ANION GAP SERPL CALCULATED.3IONS-SCNC: 11 MMOL/L
BASOPHILS # BLD AUTO: 0.05 THOUSANDS/ÂΜL (ref 0–0.1)
BASOPHILS NFR BLD AUTO: 1 % (ref 0–1)
BPU ID: NORMAL
BPU ID: NORMAL
BUN SERPL-MCNC: 10 MG/DL (ref 5–25)
CALCIUM SERPL-MCNC: 9.1 MG/DL (ref 8.4–10.2)
CHLORIDE SERPL-SCNC: 105 MMOL/L (ref 96–108)
CO2 SERPL-SCNC: 26 MMOL/L (ref 21–32)
CREAT SERPL-MCNC: 0.89 MG/DL (ref 0.6–1.3)
CROSSMATCH: NORMAL
CROSSMATCH: NORMAL
EOSINOPHIL # BLD AUTO: 0.16 THOUSAND/ÂΜL (ref 0–0.61)
EOSINOPHIL NFR BLD AUTO: 3 % (ref 0–6)
ERYTHROCYTE [DISTWIDTH] IN BLOOD BY AUTOMATED COUNT: 22.5 % (ref 11.6–15.1)
GFR SERPL CREATININE-BSD FRML MDRD: 67 ML/MIN/1.73SQ M
GLUCOSE P FAST SERPL-MCNC: 108 MG/DL (ref 65–99)
GLUCOSE SERPL-MCNC: 108 MG/DL (ref 65–140)
HCT VFR BLD AUTO: 30.6 % (ref 34.8–46.1)
HGB BLD-MCNC: 8.2 G/DL (ref 11.5–15.4)
IMM GRANULOCYTES # BLD AUTO: 0.03 THOUSAND/UL (ref 0–0.2)
IMM GRANULOCYTES NFR BLD AUTO: 1 % (ref 0–2)
LYMPHOCYTES # BLD AUTO: 1.28 THOUSANDS/ÂΜL (ref 0.6–4.47)
LYMPHOCYTES NFR BLD AUTO: 23 % (ref 14–44)
MAGNESIUM SERPL-MCNC: 1.4 MG/DL (ref 1.9–2.7)
MCH RBC QN AUTO: 17.8 PG (ref 26.8–34.3)
MCHC RBC AUTO-ENTMCNC: 26.8 G/DL (ref 31.4–37.4)
MCV RBC AUTO: 66 FL (ref 82–98)
MONOCYTES # BLD AUTO: 0.38 THOUSAND/ÂΜL (ref 0.17–1.22)
MONOCYTES NFR BLD AUTO: 7 % (ref 4–12)
NEUTROPHILS # BLD AUTO: 3.79 THOUSANDS/ÂΜL (ref 1.85–7.62)
NEUTS SEG NFR BLD AUTO: 65 % (ref 43–75)
NRBC BLD AUTO-RTO: 0 /100 WBCS
PHOSPHATE SERPL-MCNC: 3.9 MG/DL (ref 2.3–4.1)
PLATELET # BLD AUTO: 240 THOUSANDS/UL (ref 149–390)
POTASSIUM SERPL-SCNC: 3.5 MMOL/L (ref 3.5–5.3)
RBC # BLD AUTO: 4.61 MILLION/UL (ref 3.81–5.12)
SODIUM SERPL-SCNC: 142 MMOL/L (ref 135–147)
UNIT DISPENSE STATUS: NORMAL
UNIT DISPENSE STATUS: NORMAL
UNIT PRODUCT CODE: NORMAL
UNIT PRODUCT CODE: NORMAL
UNIT PRODUCT VOLUME: 350 ML
UNIT PRODUCT VOLUME: 350 ML
UNIT RH: NORMAL
UNIT RH: NORMAL
WBC # BLD AUTO: 5.69 THOUSAND/UL (ref 4.31–10.16)

## 2023-12-13 PROCEDURE — 0DJD8ZZ INSPECTION OF LOWER INTESTINAL TRACT, VIA NATURAL OR ARTIFICIAL OPENING ENDOSCOPIC: ICD-10-PCS | Performed by: INTERNAL MEDICINE

## 2023-12-13 PROCEDURE — 84100 ASSAY OF PHOSPHORUS: CPT | Performed by: INTERNAL MEDICINE

## 2023-12-13 PROCEDURE — 99239 HOSP IP/OBS DSCHRG MGMT >30: CPT | Performed by: INTERNAL MEDICINE

## 2023-12-13 PROCEDURE — 88313 SPECIAL STAINS GROUP 2: CPT | Performed by: PATHOLOGY

## 2023-12-13 PROCEDURE — 85025 COMPLETE CBC W/AUTO DIFF WBC: CPT | Performed by: INTERNAL MEDICINE

## 2023-12-13 PROCEDURE — C9113 INJ PANTOPRAZOLE SODIUM, VIA: HCPCS | Performed by: PHYSICIAN ASSISTANT

## 2023-12-13 PROCEDURE — 88305 TISSUE EXAM BY PATHOLOGIST: CPT | Performed by: PATHOLOGY

## 2023-12-13 PROCEDURE — 83735 ASSAY OF MAGNESIUM: CPT | Performed by: INTERNAL MEDICINE

## 2023-12-13 PROCEDURE — 0DB38ZX EXCISION OF LOWER ESOPHAGUS, VIA NATURAL OR ARTIFICIAL OPENING ENDOSCOPIC, DIAGNOSTIC: ICD-10-PCS | Performed by: INTERNAL MEDICINE

## 2023-12-13 PROCEDURE — 80048 BASIC METABOLIC PNL TOTAL CA: CPT | Performed by: INTERNAL MEDICINE

## 2023-12-13 RX ORDER — PROPOFOL 10 MG/ML
INJECTION, EMULSION INTRAVENOUS AS NEEDED
Status: DISCONTINUED | OUTPATIENT
Start: 2023-12-13 | End: 2023-12-13

## 2023-12-13 RX ORDER — SODIUM CHLORIDE, SODIUM LACTATE, POTASSIUM CHLORIDE, CALCIUM CHLORIDE 600; 310; 30; 20 MG/100ML; MG/100ML; MG/100ML; MG/100ML
100 INJECTION, SOLUTION INTRAVENOUS CONTINUOUS
Status: DISCONTINUED | OUTPATIENT
Start: 2023-12-13 | End: 2023-12-13

## 2023-12-13 RX ORDER — LIDOCAINE HYDROCHLORIDE 20 MG/ML
INJECTION, SOLUTION EPIDURAL; INFILTRATION; INTRACAUDAL; PERINEURAL AS NEEDED
Status: DISCONTINUED | OUTPATIENT
Start: 2023-12-13 | End: 2023-12-13

## 2023-12-13 RX ORDER — FERROUS SULFATE 324(65)MG
324 TABLET, DELAYED RELEASE (ENTERIC COATED) ORAL
Qty: 60 TABLET | Refills: 3 | Status: SHIPPED | OUTPATIENT
Start: 2023-12-13

## 2023-12-13 RX ADMIN — FLUTICASONE FUROATE AND VILANTEROL TRIFENATATE 1 PUFF: 100; 25 POWDER RESPIRATORY (INHALATION) at 08:18

## 2023-12-13 RX ADMIN — PROPOFOL 50 MG: 10 INJECTION, EMULSION INTRAVENOUS at 10:53

## 2023-12-13 RX ADMIN — SIMETHICONE 40 MG: 20 EMULSION ORAL at 11:07

## 2023-12-13 RX ADMIN — SODIUM CHLORIDE, SODIUM LACTATE, POTASSIUM CHLORIDE, AND CALCIUM CHLORIDE 100 ML/HR: .6; .31; .03; .02 INJECTION, SOLUTION INTRAVENOUS at 10:06

## 2023-12-13 RX ADMIN — LISINOPRIL 5 MG: 5 TABLET ORAL at 08:17

## 2023-12-13 RX ADMIN — PROPOFOL 60 MG: 10 INJECTION, EMULSION INTRAVENOUS at 10:56

## 2023-12-13 RX ADMIN — PANTOPRAZOLE SODIUM 40 MG: 40 INJECTION, POWDER, FOR SOLUTION INTRAVENOUS at 08:17

## 2023-12-13 RX ADMIN — PROPOFOL 50 MG: 10 INJECTION, EMULSION INTRAVENOUS at 10:50

## 2023-12-13 RX ADMIN — PROPOFOL 50 MG: 10 INJECTION, EMULSION INTRAVENOUS at 10:48

## 2023-12-13 RX ADMIN — IRON SUCROSE 200 MG: 20 INJECTION, SOLUTION INTRAVENOUS at 11:54

## 2023-12-13 RX ADMIN — LIDOCAINE HYDROCHLORIDE 100 MG: 20 INJECTION, SOLUTION EPIDURAL; INFILTRATION; INTRACAUDAL; PERINEURAL at 10:48

## 2023-12-13 NOTE — PROGRESS NOTES
Patient Name: Jacqueline Ny  Patient MRN: [id number]  Date: 12/13/23  Service: [facility]    Subjective   Completed bowel prep, yellow liquid output noted in toilet. No current complaints. Had some perianal irritation/blood streaked tp with wiping but no hematochezia. NPO since MN. Vitals stable. Vitals  Blood pressure 113/61, pulse 70, temperature 97.7 °F (36.5 °C), temperature source Temporal, resp. rate 18, height 4' 7" (1.397 m), weight 88 kg (194 lb 0.1 oz), SpO2 95%. Physical Exam:     General Appearance:    Awake, alert, oriented x3, no distress, well developed, well    nourished   Head:    Normocephalic without obvious abnormality   Eyes:    PERRL, conjunctiva/corneas clear, EOM's intact        Neck:   Supple, no adenopathy   Throat:   Mucous membranes moist   Lungs:     Clear to auscultation bilaterally, no wheezing or rhonchi   Heart:    Regular rate and rhythm, S1 and S2 normal, no murmur   Abdomen:     Soft, obese, non-tender, non-distended. bowel sounds active. No masses, rebound or guarding. Extremities:   Extremities without edema   Psych  Derm:   Normal affect    No jaundice   Neurologic:   CNII-XII grossly intact.  Speech intact         Laboratory Studies  Results from last 7 days   Lab Units 12/13/23  0528 12/12/23  0300   WBC Thousand/uL 5.69 5.10   HEMOGLOBIN g/dL 8.2* 5.8*   HEMATOCRIT % 30.6* 23.8*   PLATELETS Thousands/uL 240 247   INR   --  1.03     Results from last 7 days   Lab Units 12/13/23  0528 12/12/23  0300   SODIUM mmol/L 142 140   POTASSIUM mmol/L 3.5 3.7   CHLORIDE mmol/L 105 105   CO2 mmol/L 26 23   BUN mg/dL 10 15   CREATININE mg/dL 0.89 0.94   CALCIUM mg/dL 9.1 9.4   ALBUMIN g/dL  --  4.1   TOTAL BILIRUBIN mg/dL  --  0.68   ALK PHOS U/L  --  48   ALT U/L  --  12   AST U/L  --  18     Iron 10, iron sat 1%, tibc 758, ferritin 2    Imaging and Other Studies      Inhouse Medications     Current Facility-Administered Medications:     acetaminophen (TYLENOL) tablet 650 mg, 650 mg, Oral, Q6H PRN    Fluticasone Furoate-Vilanterol 100-25 mcg/actuation 1 puff, 1 puff, Inhalation, Daily, 1 puff at 12/12/23 0800    lisinopril (ZESTRIL) tablet 5 mg, 5 mg, Oral, Daily, 5 mg at 12/12/23 0800    ondansetron (ZOFRAN) injection 4 mg, 4 mg, Intravenous, Q6H PRN    pantoprazole (PROTONIX) injection 40 mg, 40 mg, Intravenous, Q12H GABRIEL, 40 mg at 12/12/23 2054    polyethylene glycol (GOLYTELY) bowel prep 4,000 mL, 4,000 mL, Oral, See Admin Instructions, 4,000 mL at 12/12/23 1625      Assessment/Plan:    Hematochezia, bleeding resolved. CT bleed scan showed colonic diverticulosis but no evidence of high volume gi hemorrhage. Suspect diverticular bleed. Remains hemodynamically stable. Continue PPI IV Q12H. Avoid NSAIDs. NPO currently. EGD/colonoscopy this afternoon. Acute anemia 2/2 GI bleed. However microcytic indices/iron deficiency indicate a more chronic problem. EGD/colonoscopy planned for later today. Status post 2u pRBCs. Continue to monitor h/h, transfuse as needed. Recommend IV Iron. 1.6cm cystic pancreatic body lesion. Will need interval imaging in 6 months.           Rj Buckner PA-C

## 2023-12-13 NOTE — ANESTHESIA PREPROCEDURE EVALUATION
Hgb 8.2    Medical History    History Comments   Asthma    Lateral epicondylitis    Gout    Stage 3 chronic kidney disease, unspecified whether stage 3a or 3b CKD ([address])    Hypertension    Anemia    Procedure:  COLONOSCOPY  EGD    Relevant Problems   ANESTHESIA (within normal limits)      CARDIO   (+) Hyperlipidemia   (+) Primary hypertension      GI/HEPATIC   (+) Pancreatic cyst      /RENAL   (+) Stage 3a chronic kidney disease (HCC)      HEMATOLOGY   (+) Anemia      MUSCULOSKELETAL   (+) Lateral epicondylitis      PULMONARY   (+) Dyspnea        Physical Exam    Airway    Mallampati score: III  TM Distance: >3 FB  Neck ROM: full     Dental    upper dentures and lower dentures    Cardiovascular  Rate: normal    Pulmonary  Pulmonary exam normal     Other Findings  post-pubertal.      Anesthesia Plan  ASA Score- 3     Anesthesia Type- IV sedation with anesthesia with ASA Monitors. Additional Monitors:     Airway Plan:     Comment: Per patient, appropriately NPO, denies active CP/SOB/wheezing/symptoms related to heartburn/nausea/vomiting  . Plan Factors-Exercise tolerance (METS): >4 METS. Chart reviewed. Patient summary reviewed. Patient is not a current smoker. Induction- intravenous. Postoperative Plan-     Informed Consent- Anesthetic plan and risks discussed with patient. I personally reviewed this patient with the CRNA. Discussed and agreed on the Anesthesia Plan with the CRNA. Trinity Porter

## 2023-12-13 NOTE — ANESTHESIA POSTPROCEDURE EVALUATION
Post-Op Assessment Note    CV Status:  Stable  Pain Score: 0    Pain management: adequate       Mental Status:  Alert   Hydration Status:  Stable   PONV Controlled:  Controlled   Airway Patency:  Patent     Post Op Vitals Reviewed: Yes      Staff: CRNA               BP   118/68   Temp      Pulse  78   Resp   20   SpO2   98

## 2023-12-13 NOTE — PROGRESS NOTES
[facility]  Progress Note  Name: Tres Weber  MRN: [id number]  Unit/Bed#: 7T [facility] 704-01 I Date of Admission: 12/12/2023   Date of Service: 12/13/2023  Hospital Day: 0    Assessment/Plan   * Anemia  Assessment & Plan  Patient reports episode of bright red blood per rectum  Presented with Hgb 5.8  S/P 2 units of pRBC  GI consult  Occult stool  Iron Panel with severe iron deficiency  Patient to undergo endoscopic evaluation  Venofer 200 mg daily    Pancreatic cyst  Assessment & Plan  Incidental finding  F/U w/ PCP for imaging 6 months  CT: 1.6 cm cystic lesion pancreatic body. For simple cyst(s) between 1.5 to 2.0 cm, recommend followup every 6 months for 4 times, then every 1 year for 2 times, then every 2 years for 3 times. If stable after 10 years, then no more followups. (If patient reaches age 80, then can switch to age >80 algorithm.) Recommend next followup in 6 months. Preferred imaging modality: abdomen MRI and MRCP with and without IV contrast, or triple phase abdomen CT with IV contrast.     Morbid obesity with BMI of 45.0-49.9, adult (HCC)  Assessment & Plan  BMI 39  Healthy diet and lifestyle modification recommended    Primary hypertension  Assessment & Plan  Continue lisinopril with hold parameters    Stage 3a chronic kidney disease [facility])  Assessment & Plan  Lab Results   Component Value Date    EGFR 67 12/13/2023    EGFR 62 12/12/2023    EGFR 65 11/07/2023    CREATININE 0.89 12/13/2023    CREATININE 0.94 12/12/2023    CREATININE 0.91 11/07/2023     Creatinine stable  Trend BMP           VTE Pharmacologic Prophylaxis: VTE Score: 4  contraindicated secondary to lower GI bleed    Mobility:   Basic Mobility Inpatient Raw Score: 24  JH-HLM Goal: 8: Walk 250 feet or more  JH-HLM Achieved: 8: Walk 250 feet ot more  HLM Goal achieved. Continue to encourage appropriate mobility. Patient Centered Rounds: I performed bedside rounds with nursing staff today.    Discussions with Specialists or Other Care Team Provider: Gastroenterology    Education and Discussions with Family / Patient: Patient declined call to . Total Time Spent on Date of Encounter in care of patient: 35 mins. This time was spent on one or more of the following: performing physical exam; counseling and coordination of care; obtaining or reviewing history; documenting in the medical record; reviewing/ordering tests, medications or procedures; communicating with other healthcare professionals and discussing with patient's family/caregivers. Current Length of Stay: 0 day(s)  Current Patient Status: Inpatient   Certification Statement: The patient will continue to require additional inpatient hospital stay due to lower GI bleed  Discharge Plan: Anticipate discharge tomorrow to home. Code Status: Level 1 - Full Code    Subjective:   Patient seen and examined at bedside. No acute events overnight. Denies chest pain, SOB, diaphoresis, nausea/vomiting/diarrhea, fevers/chills. Objective:     Vitals:   Temp (24hrs), Av.3 °F (36.3 °C), Min:97 °F (36.1 °C), Max:97.7 °F (36.5 °C)    Temp:  [97 °F (36.1 °C)-97.7 °F (36.5 °C)] 97.7 °F (36.5 °C)  HR:  [70-92] 70  Resp:  [18-19] 18  BP: (113-175)/(61-83) 113/61  SpO2:  [95 %-100 %] 95 %  Body mass index is 45.09 kg/m². Input and Output Summary (last 24 hours): Intake/Output Summary (Last 24 hours) at 2023 9456  Last data filed at 2023 2200  Gross per 24 hour   Intake 4320 ml   Output --   Net 4320 ml       Physical Exam:   Physical Exam  Vitals and nursing note reviewed. Constitutional:       General: She is not in acute distress. Appearance: She is well-developed. HENT:      Head: Normocephalic and atraumatic. Eyes:      Conjunctiva/sclera: Conjunctivae normal.   Cardiovascular:      Rate and Rhythm: Normal rate and regular rhythm. Heart sounds: No murmur heard.   Pulmonary:      Effort: Pulmonary effort is normal. No respiratory distress. Breath sounds: Normal breath sounds. Abdominal:      Palpations: Abdomen is soft. Tenderness: There is no abdominal tenderness. Musculoskeletal:         General: No swelling. Cervical back: Neck supple. Skin:     General: Skin is warm and dry. Capillary Refill: Capillary refill takes less than 2 seconds. Neurological:      Mental Status: She is alert. Psychiatric:         Mood and Affect: Mood normal.         Additional Data:     Labs:  Results from last 7 days   Lab Units 12/13/23  0528   WBC Thousand/uL 5.69   HEMOGLOBIN g/dL 8.2*   HEMATOCRIT % 30.6*   PLATELETS Thousands/uL 240   NEUTROS PCT % 65   LYMPHS PCT % 23   MONOS PCT % 7   EOS PCT % 3     Results from last 7 days   Lab Units 12/13/23  0528 12/12/23  0300   SODIUM mmol/L 142 140   POTASSIUM mmol/L 3.5 3.7   CHLORIDE mmol/L 105 105   CO2 mmol/L 26 23   BUN mg/dL 10 15   CREATININE mg/dL 0.89 0.94   ANION GAP mmol/L 11 12   CALCIUM mg/dL 9.1 9.4   ALBUMIN g/dL  --  4.1   TOTAL BILIRUBIN mg/dL  --  0.68   ALK PHOS U/L  --  48   ALT U/L  --  12   AST U/L  --  18   GLUCOSE RANDOM mg/dL 108 112     Results from last 7 days   Lab Units 12/12/23  0300   INR  1.03     Results from last 7 days   Lab Units 12/12/23  0228   POC GLUCOSE mg/dl 119               Lines/Drains:  Invasive Devices       Peripheral Intravenous Line  Duration             Peripheral IV 12/12/23 Right Antecubital 1 day                          Imaging: No pertinent imaging reviewed.     Recent Cultures (last 7 days):         Last 24 Hours Medication List:   Current Facility-Administered Medications   Medication Dose Route Frequency Provider Last Rate    acetaminophen  650 mg Oral Q6H PRN MaceyTANVI Montero-C      Fluticasone Furoate-Vilanterol  1 puff Inhalation Daily Macey Godfrey PA-C      iron sucrose (VENOFER) 200 mg in sodium chloride 0.9 % 100 mL IVPB  200 mg Intravenous Daily Bry Piedra DO      lisinopril  5 mg Oral Daily Jenni Quan PA-C      ondansetron  4 mg Intravenous Q6H PRN Jenni Quan PA-C      pantoprazole  40 mg Intravenous Q12H [facility] Ana Maria Marques PA-C      polyethylene glycol  4,000 mL Oral See Admin Instructions Jay Christopher PA-C          Today, Patient Was Seen By: Paul Helm DO    **Please Note: This note may have been constructed using a voice recognition system. **

## 2023-12-13 NOTE — DISCHARGE SUMMARY
[facility]  Discharge- Kortney Sánchez 1955, 80 y.o. female MRN: [id number]  Unit/Bed#: 7T [facility] 704-01 Encounter: [id number]  Primary Care Provider: BRIE Cardenas   Date and time admitted to hospital: 12/12/2023  2:23 AM    * Anemia  Assessment & Plan  Patient reports episode of bright red blood per rectum  Presented with Hgb 5.8  S/P 2 units of pRBC  GI consult  Occult stool  Iron Panel with severe iron deficiency  Patient to undergo endoscopic evaluation  Venofer 200 mg daily  Acute blood loss anemia ruled out by normal endoscopic evaluation  Likely secondary to inadequate dietary intake  Will discharge patient home on ferrous sulfate 324 mg daily  Follow-up CBC in the outpatient setting    Pancreatic cyst  Assessment & Plan  Incidental finding  F/U w/ PCP for imaging 6 months  CT: 1.6 cm cystic lesion pancreatic body. For simple cyst(s) between 1.5 to 2.0 cm, recommend followup every 6 months for 4 times, then every 1 year for 2 times, then every 2 years for 3 times. If stable after 10 years, then no more followups. (If patient reaches age 80, then can switch to age >80 algorithm.) Recommend next followup in 6 months.  Preferred imaging modality: abdomen MRI and MRCP with and without IV contrast, or triple phase abdomen CT with IV contrast.     Morbid obesity with BMI of 45.0-49.9, adult (HCC)  Assessment & Plan  BMI 39  Healthy diet and lifestyle modification recommended    Primary hypertension  Assessment & Plan  Continue lisinopril with hold parameters    Stage 3a chronic kidney disease [facility])  Assessment & Plan  Lab Results   Component Value Date    EGFR 67 12/13/2023    EGFR 62 12/12/2023    EGFR 65 11/07/2023    CREATININE 0.89 12/13/2023    CREATININE 0.94 12/12/2023    CREATININE 0.91 11/07/2023     Creatinine stable  Trend BMP              Medical Problems       Resolved Problems  Date Reviewed: 12/13/2023   None         Admission Date:   Admission Orders (From admission, onward)       Ordered        12/13/23 0920  Inpatient Admission  Once            12/12/23 0507  Place in Observation  Once                            Admitting Diagnosis: Dizziness [R42]  Anemia [D64.9]  GI bleed [K92.2]  Low hemoglobin [D64.9]    HPI: Kortney Sánchez is a 80 y.o. female with a PMH of Morbid obesity, HTN, HLD, CKD3a,  who presents with bright red blood per rectum. Procedures Performed: No orders of the defined types were placed in this encounter. Summary of Hospital Course: The patient was found to have a hemoglobin of 5.8 on presentation. Gastroenterology was consulted and recommended endoscopic evaluation. The patient received a total of 2 units of packed red blood cells with improvement of her hemoglobin. EGD/colonoscopy was found to be within normal limits. Patient is likely suffering from iron deficiency anemia secondary to inadequate dietary intake. Iron pills were sent to the patient's pharmacy. She was strongly encouraged to resume all preadmission medications at all preadmission dosages. She was also encouraged to follow-up with her PCP within 7 days 14 days. Significant Findings, Care, Treatment and Services Provided: EGD/colonoscopy    Complications: Not applicable    Condition at Discharge: stable         Discharge instructions/Information to patient and family:   See after visit summary for information provided to patient and family. Provisions for Follow-Up Care:  See after visit summary for information related to follow-up care and any pertinent home health orders. PCP: BRIE Cardenas    Disposition: Home    Planned Readmission: No    Discharge Statement   I spent 45 minutes discharging the patient. This time was spent on the day of discharge. I had direct contact with the patient on the day of discharge. Additional documentation is required if more than 30 minutes were spent on discharge.      Discharge Medications:  See after visit summary for reconciled discharge medications provided to patient and family.

## 2023-12-13 NOTE — NURSING NOTE
PT was D/C to home D/C instruction was given to PT and PT verbalized understanding of D/C instruction. IV was D/C no distress noted. All personal belonging was given to PT.  7 T staff accompany PT to lobby.

## 2023-12-13 NOTE — UTILIZATION REVIEW
Initial Clinical Review    Admission: Date/Time/Statement:   Admission Orders (From admission, onward)       Ordered        12/12/23 0507  Place in Observation  Once                          2/13/23 0921  Inpatient Admission  Once        Transfer Service: Hospitalist   Question Answer Comment   Level of Care Med Surg    Estimated length of stay More than 2 Midnights    Certification I certify that inpatient services are medically necessary for this patient for a duration of greater than two midnights. See H&P and MD Progress Notes for additional information about the patient's course of treatment. 12/13/23 0920   OBSERVATION  12/12 @  0507  CHANGED TO IP ADMISSION  12/13  @   [phone]  The patient will continue to require additional inpatient hospital stay due to lower GI bleed     ED Arrival Information       Expected   -    Arrival   12/12/2023 02:23    Acuity   Urgent              Means of arrival   Ambulance    Escorted by   [facility] ([address] [place] [address])    Service   Hospitalist    Admission type   Emergency              Arrival complaint   dizziness             Chief Complaint   Patient presents with    Dizziness     Arrives via EMS reporting she woke up feeling dizzy with hot flashes and sweating. Initial Presentation: 80 y.o. female  presents to ED  from home via  EMS  with bright red blood per  rectum. PMH  is  HTN,  CKD and obesity. Ct scan shows diverticulosis/GI hemorrhage. Initial hemoglobin  5.8. Admit  Observation with Anemia  and plan is  S/P  2  U PRBC in  ED, monitor labs,  GI consult, hemocult stool  and continue home meds. GI consult\  Plan EGD/colonoscopy  12/13.         ED Triage Vitals   Temperature Pulse Respirations Blood Pressure SpO2   12/12/23 0231 12/12/23 0231 12/12/23 0231 12/12/23 0231 12/12/23 0231   98 °F (36.7 °C) 93 18 (!) 176/84 97 %      Temp Source Heart Rate Source Patient Position - Orthostatic VS BP Location FiO2 (%)   12/12/23 0231 12/12/23 0231 12/12/23 0231 12/12/23 0231 --   Tympanic Monitor Sitting Left arm       Pain Score       12/12/23 0624       No Pain          Wt Readings from Last 1 Encounters:   12/12/23 88 kg (194 lb 0.1 oz)     Additional Vital Signs:   97.7 °F (36.5 °C) 70 18 113/61 86 95 % None (Room air) Sitting    12/12/23 2338 97.6 °F (36.4 °C) 73 18 129/74 101 95 % None (Room air) Sitting   12/12/23 1513 97.6 °F (36.4 °C) 72 18 169/76 94 95 % None (Room air) Sitting   12/12/23 1214 97.4 °F (36.3 °C) Abnormal  92 18 156/70 -- 98 % None (Room air) --   12/12/23 1135 97.1 °F (36.2 °C) Abnormal  74 18 146/81 -- 96 % None (Room air) --   12/12/23 1035 97.1 °F (36.2 °C) Abnormal  74 19 146/81 -- 96 % None (Room air) --   12/12/23 1005 97.5 °F (36.4 °C) 70 19 144/73 -- 96 % None (Room air) --   12/12/23 0950 97.2 °F (36.2 °C) Abnormal  71 19 145/77 -- 98 % None (Room air) --   12/12/23 0934 97 °F (36.1 °C) Abnormal  76 18 175/83 Abnormal  -- -- -- --   12/12/23 0928 97 °F (36.1 °C) Abnormal  76 18 175/83 Abnormal  -- 100 % None (Room air) Sitting   12/12/23 0747 97.1 °F (36.2 °C) Abnormal  68 18 137/83 117 97 % None (Room air) Sitting   12/12/23 0653 97.1 °F (36.2 °C) Abnormal  68 18 137/83 117 97 % None (Room air) Sitting   12/12/23 0611 97.4 °F (36.3 °C) Abnormal  75 18 145/68 -- 100 % None (Room air) Lying   12/12/23 0558 98.3 °F (36.8 °C) 90 18 183/62 Abnormal  -- 98 % -- --   12/12/23 0545 -- 84 18 140/71 -- 100 % None (Room air) --   12/12/23 0530 98.1 °F (36.7 °C) 86 18 135/71 -- 98 % None (Room air) --   12/12/23 0515 98 °F (36.7 °C) 85 18 148/69 -- -- -- --   12/12/23 0505 97.8 °F (36.6 °C) 97  18 160/53 -- 97 % None (Room air) Lying   Pulse: just returned from oob at 12/12/23 0505   12/12/23 0449 98.1 °F (36.7 °C) 90 18 154/83 -- -- -- --   12/12/23 0421 -- 111 Abnormal  18 108/73 -- 97 % -- Lying   12/12/23 0231 98 °F (36.7 °C) 93 18 176/84 Abnormal  -- 97 % None (Room air) Sitting     Pertinent Labs/Diagnostic Test Results:   CT high volume bleeding scan abdomen pelvis   Final Result by Belen Padron DO (12/12 4038)      No CT evidence of active high volume gastrointestinal hemorrhage. Colonic diverticulosis      1.6 cm cystic lesion pancreatic body. For simple cyst(s) between 1.5 to 2.0 cm, recommend followup every 6 months for 4 times, then every 1 year for 2 times, then every 2 years for 3 times. If stable after 10 years, then no more followups. (If patient    reaches age 80, then can switch to age >80 algorithm.) Recommend next followup in 6 months. Preferred imaging modality: abdomen MRI and MRCP with and without IV contrast, or triple phase abdomen CT with IV contrast, or abdomen MRI and MRCP without IV    contrast.                     The study was marked in EPIC for immediate notification.       Workstation performed: [id number]               Results from last 7 days   Lab Units 12/13/23 0528 12/12/23  0300   WBC Thousand/uL 5.69 5.10   HEMOGLOBIN g/dL 8.2* 5.8*   HEMATOCRIT % 30.6* 23.8*   PLATELETS Thousands/uL 240 247   NEUTROS ABS Thousands/µL 3.79 3.37         Results from last 7 days   Lab Units 12/13/23  0528 12/12/23  0300   SODIUM mmol/L 142 140   POTASSIUM mmol/L 3.5 3.7   CHLORIDE mmol/L 105 105   CO2 mmol/L 26 23   ANION GAP mmol/L 11 12   BUN mg/dL 10 15   CREATININE mg/dL 0.89 0.94   EGFR ml/min/1.73sq m 67 62   CALCIUM mg/dL 9.1 9.4   MAGNESIUM mg/dL 1.4*  --    PHOSPHORUS mg/dL 3.9  --      Results from last 7 days   Lab Units 12/12/23  0300   AST U/L 18   ALT U/L 12   ALK PHOS U/L 48   TOTAL PROTEIN g/dL 7.4   ALBUMIN g/dL 4.1   TOTAL BILIRUBIN mg/dL 0.68     Results from last 7 days   Lab Units 12/12/23  0228   POC GLUCOSE mg/dl 119     Results from last 7 days   Lab Units 12/13/23  0528 12/12/23  0300   GLUCOSE RANDOM mg/dL 108 112               Results from last 7 days   Lab Units 12/12/23  0459 12/12/23  0300   HS TNI 0HR ng/L  --  3   HS TNI 2HR ng/L 5  --    HSTNI D2 ng/L 2  --          Results from last 7 days   Lab Units 12/12/23  0300   PROTIME seconds 13.9   INR  1.03                             Results from last 7 days   Lab Units 12/12/23  0300   FERRITIN ng/mL 2*   IRON SATURATION % 1*   IRON ug/dL 10*   TIBC ug/dL 758*         Results from last 7 days   Lab Units 12/13/23  0615   UNIT PRODUCT CODE  [id number]  [id number]   UNIT NUMBER  [id number]  [id number]   UNITABO  A  A   UNITRH  POS  POS   CROSSMATCH  Compatible  Compatible   UNIT DISPENSE STATUS  Presumed Trans  Presumed Trans   UNIT PRODUCT VOL ml 350  350                 ED Treatment:   Medication Administration from 12/12/2023 0223 to 12/12/2023 0603         Date/Time Order Dose Route Action Comments     12/12/2023 0342 EST sodium chloride 0.9 % bolus 500 mL 0 mL Intravenous Stopped --     12/12/2023 0301 EST sodium chloride 0.9 % bolus 500 mL 500 mL Intravenous New Bag --     12/12/2023 0403 EST cefTRIAXone (ROCEPHIN) IVPB (premix in dextrose) 1,000 mg 50 mL 0 mg Intravenous Stopped --     12/12/2023 0341 EST cefTRIAXone (ROCEPHIN) IVPB (premix in dextrose) 1,000 mg 50 mL 1,000 mg Intravenous New Bag --     12/12/2023 0339 EST pantoprazole (PROTONIX) injection 40 mg 40 mg Intravenous Given --     12/12/2023 0416 EST iohexol (OMNIPAQUE) 350 MG/ML injection (SINGLE-DOSE) 100 mL 100 mL Intravenous Given --            Present on Admission:   Pancreatic cyst   Stage 3a chronic kidney disease ([address])   Primary hypertension   Anemia      Admitting Diagnosis: Dizziness [R42]  Anemia [D64.9]  GI bleed [K92.2]  Low hemoglobin [D64.9]  Age/Sex: 80 y.o. female  Admission Orders:  Scheduled Medications:  Fluticasone Furoate-Vilanterol, 1 puff, Inhalation, Daily  iron sucrose (VENOFER) 200 mg in sodium chloride 0.9 % 100 mL IVPB, 200 mg, Intravenous, Daily  lisinopril, 5 mg, Oral, Daily  pantoprazole, 40 mg, Intravenous, Q12H GABRIEL  polyethylene glycol, 4,000 mL, Oral, See Admin Instructions      Continuous IV Infusions:     PRN Meds:  acetaminophen, 650 mg, Oral, Q6H PRN  ondansetron, 4 mg, Intravenous, Q6H PRN        IP CONSULT TO GASTROENTEROLOGY    Network Utilization Review Department  ATTENTION: Please call with any questions or concerns to [phone] and carefully listen to the prompts so that you are directed to the right person. All voicemails are confidential.   For Discharge needs, contact Care Management DC Support Team at [phone] opt. 2  Send all requests for admission clinical reviews, approved or denied determinations and any other requests to dedicated fax number below belonging to the campus where the patient is receiving treatment.  List of dedicated fax numbers for the Facilities:  [place] DENIALS (Administrative/Medical Necessity) [phone]   DISCHARGE SUPPORT TEAM (NETWORK) [id number] [address] [facility] (Maternity/NICU/Pediatrics) [phone]   [address] [facility] [phone] 1505 [facility] 207 [facility] [address] [address] [facility] [address] 1300 [facility] [address] [place] [address] [phone]

## 2023-12-13 NOTE — ASSESSMENT & PLAN NOTE
Patient reports episode of bright red blood per rectum  Presented with Hgb 5.8  S/P 2 units of pRBC  GI consult  Occult stool  Iron Panel with severe iron deficiency  Patient to undergo endoscopic evaluation  Venofer 200 mg daily

## 2023-12-13 NOTE — ASSESSMENT & PLAN NOTE
Patient reports episode of bright red blood per rectum  Presented with Hgb 5.8  S/P 2 units of pRBC  GI consult  Occult stool  Iron Panel with severe iron deficiency  Patient to undergo endoscopic evaluation  Venofer 200 mg daily  Acute blood loss anemia ruled out by normal endoscopic evaluation  Likely secondary to inadequate dietary intake  Will discharge patient home on ferrous sulfate 324 mg daily  Follow-up CBC in the outpatient setting

## 2023-12-13 NOTE — PLAN OF CARE
Problem: SAFETY ADULT  Goal: Patient will remain free of falls  Description: INTERVENTIONS:  - Educate patient/family on patient safety including physical limitations  - Instruct patient to call for assistance with activity   - Consult OT/PT to assist with strengthening/mobility   - Keep Call bell within reach  - Keep bed low and locked with side rails adjusted as appropriate  - Keep care items and personal belongings within reach  - Initiate and maintain comfort rounds  - Make Fall Risk Sign visible to staff  -- Apply yellow socks and bracelet for high fall risk patients  - Consider moving patient to room near nurses station  Outcome: Progressing  Goal: Maintain or return to baseline ADL function  Description: INTERVENTIONS:  -  Assess patient's ability to carry out ADLs; assess patient's baseline for ADL function and identify physical deficits which impact ability to perform ADLs (bathing, care of mouth/teeth, toileting, grooming, dressing, etc.)  - Assess/evaluate cause of self-care deficits   - Assess range of motion  - Assess patient's mobility; develop plan if impaired  - Assess patient's need for assistive devices and provide as appropriate  - Encourage maximum independence but intervene and supervise when necessary  - Involve family in performance of ADLs  - Assess for home care needs following discharge   - Consider OT consult to assist with ADL evaluation and planning for discharge  - Provide patient education as appropriate  Outcome: Progressing  Goal: Maintains/Returns to pre admission functional level  Description: INTERVENTIONS:  - Perform AM-PAC 6 Click Basic Mobility/ Daily Activity assessment daily.  - Set and communicate daily mobility goal to care team and patient/family/caregiver.    - Collaborate with rehabilitation services on mobility goals if consulted  -- Out of bed for toileting  - Record patient progress and toleration of activity level   Outcome: Progressing     Problem: DISCHARGE PLANNING  Goal: Discharge to home or other facility with appropriate resources  Description: INTERVENTIONS:  - Identify barriers to discharge w/patient and caregiver  - Arrange for needed discharge resources and transportation as appropriate  - Identify discharge learning needs (meds, wound care, etc.)  - Arrange for interpretive services to assist at discharge as needed  - Refer to Case Management Department for coordinating discharge planning if the patient needs post-hospital services based on physician/advanced practitioner order or complex needs related to functional status, cognitive ability, or social support system  Outcome: Progressing     Problem: Knowledge Deficit  Goal: Patient/family/caregiver demonstrates understanding of disease process, treatment plan, medications, and discharge instructions  Description: Complete learning assessment and assess knowledge base.   Interventions:  - Provide teaching at level of understanding  - Provide teaching via preferred learning methods  Outcome: Progressing     Problem: GASTROINTESTINAL - ADULT  Goal: Minimal or absence of nausea and/or vomiting  Description: INTERVENTIONS:  - Administer IV fluids if ordered to ensure adequate hydration  - Maintain NPO status until nausea and vomiting are resolved  - Nasogastric tube if ordered  - Administer ordered antiemetic medications as needed  - Provide nonpharmacologic comfort measures as appropriate  - Advance diet as tolerated, if ordered  - Consider nutrition services referral to assist patient with adequate nutrition and appropriate food choices  Outcome: Progressing  Goal: Maintains or returns to baseline bowel function  Description: INTERVENTIONS:  - Assess bowel function  - Encourage oral fluids to ensure adequate hydration  - Administer IV fluids if ordered to ensure adequate hydration  - Administer ordered medications as needed  - Encourage mobilization and activity  - Consider nutritional services referral to assist patient with adequate nutrition and appropriate food choices  Outcome: Progressing  Goal: Maintains adequate nutritional intake  Description: INTERVENTIONS:  - Monitor percentage of each meal consumed  - Identify factors contributing to decreased intake, treat as appropriate  - Assist with meals as needed  - Monitor I&O, weight, and lab values if indicated  - Obtain nutrition services referral as needed  Outcome: Progressing  Goal: Establish and maintain optimal ostomy function  Description: INTERVENTIONS:  - Assess bowel function  - Encourage oral fluids to ensure adequate hydration  - Administer IV fluids if ordered to ensure adequate hydration   - Administer ordered medications as needed  - Encourage mobilization and activity  - Nutrition services referral to assist patient with appropriate food choices  - Assess stoma site  - Consider wound care consult   Outcome: Progressing  Goal: Oral mucous membranes remain intact  Description: INTERVENTIONS  - Assess oral mucosa and hygiene practices  - Implement preventative oral hygiene regimen  - Implement oral medicated treatments as ordered  - Initiate Nutrition services referral as needed  Outcome: Progressing     Problem: HEMATOLOGIC - ADULT  Goal: Maintains hematologic stability  Description: INTERVENTIONS  - Assess for signs and symptoms of bleeding or hemorrhage  - Monitor labs  - Administer supportive blood products/factors as ordered and appropriate  Outcome: Progressing

## 2023-12-13 NOTE — ASSESSMENT & PLAN NOTE
Lab Results   Component Value Date    EGFR 67 12/13/2023    EGFR 62 12/12/2023    EGFR 65 11/07/2023    CREATININE 0.89 12/13/2023    CREATININE 0.94 12/12/2023    CREATININE 0.91 11/07/2023     Creatinine stable  Trend BMP

## 2023-12-13 NOTE — PLAN OF CARE
Problem: PAIN - ADULT  Goal: Verbalizes/displays adequate comfort level or baseline comfort level  Description: Interventions:  - Encourage patient to monitor pain and request assistance  - Assess pain using appropriate pain scale  - Administer analgesics based on type and severity of pain and evaluate response  - Implement non-pharmacological measures as appropriate and evaluate response  - Consider cultural and social influences on pain and pain management  - Notify physician/advanced practitioner if interventions unsuccessful or patient reports new pain  Outcome: Progressing     Problem: INFECTION - ADULT  Goal: Absence or prevention of progression during hospitalization  Description: INTERVENTIONS:  - Assess and monitor for signs and symptoms of infection  - Monitor lab/diagnostic results  - Monitor all insertion sites, i.e. indwelling lines, tubes, and drains  - Monitor endotracheal if appropriate and nasal secretions for changes in amount and color  - [place] appropriate cooling/warming therapies per order  - Administer medications as ordered  - Instruct and encourage patient and family to use good hand hygiene technique  - Identify and instruct in appropriate isolation precautions for identified infection/condition  Outcome: Progressing  Goal: Absence of fever/infection during neutropenic period  Description: INTERVENTIONS:  - Monitor WBC    Outcome: Progressing     Problem: SAFETY ADULT  Goal: Patient will remain free of falls  Description: INTERVENTIONS:  - Educate patient/family on patient safety including physical limitations  - Instruct patient to call for assistance with activity   - Consult OT/PT to assist with strengthening/mobility   - Keep Call bell within reach  - Keep bed low and locked with side rails adjusted as appropriate  - Keep care items and personal belongings within reach  - Initiate and maintain comfort rounds  - Make Fall Risk Sign visible to staff  - Offer Toileting every 2 Hours, in advance of need  - Apply yellow socks and bracelet for high fall risk patients  - Consider moving patient to room near nurses station  Outcome: Progressing  Goal: Maintain or return to baseline ADL function  Description: INTERVENTIONS:  -  Assess patient's ability to carry out ADLs; assess patient's baseline for ADL function and identify physical deficits which impact ability to perform ADLs (bathing, care of mouth/teeth, toileting, grooming, dressing, etc.)  - Assess/evaluate cause of self-care deficits   - Assess range of motion  - Assess patient's mobility; develop plan if impaired  - Assess patient's need for assistive devices and provide as appropriate  - Encourage maximum independence but intervene and supervise when necessary  - Involve family in performance of ADLs  - Assess for home care needs following discharge   - Consider OT consult to assist with ADL evaluation and planning for discharge  - Provide patient education as appropriate  Outcome: Progressing  Goal: Maintains/Returns to pre admission functional level  Description: INTERVENTIONS:  - Perform AM-PAC 6 Click Basic Mobility/ Daily Activity assessment daily.  - Set and communicate daily mobility goal to care team and patient/family/caregiver. - Collaborate with rehabilitation services on mobility goals if consulted  - Perform Range of Motion 2 times a day. - Reposition patient every 2 hours.   - Dangle patient 2 times a day  - Stand patient 2 times a day  - Ambulate patient 2 times a day  - Out of bed to chair 2 times a day   - Out of bed for meals 2 times a day  - Out of bed for toileting  - Record patient progress and toleration of activity level   Outcome: Progressing     Problem: DISCHARGE PLANNING  Goal: Discharge to home or other facility with appropriate resources  Description: INTERVENTIONS:  - Identify barriers to discharge w/patient and caregiver  - Arrange for needed discharge resources and transportation as appropriate  - Identify discharge learning needs (meds, wound care, etc.)  - Arrange for interpretive services to assist at discharge as needed  - Refer to Case Management Department for coordinating discharge planning if the patient needs post-hospital services based on physician/advanced practitioner order or complex needs related to functional status, cognitive ability, or social support system  Outcome: Progressing     Problem: Knowledge Deficit  Goal: Patient/family/caregiver demonstrates understanding of disease process, treatment plan, medications, and discharge instructions  Description: Complete learning assessment and assess knowledge base.   Interventions:  - Provide teaching at level of understanding  - Provide teaching via preferred learning methods  Outcome: Progressing     Problem: GASTROINTESTINAL - ADULT  Goal: Minimal or absence of nausea and/or vomiting  Description: INTERVENTIONS:  - Administer IV fluids if ordered to ensure adequate hydration  - Maintain NPO status until nausea and vomiting are resolved  - Nasogastric tube if ordered  - Administer ordered antiemetic medications as needed  - Provide nonpharmacologic comfort measures as appropriate  - Advance diet as tolerated, if ordered  - Consider nutrition services referral to assist patient with adequate nutrition and appropriate food choices  Outcome: Progressing  Goal: Maintains or returns to baseline bowel function  Description: INTERVENTIONS:  - Assess bowel function  - Encourage oral fluids to ensure adequate hydration  - Administer IV fluids if ordered to ensure adequate hydration  - Administer ordered medications as needed  - Encourage mobilization and activity  - Consider nutritional services referral to assist patient with adequate nutrition and appropriate food choices  Outcome: Progressing  Goal: Maintains adequate nutritional intake  Description: INTERVENTIONS:  - Monitor percentage of each meal consumed  - Identify factors contributing to decreased intake, treat as appropriate  - Assist with meals as needed  - Monitor I&O, weight, and lab values if indicated  - Obtain nutrition services referral as needed  Outcome: Progressing  Goal: Establish and maintain optimal ostomy function  Description: INTERVENTIONS:  - Assess bowel function  - Encourage oral fluids to ensure adequate hydration  - Administer IV fluids if ordered to ensure adequate hydration   - Administer ordered medications as needed  - Encourage mobilization and activity  - Nutrition services referral to assist patient with appropriate food choices  - Assess stoma site  - Consider wound care consult   Outcome: Progressing  Goal: Oral mucous membranes remain intact  Description: INTERVENTIONS  - Assess oral mucosa and hygiene practices  - Implement preventative oral hygiene regimen  - Implement oral medicated treatments as ordered  - Initiate Nutrition services referral as needed  Outcome: Progressing     Problem: METABOLIC, FLUID AND ELECTROLYTES - ADULT  Goal: Electrolytes maintained within normal limits  Description: INTERVENTIONS:  - Monitor labs and assess patient for signs and symptoms of electrolyte imbalances  - Administer electrolyte replacement as ordered  - Monitor response to electrolyte replacements, including repeat lab results as appropriate  - Instruct patient on fluid and nutrition as appropriate  Outcome: Progressing  Goal: Fluid balance maintained  Description: INTERVENTIONS:  - Monitor labs   - Monitor I/O and WT  - Instruct patient on fluid and nutrition as appropriate  - Assess for signs & symptoms of volume excess or deficit  Outcome: Progressing  Goal: Glucose maintained within target range  Description: INTERVENTIONS:  - Monitor Blood Glucose as ordered  - Assess for signs and symptoms of hyperglycemia and hypoglycemia  - Administer ordered medications to maintain glucose within target range  - Assess nutritional intake and initiate nutrition service referral as needed  Outcome: Progressing     Problem: HEMATOLOGIC - ADULT  Goal: Maintains hematologic stability  Description: INTERVENTIONS  - Assess for signs and symptoms of bleeding or hemorrhage  - Monitor labs  - Administer supportive blood products/factors as ordered and appropriate  Outcome: Progressing

## 2023-12-14 ENCOUNTER — TRANSITIONAL CARE MANAGEMENT (OUTPATIENT)
Dept: FAMILY MEDICINE CLINIC | Facility: CLINIC | Age: 68
End: 2023-12-14

## 2023-12-14 NOTE — UTILIZATION REVIEW
NOTIFICATION OF INPATIENT MEDICAL ADMISSION   AUTHORIZATION REQUEST   SERVICING FACILITY:   [address] [place] [address]., [facility], [address] [place] [address]  Tax ID: [id number]  NPI: [id number] ATTENDING PROVIDER:  Attending Name and NPI#: Yanet Click [[id number]]  Address: [address] [place] [address]., [facility], [address] [place] [address]  Phone: [phone]     ADMISSION INFORMATION:  Place of Service: [address] [place] [address]  Place of Service Code: 21  Inpatient Admission Date/Time: 12/13/23  9:20 AM  Discharge Date/Time: 12/13/2023  4:55 PM  Admitting Diagnosis Code/Description:  Dizziness [R42]  Anemia [D64.9]  GI bleed [K92.2]  Low hemoglobin [D64.9]     UTILIZATION REVIEW CONTACT:  Jose Raul Brantley Utilization   Network Utilization Review Department  Phone: [phone]  Fax [phone]  Email: [email or web address]  Contact for approvals/pending authorizations, clinical reviews, and discharge. PHYSICIAN ADVISORY SERVICES:  Medical Necessity Denial & [employer] Review  Phone: [phone]  Fax: [phone]  Email: [email or web address]     DISCHARGE SUPPORT TEAM:  For Patients Discharge Needs & Updates  Phone: [phone] opt. 2 Fax: [phone]  Email: [email or web address]

## 2023-12-19 PROCEDURE — 88305 TISSUE EXAM BY PATHOLOGIST: CPT | Performed by: PATHOLOGY

## 2023-12-19 PROCEDURE — 88313 SPECIAL STAINS GROUP 2: CPT | Performed by: PATHOLOGY

## 2023-12-26 ENCOUNTER — OFFICE VISIT (OUTPATIENT)
Dept: FAMILY MEDICINE CLINIC | Facility: CLINIC | Age: 68
End: 2023-12-26
Payer: COMMERCIAL

## 2023-12-26 VITALS
TEMPERATURE: 97.6 F | BODY MASS INDEX: 43.41 KG/M2 | HEIGHT: 55 IN | SYSTOLIC BLOOD PRESSURE: 104 MMHG | HEART RATE: 50 BPM | OXYGEN SATURATION: 99 % | WEIGHT: 187.6 LBS | DIASTOLIC BLOOD PRESSURE: 60 MMHG

## 2023-12-26 DIAGNOSIS — K86.2 PANCREATIC CYST: ICD-10-CM

## 2023-12-26 DIAGNOSIS — K21.9 GASTROESOPHAGEAL REFLUX DISEASE WITHOUT ESOPHAGITIS: ICD-10-CM

## 2023-12-26 DIAGNOSIS — R05.2 SUBACUTE COUGH: ICD-10-CM

## 2023-12-26 DIAGNOSIS — D50.8 IRON DEFICIENCY ANEMIA SECONDARY TO INADEQUATE DIETARY IRON INTAKE: Primary | ICD-10-CM

## 2023-12-26 DIAGNOSIS — K59.00 CONSTIPATION, UNSPECIFIED CONSTIPATION TYPE: ICD-10-CM

## 2023-12-26 PROCEDURE — 99496 TRANSJ CARE MGMT HIGH F2F 7D: CPT

## 2023-12-26 RX ORDER — RIBOFLAVIN (VITAMIN B2) 100 MG
100 TABLET ORAL DAILY
Qty: 90 TABLET | Refills: 1 | Status: SHIPPED | OUTPATIENT
Start: 2023-12-26

## 2023-12-26 RX ORDER — PANTOPRAZOLE SODIUM 20 MG/1
TABLET, DELAYED RELEASE ORAL
Qty: 90 TABLET | Refills: 0 | Status: SHIPPED | OUTPATIENT
Start: 2023-12-26

## 2023-12-26 RX ORDER — BENZONATATE 100 MG/1
100 CAPSULE ORAL 3 TIMES DAILY PRN
Qty: 20 CAPSULE | Refills: 0 | Status: SHIPPED | OUTPATIENT
Start: 2023-12-26

## 2023-12-26 RX ORDER — PANTOPRAZOLE SODIUM 20 MG/1
20 TABLET, DELAYED RELEASE ORAL
Qty: 30 TABLET | Refills: 5 | Status: SHIPPED | OUTPATIENT
Start: 2023-12-26 | End: 2023-12-26

## 2023-12-26 NOTE — PROGRESS NOTES
Assessment & Plan     1. Iron deficiency anemia secondary to inadequate dietary iron intake  Assessment & Plan:  Presented  to the hospital with Hgb 5.8  Received  2 units of pRBC  Hgb on discharge 8.2    GI consult  Occult stool  Patient to undergo endoscopic evaluation  Acute blood loss anemia ruled out by normal endoscopic evaluation    Iron Panel with severe iron deficiency  Secondary to inadequate dietary intake  Discharged on ferrous sulfate 324 mg daily  Added vitamin C for absorption of ferrous sulfate.     Orders:  -     Ascorbic Acid (vitamin C) 100 MG tablet; Take 1 tablet (100 mg total) by mouth daily    2. Gastroesophageal reflux disease without esophagitis  Assessment & Plan:  PMH of GERD   At last appointment patient denied symptoms and was discontinued from protonix   Recent symptom flare   Restarted on pantoprazole 20mg    Education provided for foods to avoid with GERD     Orders:  -     pantoprazole (PROTONIX) 20 mg tablet; Take 1 tablet (20 mg total) by mouth daily before breakfast    3. Subacute cough  Assessment & Plan:  Recurrent cough   Benzonatate started  Has not filled her inhaler as she has been in stock, advised to call other pharmacies to see if there are other options available    Orders:  -     benzonatate (TESSALON PERLES) 100 mg capsule; Take 1 capsule (100 mg total) by mouth 3 (three) times a day as needed for cough    4. Constipation, unspecified constipation type  Assessment & Plan:  Occasional constipation    Docusate ordered     Orders:  -     docusate sodium (COLACE) 50 mg capsule; Take 1 capsule (50 mg total) by mouth in the morning    5. Pancreatic cyst  Assessment & Plan:  Incidental finding on CT ab/pelvis     CT Ab/Pelvis  1.6 cm cystic lesion pancreatic body.   For simple cyst(s) between 1.5 to 2.0 cm, recommend followup every 6 months for 4 times, then every 1 year for 2 times, then every 2 years for 3 times. If stable after 10 years, then no more followups. (If patient  reaches age 81, then can switch to age >80 algorithm.) Recommend next followup in 6 months. Preferred imaging modality: abdomen MRI and MRCP with and without IV contrast, or triple phase abdomen CT with IV contrast.   Plan to order scan at follow up appointment in June           Subjective     Transitional Care Management Review:   Diana Conner is a 68 y.o. female here for TCM follow up.     During the TCM phone call patient stated:  TCM Call       Patient was hospitialized at  [facility]    Date of Admission  12/12/23    Date of discharge  12/13/23          TCM Call       None          Presented to the ED via EMS on December 12, 2023 with weakness and bright red blood per rectum. Patient states she woke up and needed to use the bathroom and felt very lightheaded and clammy. Her bowel movement had blood in her stool that filled the whole bowl. She also felt some shortness of breath, generalized abdominal pain. Past medical history of a gastric ulcer from taking ibuprofen. Not on any blood thinners but takes aspirin daily.  The patient was found to have a hemoglobin of 5.8 on presentation. The patient received a total of 2 units of packed red blood cells with improvement of her hemoglobin.    Gastroenterology was consulted and recommended endoscopic evaluation.    12/13/23 EGD ·  The stomach and duodenum appeared normal. Irreg z line s/p biopsies    12/13/23 Colonoscopy   Hemorrhoids  Diverticulosis in the sigmoid colon    Likely suffering from iron deficiency anemia secondary to inadequate dietary intake.  Iron pills were sent to the patient's pharmacy.       Review of Systems   Constitutional:  Negative for activity change, chills, fatigue and fever.   HENT:  Negative for congestion, ear pain, rhinorrhea, sore throat and trouble swallowing.    Eyes:  Negative for pain and visual disturbance.   Respiratory:  Negative for cough, chest tightness and shortness of breath.    Cardiovascular:  Negative for  "chest pain, palpitations and leg swelling.   Gastrointestinal:  Negative for abdominal pain, constipation, diarrhea, nausea and vomiting.   Genitourinary:  Negative for difficulty urinating, dysuria, hematuria and urgency.   Musculoskeletal:  Negative for arthralgias and back pain.   Skin:  Negative for color change and rash.   Neurological:  Negative for dizziness, seizures, syncope and headaches.   Psychiatric/Behavioral:  Negative for dysphoric mood. The patient is not nervous/anxious.    All other systems reviewed and are negative.      Objective     /60 (BP Location: Left arm, Patient Position: Sitting, Cuff Size: Adult)   Pulse (!) 50   Temp 97.6 °F (36.4 °C) (Temporal)   Ht 4' 7\" (1.397 m)   Wt 85.1 kg (187 lb 9.6 oz)   SpO2 99%   BMI 43.60 kg/m²      Physical Exam  Vitals and nursing note reviewed.   Constitutional:       General: She is not in acute distress.     Appearance: Normal appearance. She is well-developed and normal weight.   HENT:      Head: Normocephalic and atraumatic.      Right Ear: Tympanic membrane, ear canal and external ear normal. There is no impacted cerumen.      Left Ear: Tympanic membrane, ear canal and external ear normal. There is no impacted cerumen.      Nose: Nose normal.      Mouth/Throat:      Mouth: Mucous membranes are moist.   Eyes:      Extraocular Movements: Extraocular movements intact.      Conjunctiva/sclera: Conjunctivae normal.      Pupils: Pupils are equal, round, and reactive to light.   Cardiovascular:      Rate and Rhythm: Normal rate and regular rhythm.      Pulses: Normal pulses.      Heart sounds: Normal heart sounds. No murmur heard.  Pulmonary:      Effort: Pulmonary effort is normal. No respiratory distress.      Breath sounds: Normal breath sounds.   Abdominal:      General: Bowel sounds are normal.      Palpations: Abdomen is soft.      Tenderness: There is no abdominal tenderness.   Musculoskeletal:         General: Normal range of motion.    "   Cervical back: Normal range of motion and neck supple.      Right lower leg: No edema.      Left lower leg: No edema.   Skin:     General: Skin is warm and dry.      Capillary Refill: Capillary refill takes less than 2 seconds.   Neurological:      General: No focal deficit present.      Mental Status: She is alert and oriented to person, place, and time. Mental status is at baseline.   Psychiatric:         Mood and Affect: Mood normal.         Behavior: Behavior normal.         Thought Content: Thought content normal.         Judgment: Judgment normal.       Medications have been reviewed by provider in current encounter    BRIE Hinojosa

## 2023-12-26 NOTE — ASSESSMENT & PLAN NOTE
Recurrent cough   Benzonatate started  Has not filled her inhaler as she has been in stock, advised to call other pharmacies to see if there are other options available

## 2023-12-26 NOTE — ASSESSMENT & PLAN NOTE
Presented  to the hospital with Hgb 5.8  Received  2 units of pRBC  Hgb on discharge 8.2    GI consult  Occult stool  Patient to undergo endoscopic evaluation  Acute blood loss anemia ruled out by normal endoscopic evaluation    Iron Panel with severe iron deficiency  Secondary to inadequate dietary intake  Discharged on ferrous sulfate 324 mg daily  Added vitamin C for absorption of ferrous sulfate.

## 2023-12-26 NOTE — ASSESSMENT & PLAN NOTE
PMH of GERD   At last appointment patient denied symptoms and was discontinued from protonix   Recent symptom flare   Restarted on pantoprazole 20mg    Education provided for foods to avoid with GERD

## 2023-12-26 NOTE — ASSESSMENT & PLAN NOTE
Incidental finding on CT ab/pelvis     CT Ab/Pelvis  1.6 cm cystic lesion pancreatic body.   For simple cyst(s) between 1.5 to 2.0 cm, recommend followup every 6 months for 4 times, then every 1 year for 2 times, then every 2 years for 3 times. If stable after 10 years, then no more followups. (If patient reaches age 80, then can switch to age >80 algorithm.) Recommend next followup in 6 months. Preferred imaging modality: abdomen MRI and MRCP with and without IV contrast, or triple phase abdomen CT with IV contrast.   Plan to order scan at follow up appointment in June

## 2024-02-21 PROBLEM — Z12.39 SCREENING FOR MALIGNANT NEOPLASM OF BREAST: Status: RESOLVED | Noted: 2018-11-05 | Resolved: 2024-02-21

## 2024-02-21 PROBLEM — R05.2 SUBACUTE COUGH: Status: RESOLVED | Noted: 2023-12-26 | Resolved: 2024-02-21

## 2024-02-26 DIAGNOSIS — I10 PRIMARY HYPERTENSION: ICD-10-CM

## 2024-02-26 RX ORDER — LISINOPRIL 5 MG/1
5 TABLET ORAL DAILY
Qty: 90 TABLET | Refills: 0 | Status: SHIPPED | OUTPATIENT
Start: 2024-02-26

## 2024-03-23 DIAGNOSIS — K21.9 GASTROESOPHAGEAL REFLUX DISEASE WITHOUT ESOPHAGITIS: ICD-10-CM

## 2024-03-24 RX ORDER — PANTOPRAZOLE SODIUM 20 MG/1
TABLET, DELAYED RELEASE ORAL
Qty: 90 TABLET | Refills: 0 | Status: SHIPPED | OUTPATIENT
Start: 2024-03-24

## 2024-03-25 ENCOUNTER — OFFICE VISIT (OUTPATIENT)
Dept: SLEEP CENTER | Facility: CLINIC | Age: 69
End: 2024-03-25
Payer: COMMERCIAL

## 2024-03-25 VITALS
HEIGHT: 55 IN | DIASTOLIC BLOOD PRESSURE: 70 MMHG | OXYGEN SATURATION: 97 % | SYSTOLIC BLOOD PRESSURE: 124 MMHG | HEART RATE: 83 BPM | WEIGHT: 195.2 LBS | BODY MASS INDEX: 45.17 KG/M2

## 2024-03-25 DIAGNOSIS — I70.209 ATHEROSCLEROSIS OF ARTERIES OF EXTREMITIES (HCC): ICD-10-CM

## 2024-03-25 DIAGNOSIS — Z91.09 ENVIRONMENTAL ALLERGIES: ICD-10-CM

## 2024-03-25 DIAGNOSIS — G47.09 OTHER INSOMNIA: ICD-10-CM

## 2024-03-25 DIAGNOSIS — J45.20 MILD INTERMITTENT ASTHMA WITHOUT COMPLICATION: ICD-10-CM

## 2024-03-25 DIAGNOSIS — K21.9 GASTROESOPHAGEAL REFLUX DISEASE WITHOUT ESOPHAGITIS: ICD-10-CM

## 2024-03-25 DIAGNOSIS — N18.31 STAGE 3A CHRONIC KIDNEY DISEASE (HCC): Chronic | ICD-10-CM

## 2024-03-25 DIAGNOSIS — G47.33 OSA (OBSTRUCTIVE SLEEP APNEA): Primary | ICD-10-CM

## 2024-03-25 DIAGNOSIS — E61.1 IRON DEFICIENCY: ICD-10-CM

## 2024-03-25 DIAGNOSIS — G25.81 RLS (RESTLESS LEGS SYNDROME): ICD-10-CM

## 2024-03-25 DIAGNOSIS — Z87.891 FORMER SMOKER: ICD-10-CM

## 2024-03-25 DIAGNOSIS — L71.9 ROSACEA: ICD-10-CM

## 2024-03-25 DIAGNOSIS — R06.00 DYSPNEA, UNSPECIFIED TYPE: ICD-10-CM

## 2024-03-25 DIAGNOSIS — I10 PRIMARY HYPERTENSION: Chronic | ICD-10-CM

## 2024-03-25 PROCEDURE — G2211 COMPLEX E/M VISIT ADD ON: HCPCS | Performed by: INTERNAL MEDICINE

## 2024-03-25 PROCEDURE — 99204 OFFICE O/P NEW MOD 45 MIN: CPT | Performed by: INTERNAL MEDICINE

## 2024-03-25 RX ORDER — ZOLPIDEM TARTRATE 5 MG/1
5 TABLET ORAL AS NEEDED
Qty: 2 TABLET | Refills: 0 | Status: SHIPPED | OUTPATIENT
Start: 2024-03-25 | End: 2024-03-27

## 2024-03-25 RX ORDER — CLINDAMYCIN PHOSPHATE 10 UG/ML
LOTION TOPICAL 2 TIMES DAILY
Qty: 60 ML | Refills: 0 | Status: SHIPPED | OUTPATIENT
Start: 2024-03-25

## 2024-03-25 NOTE — TELEPHONE ENCOUNTER
Reason for call:   [x] Refill   [] Prior Auth  [] Other:     Office:   [x] PCP/Provider -   [] Specialty/Provider -     Medication: Clindamycin     Dose/Frequency: 1 % lotion applied topically 2x daily     Quantity: 60 mL    Pharm[id number] [place] [place] [facility] [address] [facility] [phone]     Does the patient have enough for 3 days?   [] Yes   [x] No - Send as HP to POD

## 2024-03-25 NOTE — PROGRESS NOTES
Consultation - Sleep Center   Diana Conner  68 y.o. female  :1955  MRN:[id number]  DOS:3/25/2024    Physician Requesting Consult: Daiana Lees CRNP             Reason for Consult : At your kind request I saw Diana Conner for initial sleep evaluation today. Patient's presents with a complaint of Disrupted sleep  :.    PFSH, Problem List, Medications & Allergies were reviewed in EMR.    Diana  has a past medical history of Anemia (2023), Asthma, Gastroesophageal reflux disease without esophagitis (2023), Gout (2021), Hypertension (10/25/2023), Lateral epicondylitis (10/9/2015), and Stage 3 chronic kidney disease, unspecified whether stage 3a or 3b CKD (HCC) (2022).      She has a current medication list which includes the following prescription(s): albuterol, vitamin c, aspirin, cholecalciferol, ferrous sulfate, lisinopril, oxybutynin, pantoprazole, pravastatin, zolpidem, benzonatate, clindamycin, docusate sodium, and fluticasone-salmeterol.      HPI: Her sleep difficulties started approximately a year ago and there is no clear precipitating cause.  Diana sleeps alone is un aware of snoring that is reported by family.  Other complaints: Recent hospitalization for GI bleed for which she required transfusion.  He also has significant iron deficiency and is presently on iron supplementation. Restless Leg Syndrome: has typical symptoms occurring 1-2 times a week when trying to sleep and associated with urges to move.;.  Parasomnia: no features reported    Sleep Routine (averaged): Typical Bedtime: 1 AM.  Gets OOB: 8-9 AM. TIB:>7 hrs.   Sleep latency:> 60 minutes because mind races.  She denies use of electronics in the bedroom or clock watching.; Sleep Interruptions: 1-2, not always sure of the cause and struggles to fall back asleep. Awakens: Spontaneously  Upon awakening: rarely feels refreshed .  She estimates getting 5hrs sleep.  Daytime Function:Diana reports excessive  "daytime sleepiness feels like napping & is dozing off unintentionally for 15 to 20 minutes.  She rated herself at Total score: 4 /24 on the [place] Sleepiness Scale.     Habits:   reports that she quit smoking about 9 years ago. Her smoking use included cigarettes. She has quit using smokeless tobacco.;  reports no history of alcohol use.; Reports no history of drug use.;  E-Cigarette/Vaping  Never User; Caffeine use:limited; Exercise routine: regular.    Occupation:     Family History: Father had obstructive sleep apnea  ROS: Significant for few pounds intentional weight reduction.  He has allergies and asthma.  She reports shortness of breath and cough but no wheezing.  She reported no cardiac symptoms.  Acid reflux is controlled.    EXAM:  /70 (BP Location: Left arm, Cuff Size: Adult)   Pulse 83   Ht 4' 7\" (1.397 m)   Wt 88.5 kg (195 lb 3.2 oz)   SpO2 97%   BMI 45.37 kg/m²    General: Well groomed female, well appearing, in no apparent distress.   Neurological: Alert and orientated; cooperative; Cranial nerves intact;    Psychiatric: Speech: Clear and coherent; normal mood, affect & thought   Skin: Warm and dry; Color& Hydration good; no facial rashes or lesions   HEENT:  Craniofacial anatomy: normal Sinuses: Non-tender. TMJ: Normal    Eyes: EOM's intact; conjunctiva/corneas clear   Ears: Appear normal     Nasal Airway: assymetric nares Septum: Intact; Turbinates: Normal; Rhinorrhea: None  Mouth: Lips: Normal posture; Dentition: edentulous. Mucosa: Moist; Hard Palate:narrow and high arched   Oropharryx: crowded and AP narrowing Tongue: Mallampati:Class IV and MobileSoft Palate:  redundant  Tonsils: absent  Neck: Is thick; neck Circumference: 16 \"; supple; no abnormal masses; Thyroid: Normal. Trachea: Central.    Lymph: No cervical Lymhadenopathy  Heart: S1,S2 normal; RRR; no gallop; no murmur   Lungs: Respiratory Effort: Normal. Air entry good bilaterally.  No wheezes.  No rales  Abdomen: Obese, soft & " "non-tender    Extremities: No pedal edema.  No clubbing or cyanosis.    Musculoskeletal:  Motor normal; Gait: Normal.       BMP\"s demonstrated normal CO2 levels elevated fasting glucose and otherwise unremarkable.  Last CBC demonstrated hemoglobin of 8.2 mg percent and hematocrit of 30.6%.  Iron studies demonstrated abnormally low iron stores and ferritin of 2 ng/mL      IMPRESSION: Primary/Secondary Sleep Diagnoses (to Medical or Psych conditions) & Comorbidities   1. JOEY (obstructive sleep apnea)  Diagnostic Sleep Study    CPAP Study      2. Other insomnia  zolpidem (AMBIEN) 5 mg tablet      3. RLS (restless legs syndrome)        4. Dyspnea, unspecified type  Ambulatory Referral to Sleep Medicine      5. Mild intermittent asthma without complication        6. Environmental allergies        7. Former smoker        8. Primary hypertension        9. Atherosclerosis of arteries of extremities (HCC)        10. Gastroesophageal reflux disease without esophagitis        11. Stage 3a chronic kidney disease (HCC)        12. Iron deficiency             PLAN:   1. With respect to above conditions, comprehensive counseling provided on pathophysiology; effects on symptoms and comorbidities, diagnostic strategies & limitations; treatment options; risks or no treament; risks & benefits of the various therapeutic options; costs and insurance aspects.     2. I advised weight reduction, avoiding sleeping supine, using alcohol or sedating medications close to bed time and on safe driving practices.    3. Furhter evaluation is indicated and a sleep study will be scheduled.  Patient understands limitations of home sleep testing and in lab study may be necessary.  4.  Patient is willing to try Positive airway pressure therapy and will be scheduled for a titration study if indicated.  5.  Multi component Cognitive behavioral therapy for Insomnia undertaken - Sleep Restriction, Stimulus control, Relaxation techniques and Sleep hygiene " "were discussed.  6.  She is on iron supplement that she takes in the morning before breakfast.  I advised her to take the medication with vitamin C.  Restless leg symptoms should improve as ferritin levels improved.  7. Follow-up to be scheduled after testing/initiation of therapy to review results, further details of treatment options and to adjust therapy.    Sincerely,      Authenticated electronically on 03/25/24   Board Certified Specialist     Portions of the record may have been created with voice recognition software. Occasional wrong word or \"sound a like\" substitutions may have occurred due to the inherent limitations of voice recognition software. There may also be notations and random deletions of words or characters from malfunctioning software. Read the chart carefully and recognize, using context, where substitutions/deletions have occurred.     "

## 2024-03-25 NOTE — PATIENT INSTRUCTIONS
What you can do to improve your sleep: (Sleep Hygiene) Basic rules for a good night's sleep  Create a regular sleep schedule. This will help you form a sleep routine. Keep a record of your sleep patterns, and any sleeping problems you have. Bring the record to follow-up visits with healthcare providers.  Avoid prolonged use of light-emitting screens before bedtime or watching TV in bed.  Avoid forcing sleep.  Do not take naps. Naps could make it hard for you to fall asleep at bedtime.  Deal with your worries before bedtime.  Keep your bedroom cool, quiet, and dark. Turn on white noise, such as a fan, to help you relax. Do not use your bed for any activity that will keep you awake. Do not read, exercise, eat, or watch TV in your bedroom.  Get up if you do not fall asleep within 20 minutes. Move to another room and do something relaxing until you become sleepy.  Limit caffeine, alcohol, nicotine and food to earlier in the day. Only drink caffeine in the morning. Do not drink alcohol within 6 hours of bedtime. Do not eat a heavy meal right before you go to bed. Avoid smoking, especially in the evening.  Exercise regularly. Daily exercise will help you sleep better. Do not exercise within 4 hours of bedtime.  Stimulus control therapy rules  1. Go to bed only when sleepy.  2. Do not watch television, read, eat, or worry while in bed. Use bed only for sleep and sex.  3. Get out of bed if unable to fall asleep within 20 minutes and go to another room. Return to bed only when sleepy. Repeat this step as many times as necessary throughout the night.  4. Set an alarm clock to wake up at a fixed time each morning, including weekends.  5. Do not take a nap during the day.  Data from: Adelita RR, Ronak ML. Nonpharmacologic treatments of insomnia. J Clin Psychiatry 1992; 53:37.  Go to AASM website for more information: Sleepeducation.org  Recommended Reading: Book by authors Priscilla Alvarado   No More sleepless nights    What is  JOEY?   Obstructive sleep apnea is a common and serious sleep disorder that causes you to stop breathing during sleep. The airway repeatedly becomes blocked, limiting the amount of air that reaches your lungs. When this happens, you may snore loudly or making choking noises as you try to breathe. Your brain and body becomes oxygen deprived and you may wake up. This may happen a few times a night, or in more severe cases, several hundred times a night.   Sleep apnea can make you wake up in the morning feeling tired or unrefreshed even though you have had a full night of sleep. During the day, you may feel fatigued, have difficulty concentrating or you may even unintentionally fall asleep. This is because your body is waking up numerous times throughout the night, even though you might not be conscious of each awakening.  The lack of oxygen your body receives can have negative long-term consequences for your health. This includes:  High blood pressure  Heart disease  Irregular heart rhythms  Stroke  Pre-diabetes and diabetes  Depression  Testing  An objective evaluation of your sleep may be needed before your board certified sleep physician can make a diagnosis. Options include:   In-lab overnight sleep study  This type of sleep study requires you to stay overnight at a sleep center, in a bed that may resemble a hotel room. You will sleep with sensors hooked up to various parts of your body. These sensors record your brain waves, heartbeat, breathing and movement. An overnight sleep study also provides your doctor with the most complete information about your sleep. Learn more about an overnight sleep study.   Home sleep apnea test  Some patients with high risk factors for obstructive sleep apnea and no other medical disorders may be candidates for a home sleep apnea test. The testing equipment differs in that it is less complicated than what is used in an overnight sleep study. As such, does not give all the data an  in-lab will and if negative, may not mean you do not have the problem.  Treatment for sleep apnea includes using a continuous positive airway pressure (CPAP) machine to keep your airway open during sleep. A mask is placed over your nose and mouth, or just your nose. The mask is hooked to the CPAP machine that blows a gentle stream of air into the mask when you breathe. This helps keep your airway open so you can breathe more regularly. Extra oxygen may be given to you through the machine. You may be given a mouth device. It looks like a mouth guard or dental retainer and stops your tongue and mouth tissues from blocking your throat while you sleep. Surgery may be needed to remove extra tissues that block your mouth, throat, or nose.  Manage sleep apnea:   Do not smoke. Nicotine and other chemicals in cigarettes and cigars can cause lung damage. Ask your healthcare provider for information if you currently smoke and need help to quit. E-cigarettes or smokeless tobacco still contain nicotine. Talk to your healthcare provider before you use these products.  Do not drink alcohol or take sedative medicine before you go to sleep. Alcohol and sedatives can relax the muscles and tissues around your throat. This can block the airflow to your lungs.  Maintain a healthy weight. Excess tissue around your throat may restrict your breathing. Ask your healthcare provider for information if you need to lose weight.  Sleep on your side or use pillows designed to prevent sleep apnea. This prevents your tongue or other tissues from blocking your throat. You can also raise the head of your bed.  Driving Safety. Refrain from driving when drowsy.   Follow up with your healthcare provider as directed: Write down your questions so you remember to ask them during your visits.  Go to AASM website for more information: Sleepeducation.org  What is JOEY?   Obstructive sleep apnea is a common and serious sleep disorder that causes you to stop  breathing during sleep. The airway repeatedly becomes blocked, limiting the amount of air that reaches your lungs. When this happens, you may snore loudly or making choking noises as you try to breathe. Your brain and body becomes oxygen deprived and you may wake up. This may happen a few times a night, or in more severe cases, several hundred times a night.   Sleep apnea can make you wake up in the morning feeling tired or unrefreshed even though you have had a full night of sleep. During the day, you may feel fatigued, have difficulty concentrating or you may even unintentionally fall asleep. This is because your body is waking up numerous times throughout the night, even though you might not be conscious of each awakening.  The lack of oxygen your body receives can have negative long-term consequences for your health. This includes:  High blood pressure  Heart disease  Irregular heart rhythms  Stroke  Pre-diabetes and diabetes  Depression  Testing  An objective evaluation of your sleep may be needed before your board certified sleep physician can make a diagnosis. Options include:   In-lab overnight sleep study  This type of sleep study requires you to stay overnight at a sleep center, in a bed that may resemble a hotel room. You will sleep with sensors hooked up to various parts of your body. These sensors record your brain waves, heartbeat, breathing and movement. An overnight sleep study also provides your doctor with the most complete information about your sleep. Learn more about an overnight sleep study.   Home sleep apnea test  Some patients with high risk factors for obstructive sleep apnea and no other medical disorders may be candidates for a home sleep apnea test. The testing equipment differs in that it is less complicated than what is used in an overnight sleep study. As such, does not give all the data an in-lab will and if negative, may not mean you do not have the problem.  Treatment for sleep  apnea includes using a continuous positive airway pressure (CPAP) machine to keep your airway open during sleep. A mask is placed over your nose and mouth, or just your nose. The mask is hooked to the CPAP machine that blows a gentle stream of air into the mask when you breathe. This helps keep your airway open so you can breathe more regularly. Extra oxygen may be given to you through the machine. You may be given a mouth device. It looks like a mouth guard or dental retainer and stops your tongue and mouth tissues from blocking your throat while you sleep. Surgery may be needed to remove extra tissues that block your mouth, throat, or nose.  Manage sleep apnea:   Do not smoke. Nicotine and other chemicals in cigarettes and cigars can cause lung damage. Ask your healthcare provider for information if you currently smoke and need help to quit. E-cigarettes or smokeless tobacco still contain nicotine. Talk to your healthcare provider before you use these products.  Do not drink alcohol or take sedative medicine before you go to sleep. Alcohol and sedatives can relax the muscles and tissues around your throat. This can block the airflow to your lungs.  Maintain a healthy weight. Excess tissue around your throat may restrict your breathing. Ask your healthcare provider for information if you need to lose weight.  Sleep on your side or use pillows designed to prevent sleep apnea. This prevents your tongue or other tissues from blocking your throat. You can also raise the head of your bed.  Driving Safety. Refrain from driving when drowsy.   Follow up with your healthcare provider as directed: Write down your questions so you remember to ask them during your visits.  Go to AASM website for more information: Sleepeducation.org  Nursing Support:  When: Monday through Friday 7A-5PM except holidays  Where: Our direct line is [phone].    If you are having a true emergency please call 911.  In the event that the line is  busy or it is after hours please leave a voice message and we will return your call.  Please speak clearly, leaving your full name, birth date, best number to reach you and the reason for your call.   Medication refills: We will need the name of the medication, the dosage, the ordering provider, whether you get a 30 or 90 day refill, and the pharmacy name and address.  Medications will be ordered by the provider only.  Nurses cannot call in prescriptions.  Please allow 7 days for medication refills.  Physician requested updates: If your provider requested that you call with an update after starting medication, please be ready to provide us the medication and dosage, what time you take your medication, the time you attempt to fall asleep, time you fall asleep, when you wake up, and what time you get out of bed.  Sleep Study Results: We will contact you with sleep study results and/or next steps after the physician has reviewed your testing.

## 2024-04-27 DIAGNOSIS — I10 PRIMARY HYPERTENSION: ICD-10-CM

## 2024-04-27 RX ORDER — LISINOPRIL 5 MG/1
5 TABLET ORAL DAILY
Qty: 90 TABLET | Refills: 0 | Status: SHIPPED | OUTPATIENT
Start: 2024-04-27

## 2024-06-10 DIAGNOSIS — E55.9 VITAMIN D DEFICIENCY: ICD-10-CM

## 2024-06-10 DIAGNOSIS — K21.9 GASTROESOPHAGEAL REFLUX DISEASE WITHOUT ESOPHAGITIS: ICD-10-CM

## 2024-06-10 RX ORDER — MELATONIN
4000 DAILY
Qty: 360 TABLET | Refills: 1 | Status: SHIPPED | OUTPATIENT
Start: 2024-06-10

## 2024-06-10 RX ORDER — PANTOPRAZOLE SODIUM 20 MG/1
TABLET, DELAYED RELEASE ORAL
Qty: 90 TABLET | Refills: 1 | Status: SHIPPED | OUTPATIENT
Start: 2024-06-10

## 2024-06-17 ENCOUNTER — OFFICE VISIT (OUTPATIENT)
Dept: FAMILY MEDICINE CLINIC | Facility: CLINIC | Age: 69
End: 2024-06-17

## 2024-06-17 VITALS
OXYGEN SATURATION: 98 % | HEIGHT: 55 IN | HEART RATE: 91 BPM | TEMPERATURE: 97.4 F | BODY MASS INDEX: 45.59 KG/M2 | DIASTOLIC BLOOD PRESSURE: 64 MMHG | SYSTOLIC BLOOD PRESSURE: 110 MMHG | WEIGHT: 197 LBS

## 2024-06-17 DIAGNOSIS — E55.9 VITAMIN D DEFICIENCY: ICD-10-CM

## 2024-06-17 DIAGNOSIS — Z00.00 MEDICARE ANNUAL WELLNESS VISIT, SUBSEQUENT: Primary | ICD-10-CM

## 2024-06-17 DIAGNOSIS — Z13.1 SCREENING FOR DIABETES MELLITUS: ICD-10-CM

## 2024-06-17 DIAGNOSIS — N18.31 STAGE 3A CHRONIC KIDNEY DISEASE (HCC): Chronic | ICD-10-CM

## 2024-06-17 DIAGNOSIS — K59.00 CONSTIPATION, UNSPECIFIED CONSTIPATION TYPE: ICD-10-CM

## 2024-06-17 DIAGNOSIS — N39.46 MIXED STRESS AND URGE URINARY INCONTINENCE: ICD-10-CM

## 2024-06-17 DIAGNOSIS — Z87.898 HISTORY OF PREDIABETES: ICD-10-CM

## 2024-06-17 DIAGNOSIS — E66.01 MORBID OBESITY WITH BMI OF 45.0-49.9, ADULT (HCC): Chronic | ICD-10-CM

## 2024-06-17 DIAGNOSIS — K86.2 PANCREATIC CYST: ICD-10-CM

## 2024-06-17 DIAGNOSIS — K21.9 GASTROESOPHAGEAL REFLUX DISEASE WITHOUT ESOPHAGITIS: ICD-10-CM

## 2024-06-17 DIAGNOSIS — Z87.891 FORMER SMOKER: ICD-10-CM

## 2024-06-17 DIAGNOSIS — D50.8 IRON DEFICIENCY ANEMIA SECONDARY TO INADEQUATE DIETARY IRON INTAKE: ICD-10-CM

## 2024-06-17 DIAGNOSIS — R05.2 SUBACUTE COUGH: ICD-10-CM

## 2024-06-17 DIAGNOSIS — Z12.31 ENCOUNTER FOR SCREENING MAMMOGRAM FOR BREAST CANCER: ICD-10-CM

## 2024-06-17 DIAGNOSIS — I10 PRIMARY HYPERTENSION: Chronic | ICD-10-CM

## 2024-06-17 DIAGNOSIS — E78.2 MIXED HYPERLIPIDEMIA: ICD-10-CM

## 2024-06-17 DIAGNOSIS — Z13.6 SCREENING FOR CARDIOVASCULAR CONDITION: ICD-10-CM

## 2024-06-17 RX ORDER — BENZONATATE 100 MG/1
100 CAPSULE ORAL 3 TIMES DAILY PRN
Qty: 20 CAPSULE | Refills: 0 | Status: SHIPPED | OUTPATIENT
Start: 2024-06-17

## 2024-06-17 RX ORDER — OXYBUTYNIN CHLORIDE 10 MG/1
10 TABLET, EXTENDED RELEASE ORAL DAILY
Qty: 90 TABLET | Refills: 0 | Status: SHIPPED | OUTPATIENT
Start: 2024-06-17

## 2024-06-17 NOTE — ASSESSMENT & PLAN NOTE
Today's BP 98/64 repeat 110/64  Current medication: Lisinopril 5mg  Continue current medication regimen   Advised patient on symptoms of hypotension & severe HTN  Limit salt-intake & caffeine. DASH diet discussed, minimize stress level  Weight reduction efforts via improved diet & increased exercise

## 2024-06-17 NOTE — PATIENT INSTRUCTIONS
Medicare Preventive Visit Patient Instructions  Thank you for completing your Welcome to Medicare Visit or Medicare Annual Wellness Visit today. Your next wellness visit will be due in one year (6/18/2025).  The screening/preventive services that you may require over the next 5-10 years are detailed below. Some tests may not apply to you based off risk factors and/or age. Screening tests ordered at today's visit but not completed yet may show as past due. Also, please note that scanned in results may not display below.  Preventive Screenings:  Service Recommendations Previous Testing/Comments   Colorectal Cancer Screening  * Colonoscopy    * Fecal Occult Blood Test (FOBT)/Fecal Immunochemical Test (FIT)  * Fecal DNA/Cologuard Test  * Flexible Sigmoidoscopy Age: 45-75 years old   Colonoscopy: every 10 years (may be performed more frequently if at higher risk)  OR  FOBT/FIT: every 1 year  OR  Cologuard: every 3 years  OR  Sigmoidoscopy: every 5 years  Screening may be recommended earlier than age 45 if at higher risk for colorectal cancer. Also, an individualized decision between you and your healthcare provider will decide whether screening between the ages of 76-85 would be appropriate. Colonoscopy: 12/13/2023  FOBT/FIT: 12/12/2023  Cologuard: Not on file  Sigmoidoscopy: Not on file    Screening Current     Breast Cancer Screening Age: 40+ years old  Frequency: every 1-2 years  Not required if history of left and right mastectomy Mammogram: 08/31/2022    Screening Current   Cervical Cancer Screening Between the ages of 21-29, pap smear recommended once every 3 years.   Between the ages of 30-65, can perform pap smear with HPV co-testing every 5 years.   Recommendations may differ for women with a history of total hysterectomy, cervical cancer, or abnormal pap smears in past. Pap Smear: 11/05/2018    Screening Not Indicated   Hepatitis C Screening Once for adults born between 1945 and 1965  More frequently in  patients at high risk for Hepatitis C Hep C Antibody: Not on file    Screening Current   Diabetes Screening 1-2 times per year if you're at risk for diabetes or have pre-diabetes Fasting glucose: 108 mg/dL (12/13/2023)  A1C: 5.7 % (11/7/2023)  Screening Current   Cholesterol Screening Once every 5 years if you don't have a lipid disorder. May order more often based on risk factors. Lipid panel: 11/07/2023    Screening Not Indicated  History Lipid Disorder     Other Preventive Screenings Covered by Medicare:  Abdominal Aortic Aneurysm (AAA) Screening: covered once if your at risk. You're considered to be at risk if you have a family history of AAA.  Lung Cancer Screening: covers low dose CT scan once per year if you meet all of the following conditions: (1) Age 55-77; (2) No signs or symptoms of lung cancer; (3) Current smoker or have quit smoking within the last 15 years; (4) You have a tobacco smoking history of at least 20 pack years (packs per day multiplied by number of years you smoked); (5) You get a written order from a healthcare provider.  Glaucoma Screening: covered annually if you're considered high risk: (1) You have diabetes OR (2) Family history of glaucoma OR (3)  aged 50 and older OR (4)  American aged 65 and older  Osteoporosis Screening: covered every 2 years if you meet one of the following conditions: (1) You're estrogen deficient and at risk for osteoporosis based off medical history and other findings; (2) Have a vertebral abnormality; (3) On glucocorticoid therapy for more than 3 months; (4) Have primary hyperparathyroidism; (5) On osteoporosis medications and need to assess response to drug therapy.   Last bone density test (DXA Scan): 11/21/2018.  HIV Screening: covered annually if you're between the age of 15-65. Also covered annually if you are younger than 15 and older than 65 with risk factors for HIV infection. For pregnant patients, it is covered up to 3 times  per pregnancy.    Immunizations:  Immunization Recommendations   Influenza Vaccine Annual influenza vaccination during flu season is recommended for all persons aged >= 6 months who do not have contraindications   Pneumococcal Vaccine   * Pneumococcal conjugate vaccine = PCV13 (Prevnar 13), PCV15 (Vaxneuvance), PCV20 (Prevnar 20)  * Pneumococcal polysaccharide vaccine = PPSV23 (Pneumovax) Adults 19-65 yo with certain risk factors or if 65+ yo  If never received any pneumonia vaccine: recommend Prevnar 20 (PCV20)  Give PCV20 if previously received 1 dose of PCV13 or PPSV23   Hepatitis B Vaccine 3 dose series if at intermediate or high risk (ex: diabetes, end stage renal disease, liver disease)   Respiratory syncytial virus (RSV) Vaccine - COVERED BY MEDICARE PART D  * RSVPreF3 (Arexvy) CDC recommends that adults 60 years of age and older may receive a single dose of RSV vaccine using shared clinical decision-making (SCDM)   Tetanus (Td) Vaccine - COST NOT COVERED BY MEDICARE PART B Following completion of primary series, a booster dose should be given every 10 years to maintain immunity against tetanus. Td may also be given as tetanus wound prophylaxis.   Tdap Vaccine - COST NOT COVERED BY MEDICARE PART B Recommended at least once for all adults. For pregnant patients, recommended with each pregnancy.   Shingles Vaccine (Shingrix) - COST NOT COVERED BY MEDICARE PART B  2 shot series recommended in those 19 years and older who have or will have weakened immune systems or those 50 years and older     Health Maintenance Due:      Topic Date Due   • Breast Cancer Screening: Mammogram  08/31/2024   • Colorectal Cancer Screening  12/10/2033   • Hepatitis C Screening  Completed     Immunizations Due:      Topic Date Due   • COVID-19 Vaccine (4 - 2023-24 season) 09/01/2023   • Influenza Vaccine (Season Ended) 09/01/2024     Advance Directives   What are advance directives?  Advance directives are legal documents that state  your wishes and plans for medical care. These plans are made ahead of time in case you lose your ability to make decisions for yourself. Advance directives can apply to any medical decision, such as the treatments you want, and if you want to donate organs.   What are the types of advance directives?  There are many types of advance directives, and each state has rules about how to use them. You may choose a combination of any of the following:  Living will:  This is a written record of the treatment you want. You can also choose which treatments you do not want, which to limit, and which to stop at a certain time. This includes surgery, medicine, IV fluid, and tube feedings.   Durable power of  for healthcare (DPAHC):  This is a written record that states who you want to make healthcare choices for you when you are unable to make them for yourself. This person, called a proxy, is usually a family member or a friend. You may choose more than 1 proxy.  Do not resuscitate (DNR) order:  A DNR order is used in case your heart stops beating or you stop breathing. It is a request not to have certain forms of treatment, such as CPR. A DNR order may be included in other types of advance directives.  Medical directive:  This covers the care that you want if you are in a coma, near death, or unable to make decisions for yourself. You can list the treatments you want for each condition. Treatment may include pain medicine, surgery, blood transfusions, dialysis, IV or tube feedings, and a ventilator (breathing machine).  Values history:  This document has questions about your views, beliefs, and how you feel and think about life. This information can help others choose the care that you would choose.  Why are advance directives important?  An advance directive helps you control your care. Although spoken wishes may be used, it is better to have your wishes written down. Spoken wishes can be misunderstood, or not  followed. Treatments may be given even if you do not want them. An advance directive may make it easier for your family to make difficult choices about your care.   Weight Management   Why it is important to manage your weight:  Being overweight increases your risk of health conditions such as heart disease, high blood pressure, type 2 diabetes, and certain types of cancer. It can also increase your risk for osteoarthritis, sleep apnea, and other respiratory problems. Aim for a slow, steady weight loss. Even a small amount of weight loss can lower your risk of health problems.  How to lose weight safely:  A safe and healthy way to lose weight is to eat fewer calories and get regular exercise. You can lose up about 1 pound a week by decreasing the number of calories you eat by 500 calories each day.   Healthy meal plan for weight management:  A healthy meal plan includes a variety of foods, contains fewer calories, and helps you stay healthy. A healthy meal plan includes the following:  Eat whole-grain foods more often.  A healthy meal plan should contain fiber. Fiber is the part of grains, fruits, and vegetables that is not broken down by your body. Whole-grain foods are healthy and provide extra fiber in your diet. Some examples of whole-grain foods are whole-wheat breads and pastas, oatmeal, brown rice, and bulgur.  Eat a variety of vegetables every day.  Include dark, leafy greens such as spinach, kale, shahram greens, and mustard greens. Eat yellow and orange vegetables such as carrots, sweet potatoes, and winter squash.   Eat a variety of fruits every day.  Choose fresh or canned fruit (canned in its own juice or light syrup) instead of juice. Fruit juice has very little or no fiber.  Eat low-fat dairy foods.  Drink fat-free (skim) milk or 1% milk. Eat fat-free yogurt and low-fat cottage cheese. Try low-fat cheeses such as mozzarella and other reduced-fat cheeses.  Choose meat and other protein foods that are  low in fat.  Choose beans or other legumes such as split peas or lentils. Choose fish, skinless poultry (chicken or turkey), or lean cuts of red meat (beef or pork). Before you cook meat or poultry, cut off any visible fat.   Use less fat and oil.  Try baking foods instead of frying them. Add less fat, such as margarine, sour cream, regular salad dressing and mayonnaise to foods. Eat fewer high-fat foods. Some examples of high-fat foods include french fries, doughnuts, ice cream, and cakes.  Eat fewer sweets.  Limit foods and drinks that are high in sugar. This includes candy, cookies, regular soda, and sweetened drinks.  Exercise:  Exercise at least 30 minutes per day on most days of the week. Some examples of exercise include walking, biking, dancing, and swimming. You can also fit in more physical activity by taking the stairs instead of the elevator or parking farther away from stores. Ask your healthcare provider about the best exercise plan for you.      © Copyright [employer] 2018 Information is for End User's use only and may not be sold, redistributed or otherwise used for commercial purposes. All illustrations and images included in CareNotes® are the copyrighted property of [employer] or [employer]      Kegel Exercises for Women   AMBULATORY CARE:   Kegel exercises  help strengthen your pelvic muscles. Pelvic muscles hold your pelvic organs, such as your bladder and uterus, in place. Kegel exercises help prevent or control certain conditions, such as urine incontinence (leakage) or uterine prolapse.       Call your doctor or physical therapist if:   You cannot feel your muscles tighten or relax.    You continue to leak urine.    You have questions or concerns about your condition or care.    Use the correct muscles:  Pelvic muscles are the muscles you use to control urine flow. To target these muscles, stop and start the flow of urine several times. This will help you become familiar with  how it feels to tighten and relax these muscles.  How to do Kegel exercises:   Get into a comfortable position.  You may lie down, stand up, or sit down to do these exercises. When you first try to do these exercises, it may be easier if you lie down.    Tighten or squeeze your pelvic muscles slowly.  It may feel like you are trying to hold back urine or gas. Hold this position for 3 seconds. Relax for 3 seconds. Repeat this cycle 10 times. Do not hold your breath when you do Kegel exercises. Keep your stomach, back, and leg muscles relaxed.    Do 10 sets of Kegel exercises, at least 3 times a day.  When you know how to do Kegel exercises, use different positions. This will help to strengthen your pelvic muscles as much as possible. You can do these exercises while you lie on the floor, watch TV, or while you stand. Tighten your pelvic muscles before you sneeze, cough, or lift to prevent urine leakage. You may notice improved bladder control within about 6 weeks.    Follow up with your doctor or physical therapist as directed:  Write down your questions so you remember to ask them during your visits.  © Copyright [employer] 2023 Information is for End User's use only and may not be sold, redistributed or otherwise used for commercial purposes.  The above information is an  only. It is not intended as medical advice for individual conditions or treatments. Talk to your doctor, nurse or pharmacist before following any medical regimen to see if it is safe and effective for you.

## 2024-06-17 NOTE — ASSESSMENT & PLAN NOTE
Lab Results   Component Value Date    EGFR 67 12/13/2023    EGFR 62 12/12/2023    EGFR 65 11/07/2023    CREATININE 0.89 12/13/2023    CREATININE 0.94 12/12/2023    CREATININE 0.91 11/07/2023      POC H Pylori neg per specimen.

## 2024-06-17 NOTE — ASSESSMENT & PLAN NOTE
11/23 Lipid panel   Cholesterol 132, Triglycerides 123, LDL 66, HDL 44  Repeat lipid panel ordered   Current Medications: Pravastatin 10mg   Continue current medication regimen   Lifestyle modification: Incorporate regular exercise, avoid sugars and simple carbohydrates, weight loss and choosing healthier fats.

## 2024-06-17 NOTE — ASSESSMENT & PLAN NOTE
Results from Last 12 Months   Lab Units 12/13/23  0528 12/12/23  0300   RBC Million/uL 4.61 3.80*   HEMATOCRIT % 30.6* 23.8*   HEMOGLOBIN g/dL 8.2* 5.8*   MCV fL 66* 63*   MCH pg 17.8* 15.3*   IRON ug/dL  --  10*   FERRITIN ng/mL  --  2*      Current medication: Ferrous sulfate 324mg and Vitamin C   Repeat CBC and iron panel ordered

## 2024-06-17 NOTE — PROGRESS NOTES
Ambulatory Visit  Name: Diana Conner      : 1955      MRN: [id number]  Encounter Provider: BRIE Hinojosa  Encounter Date: 2024   Encounter department: [facility]    Assessment & Plan   1. Medicare annual wellness visit, subsequent  2. Primary hypertension  Assessment & Plan:  Today's BP 98/64 repeat 110/64  Current medication: Lisinopril 5mg  Continue current medication regimen   Advised patient on symptoms of hypotension & severe HTN  Limit salt-intake & caffeine. DASH diet discussed, minimize stress level  Weight reduction efforts via improved diet & increased exercise  3. Gastroesophageal reflux disease without esophagitis  Assessment & Plan:  Current medication: pantoprazole 20mg   Continue current medication regimen   4. Pancreatic cyst  Assessment & Plan:   CT Ab/Pelvis  1.6 cm cystic lesion pancreatic body.   For simple cyst(s) between 1.5 to 2.0 cm, recommend followup every 6 months for 4 times, then every 1 year for 2 times, then every 2 years for 3 times. If stable after 10 years, then no more followups. (If patient reaches age 80, then can switch to age >80 algorithm.) Recommend next followup in 6 months. Preferred imaging modality: abdomen MRI and MRCP with and without IV contrast, or triple phase abdomen CT with IV contrast.   Plan to order scan at follow up appointment in 2024   CT Ab/pelvis ordered   Orders:  -     CT abdomen pelvis w contrast; Future; Expected date: 2024  -     Hemoglobin A1C; Future  5. History of prediabetes  Assessment & Plan:   A1C 5.7   Repeat A1C ordered   Orders:  -     Hemoglobin A1C; Future  6. Stage 3a chronic kidney disease (HCC)  Assessment & Plan:  Lab Results   Component Value Date    EGFR 67 2023    EGFR 62 2023    EGFR 65 2023    CREATININE 0.89 2023    CREATININE 0.94 2023    CREATININE 0.91 2023     Orders:  -     Comprehensive metabolic panel; Future  7.  Former smoker  Assessment & Plan:  Former 1 PPD smoker   Quit smoking 8 years ago   Still exposed to second hand smoke.   8. Iron deficiency anemia secondary to inadequate dietary iron intake  Assessment & Plan:  Results from Last 12 Months   Lab Units 12/13/23  0528 12/12/23  0300   RBC Million/uL 4.61 3.80*   HEMATOCRIT % 30.6* 23.8*   HEMOGLOBIN g/dL 8.2* 5.8*   MCV fL 66* 63*   MCH pg 17.8* 15.3*   IRON ug/dL  --  10*   FERRITIN ng/mL  --  2*      Current medication: Ferrous sulfate 324mg and Vitamin C   Repeat CBC and iron panel ordered   Orders:  -     CBC and differential; Future  -     Iron Panel (Includes Ferritin, Iron Sat%, Iron, and TIBC); Future  9. Constipation, unspecified constipation type  Assessment & Plan:  Resolved   Previously on docusate   10. Mixed hyperlipidemia  Assessment & Plan:  11/23 Lipid panel   Cholesterol 132, Triglycerides 123, LDL 66, HDL 44  Repeat lipid panel ordered   Current Medications: Pravastatin 10mg   Continue current medication regimen   Lifestyle modification: Incorporate regular exercise, avoid sugars and simple carbohydrates, weight loss and choosing healthier fats.    11. Morbid obesity with BMI of 45.0-49.9, adult (HCC)  -     Hemoglobin A1C; Future  12. Vitamin D deficiency  Assessment & Plan:  02/23 Vitamin D level 15.7  Current Medication: Cholecalciferol 1000 units   13. Mixed stress and urge urinary incontinence  Assessment & Plan:  Current medication: Oxybutynin- XL 10mg   Continue current medication regimen   Orders:  -     oxybutynin (DITROPAN-XL) 10 MG 24 hr tablet; Take 1 tablet (10 mg total) by mouth daily  14. Subacute cough  Assessment & Plan:  Recurrent cough   Benzonatate ordered   Orders:  -     benzonatate (TESSALON PERLES) 100 mg capsule; Take 1 capsule (100 mg total) by mouth 3 (three) times a day as needed for cough  15. Screening for diabetes mellitus  -     Hemoglobin A1C; Future  16. Screening for cardiovascular condition  -     Lipid panel;  Future  17. Encounter for screening mammogram for breast cancer  -     Mammo screening bilateral w 3d & cad; Future       Preventive health issues were discussed with patient, and age appropriate screening tests were ordered as noted in patient's After Visit Summary. Personalized health advice and appropriate referrals for health education or preventive services given if needed, as noted in patient's After Visit Summary.    History of Present Illness     Annual exam, follow up on chronic conditions. Does have a recurrent cough post covid.       Patient Care Team:  BRIE Hinojosa as PCP - General (Nurse Practitioner)  Mal Awan MD as PCP - PCP-[employer] (RTE)  Darien Downs MD as PCP - PCP-[facility] (RTE)  Darien Downs MD as PCP - PCP-[facility] (RTE)    Review of Systems   Constitutional:  Negative for activity change, chills, fatigue and fever.   HENT:  Negative for congestion, ear pain, rhinorrhea, sore throat and trouble swallowing.    Eyes:  Negative for pain and visual disturbance.   Respiratory:  Negative for cough, chest tightness and shortness of breath.    Cardiovascular:  Negative for chest pain, palpitations and leg swelling.   Gastrointestinal:  Negative for abdominal pain, constipation, diarrhea, nausea and vomiting.   Genitourinary:  Negative for difficulty urinating, dysuria, hematuria and urgency.   Musculoskeletal:  Negative for arthralgias and back pain.   Skin:  Negative for color change and rash.   Neurological:  Negative for dizziness, seizures, syncope and headaches.   Psychiatric/Behavioral:  Negative for dysphoric mood. The patient is not nervous/anxious.    All other systems reviewed and are negative.    Medical History Reviewed by provider this encounter:  Tobacco  Allergies  Meds  Problems  Med Hx  Surg Hx  Fam Hx       Annual Wellness Visit Questionnaire   Diana is here for her Subsequent Wellness visit. Last Medicare Wellness visit  information reviewed, patient interviewed and updates made to the record today.      Health Risk Assessment:   Patient rates overall health as good. Patient feels that their physical health rating is same. Patient is satisfied with their life. Eyesight was rated as same. Hearing was rated as same. Patient feels that their emotional and mental health rating is same. Patients states they are never, rarely angry. Patient states they are always unusually tired/fatigued. Patient states that she has experienced no weight loss or gain in last 6 months.     Depression Screening:   PHQ-2 Score: 0      Fall Risk Screening:   In the past year, patient has experienced: no history of falling in past year      Urinary Incontinence Screening:   Patient has not leaked urine accidently in the last six months.     Home Safety:  Patient does not have trouble with stairs inside or outside of their home. Patient has working smoke alarms and has working carbon monoxide detector. Home safety hazards include: none.     Nutrition:   Current diet is Regular.     Medications:   Patient is currently taking over-the-counter supplements. OTC medications include: see medication list. Patient is able to manage medications.     Activities of Daily Living (ADLs)/Instrumental Activities of Daily Living (IADLs):   Walk and transfer into and out of bed and chair?: Yes  Dress and groom yourself?: Yes    Bathe or shower yourself?: Yes    Feed yourself? Yes  Do your laundry/housekeeping?: Yes  Manage your money, pay your bills and track your expenses?: Yes  Make your own meals?: Yes    Do your own shopping?: Yes    Previous Hospitalizations:   Any hospitalizations or ED visits within the last 12 months?: No      Advance Care Planning:   Living will: No      PREVENTIVE SCREENINGS      Cardiovascular Screening:    General: Screening Not Indicated and History Lipid Disorder      Diabetes Screening:     General: Screening Current      Colorectal Cancer  Screening:     General: Screening Current      Breast Cancer Screening:     General: Screening Current      Cervical Cancer Screening:    General: Screening Not Indicated      Lung Cancer Screening:     General: Screening Not Indicated      Hepatitis C Screening:    General: Screening Current    Screening, Brief Intervention, and Referral to Treatment (SBIRT)    Screening  Typical number of drinks in a day: 0  Typical number of drinks in a week: 0  Interpretation: Low risk drinking behavior.    Single Item Drug Screening:  How often have you used an illegal drug (including marijuana) or a prescription medication for non-medical reasons in the past year? never    Single Item Drug Screen Score: 0  Interpretation: Negative screen for possible drug use disorder    Time Spent  Time spent screening/evaluating the patient for alcohol misuse: 0 minutes. Time spent providing alcohol/substance abuse assessment and intervention services: 0 minutes.    Annual Depression Screening  Time spent screening and evaluating the patient for depression during today's encounter was 5 minutes.    Other Counseling Topics:   Car/seat belt/driving safety, skin self-exam, sunscreen and calcium and vitamin D intake and regular weightbearing exercise.     Social Determinants of Health     Financial Resource Strain: Low Risk  (1/16/2023)    Overall Financial Resource Strain (CARDIA)     Difficulty of Paying Living Expenses: Not hard at all   Food Insecurity: No Food Insecurity (6/17/2024)    Hunger Vital Sign     Worried About Running Out of Food in the Last Year: Never true     Ran Out of Food in the Last Year: Never true   Transportation Needs: No Transportation Needs (6/17/2024)    PRAPARE - Transportation     Lack of Transportation (Medical): No     Lack of Transportation (Non-Medical): No   Housing Stability: Low Risk  (6/17/2024)    Housing Stability Vital Sign     Unable to Pay for Housing in the Last Year: No     Number of Times Moved in  "the Last Year: 1     Homeless in the Last Year: No   Utilities: Not At Risk (6/17/2024)    [facility] Utilities     Threatened with loss of utilities: No     No results found.    Objective     /64 (BP Location: Left arm, Patient Position: Sitting, Cuff Size: Large)   Pulse 91   Temp (!) 97.4 °F (36.3 °C)   Ht 4' 7\" (1.397 m)   Wt 89.4 kg (197 lb)   SpO2 98%   BMI 45.79 kg/m²     Physical Exam  Vitals and nursing note reviewed.   Constitutional:       General: She is not in acute distress.     Appearance: Normal appearance. She is well-developed and normal weight.   HENT:      Head: Normocephalic and atraumatic.      Right Ear: Tympanic membrane, ear canal and external ear normal. There is no impacted cerumen.      Left Ear: Tympanic membrane, ear canal and external ear normal. There is no impacted cerumen.      Nose: Nose normal.      Mouth/Throat:      Mouth: Mucous membranes are moist.      Pharynx: Oropharynx is clear.   Eyes:      Extraocular Movements: Extraocular movements intact.      Conjunctiva/sclera: Conjunctivae normal.      Pupils: Pupils are equal, round, and reactive to light.   Cardiovascular:      Rate and Rhythm: Normal rate and regular rhythm.      Pulses: Normal pulses.      Heart sounds: Normal heart sounds. No murmur heard.  Pulmonary:      Effort: Pulmonary effort is normal. No respiratory distress.      Breath sounds: Normal breath sounds.   Abdominal:      General: Bowel sounds are normal.      Palpations: Abdomen is soft.      Tenderness: There is no abdominal tenderness.   Musculoskeletal:         General: Normal range of motion.      Cervical back: Normal range of motion and neck supple.      Right lower leg: No edema.      Left lower leg: No edema.   Skin:     General: Skin is warm and dry.      Capillary Refill: Capillary refill takes less than 2 seconds.   Neurological:      General: No focal deficit present.      Mental Status: She is alert and oriented to person, place, and " time. Mental status is at baseline.   Psychiatric:         Mood and Affect: Mood normal.         Behavior: Behavior normal.         Thought Content: Thought content normal.         Judgment: Judgment normal.       Administrative Statements     Patient Instructions   Medicare Preventive Visit Patient Instructions  Thank you for completing your Welcome to Medicare Visit or Medicare Annual Wellness Visit today. Your next wellness visit will be due in one year (6/18/2025).  The screening/preventive services that you may require over the next 5-10 years are detailed below. Some tests may not apply to you based off risk factors and/or age. Screening tests ordered at today's visit but not completed yet may show as past due. Also, please note that scanned in results may not display below.  Preventive Screenings:  Service Recommendations Previous Testing/Comments   Colorectal Cancer Screening  * Colonoscopy    * Fecal Occult Blood Test (FOBT)/Fecal Immunochemical Test (FIT)  * Fecal DNA/Cologuard Test  * Flexible Sigmoidoscopy Age: 45-75 years old   Colonoscopy: every 10 years (may be performed more frequently if at higher risk)  OR  FOBT/FIT: every 1 year  OR  Cologuard: every 3 years  OR  Sigmoidoscopy: every 5 years  Screening may be recommended earlier than age 45 if at higher risk for colorectal cancer. Also, an individualized decision between you and your healthcare provider will decide whether screening between the ages of 76-85 would be appropriate. Colonoscopy: 12/13/2023  FOBT/FIT: 12/12/2023  Cologuard: Not on file  Sigmoidoscopy: Not on file    Screening Current     Breast Cancer Screening Age: 40+ years old  Frequency: every 1-2 years  Not required if history of left and right mastectomy Mammogram: 08/31/2022    Screening Current   Cervical Cancer Screening Between the ages of 21-29, pap smear recommended once every 3 years.   Between the ages of 30-65, can perform pap smear with HPV co-testing every 5 years.    Recommendations may differ for women with a history of total hysterectomy, cervical cancer, or abnormal pap smears in past. Pap Smear: 11/05/2018    Screening Not Indicated   Hepatitis C Screening Once for adults born between 1945 and 1965  More frequently in patients at high risk for Hepatitis C Hep C Antibody: Not on file    Screening Current   Diabetes Screening 1-2 times per year if you're at risk for diabetes or have pre-diabetes Fasting glucose: 108 mg/dL (12/13/2023)  A1C: 5.7 % (11/7/2023)  Screening Current   Cholesterol Screening Once every 5 years if you don't have a lipid disorder. May order more often based on risk factors. Lipid panel: 11/07/2023    Screening Not Indicated  History Lipid Disorder     Other Preventive Screenings Covered by Medicare:  Abdominal Aortic Aneurysm (AAA) Screening: covered once if your at risk. You're considered to be at risk if you have a family history of AAA.  Lung Cancer Screening: covers low dose CT scan once per year if you meet all of the following conditions: (1) Age 55-77; (2) No signs or symptoms of lung cancer; (3) Current smoker or have quit smoking within the last 15 years; (4) You have a tobacco smoking history of at least 20 pack years (packs per day multiplied by number of years you smoked); (5) You get a written order from a healthcare provider.  Glaucoma Screening: covered annually if you're considered high risk: (1) You have diabetes OR (2) Family history of glaucoma OR (3)  aged 50 and older OR (4)  American aged 65 and older  Osteoporosis Screening: covered every 2 years if you meet one of the following conditions: (1) You're estrogen deficient and at risk for osteoporosis based off medical history and other findings; (2) Have a vertebral abnormality; (3) On glucocorticoid therapy for more than 3 months; (4) Have primary hyperparathyroidism; (5) On osteoporosis medications and need to assess response to drug therapy.   Last bone  density test (DXA Scan): 11/21/2018.  HIV Screening: covered annually if you're between the age of 15-65. Also covered annually if you are younger than 15 and older than 65 with risk factors for HIV infection. For pregnant patients, it is covered up to 3 times per pregnancy.    Immunizations:  Immunization Recommendations   Influenza Vaccine Annual influenza vaccination during flu season is recommended for all persons aged >= 6 months who do not have contraindications   Pneumococcal Vaccine   * Pneumococcal conjugate vaccine = PCV13 (Prevnar 13), PCV15 (Vaxneuvance), PCV20 (Prevnar 20)  * Pneumococcal polysaccharide vaccine = PPSV23 (Pneumovax) Adults 19-65 yo with certain risk factors or if 65+ yo  If never received any pneumonia vaccine: recommend Prevnar 20 (PCV20)  Give PCV20 if previously received 1 dose of PCV13 or PPSV23   Hepatitis B Vaccine 3 dose series if at intermediate or high risk (ex: diabetes, end stage renal disease, liver disease)   Respiratory syncytial virus (RSV) Vaccine - COVERED BY MEDICARE PART D  * RSVPreF3 (Arexvy) CDC recommends that adults 60 years of age and older may receive a single dose of RSV vaccine using shared clinical decision-making (SCDM)   Tetanus (Td) Vaccine - COST NOT COVERED BY MEDICARE PART B Following completion of primary series, a booster dose should be given every 10 years to maintain immunity against tetanus. Td may also be given as tetanus wound prophylaxis.   Tdap Vaccine - COST NOT COVERED BY MEDICARE PART B Recommended at least once for all adults. For pregnant patients, recommended with each pregnancy.   Shingles Vaccine (Shingrix) - COST NOT COVERED BY MEDICARE PART B  2 shot series recommended in those 19 years and older who have or will have weakened immune systems or those 50 years and older     Health Maintenance Due:      Topic Date Due    Breast Cancer Screening: Mammogram  08/31/2024    Colorectal Cancer Screening  12/10/2033    Hepatitis C Screening   Completed     Immunizations Due:      Topic Date Due    COVID-19 Vaccine (4 - 2023-24 season) 09/01/2023    Influenza Vaccine (Season Ended) 09/01/2024     Advance Directives   What are advance directives?  Advance directives are legal documents that state your wishes and plans for medical care. These plans are made ahead of time in case you lose your ability to make decisions for yourself. Advance directives can apply to any medical decision, such as the treatments you want, and if you want to donate organs.   What are the types of advance directives?  There are many types of advance directives, and each state has rules about how to use them. You may choose a combination of any of the following:  Living will:  This is a written record of the treatment you want. You can also choose which treatments you do not want, which to limit, and which to stop at a certain time. This includes surgery, medicine, IV fluid, and tube feedings.   Durable power of  for healthcare (DPAHC):  This is a written record that states who you want to make healthcare choices for you when you are unable to make them for yourself. This person, called a proxy, is usually a family member or a friend. You may choose more than 1 proxy.  Do not resuscitate (DNR) order:  A DNR order is used in case your heart stops beating or you stop breathing. It is a request not to have certain forms of treatment, such as CPR. A DNR order may be included in other types of advance directives.  Medical directive:  This covers the care that you want if you are in a coma, near death, or unable to make decisions for yourself. You can list the treatments you want for each condition. Treatment may include pain medicine, surgery, blood transfusions, dialysis, IV or tube feedings, and a ventilator (breathing machine).  Values history:  This document has questions about your views, beliefs, and how you feel and think about life. This information can help others  choose the care that you would choose.  Why are advance directives important?  An advance directive helps you control your care. Although spoken wishes may be used, it is better to have your wishes written down. Spoken wishes can be misunderstood, or not followed. Treatments may be given even if you do not want them. An advance directive may make it easier for your family to make difficult choices about your care.   Weight Management   Why it is important to manage your weight:  Being overweight increases your risk of health conditions such as heart disease, high blood pressure, type 2 diabetes, and certain types of cancer. It can also increase your risk for osteoarthritis, sleep apnea, and other respiratory problems. Aim for a slow, steady weight loss. Even a small amount of weight loss can lower your risk of health problems.  How to lose weight safely:  A safe and healthy way to lose weight is to eat fewer calories and get regular exercise. You can lose up about 1 pound a week by decreasing the number of calories you eat by 500 calories each day.   Healthy meal plan for weight management:  A healthy meal plan includes a variety of foods, contains fewer calories, and helps you stay healthy. A healthy meal plan includes the following:  Eat whole-grain foods more often.  A healthy meal plan should contain fiber. Fiber is the part of grains, fruits, and vegetables that is not broken down by your body. Whole-grain foods are healthy and provide extra fiber in your diet. Some examples of whole-grain foods are whole-wheat breads and pastas, oatmeal, brown rice, and bulgur.  Eat a variety of vegetables every day.  Include dark, leafy greens such as spinach, kale, shahram greens, and mustard greens. Eat yellow and orange vegetables such as carrots, sweet potatoes, and winter squash.   Eat a variety of fruits every day.  Choose fresh or canned fruit (canned in its own juice or light syrup) instead of juice. Fruit juice has  very little or no fiber.  Eat low-fat dairy foods.  Drink fat-free (skim) milk or 1% milk. Eat fat-free yogurt and low-fat cottage cheese. Try low-fat cheeses such as mozzarella and other reduced-fat cheeses.  Choose meat and other protein foods that are low in fat.  Choose beans or other legumes such as split peas or lentils. Choose fish, skinless poultry (chicken or turkey), or lean cuts of red meat (beef or pork). Before you cook meat or poultry, cut off any visible fat.   Use less fat and oil.  Try baking foods instead of frying them. Add less fat, such as margarine, sour cream, regular salad dressing and mayonnaise to foods. Eat fewer high-fat foods. Some examples of high-fat foods include french fries, doughnuts, ice cream, and cakes.  Eat fewer sweets.  Limit foods and drinks that are high in sugar. This includes candy, cookies, regular soda, and sweetened drinks.  Exercise:  Exercise at least 30 minutes per day on most days of the week. Some examples of exercise include walking, biking, dancing, and swimming. You can also fit in more physical activity by taking the stairs instead of the elevator or parking farther away from stores. Ask your healthcare provider about the best exercise plan for you.      © Copyright [employer] 2018 Information is for End User's use only and may not be sold, redistributed or otherwise used for commercial purposes. All illustrations and images included in CareNotes® are the copyrighted property of [employer]M., Inc[employer] or [employer]      Kegel Exercises for Women   AMBULATORY CARE:   Kegel exercises  help strengthen your pelvic muscles. Pelvic muscles hold your pelvic organs, such as your bladder and uterus, in place. Kegel exercises help prevent or control certain conditions, such as urine incontinence (leakage) or uterine prolapse.       Call your doctor or physical therapist if:   You cannot feel your muscles tighten or relax.    You continue to leak urine.    You have  questions or concerns about your condition or care.    Use the correct muscles:  Pelvic muscles are the muscles you use to control urine flow. To target these muscles, stop and start the flow of urine several times. This will help you become familiar with how it feels to tighten and relax these muscles.  How to do Kegel exercises:   Get into a comfortable position.  You may lie down, stand up, or sit down to do these exercises. When you first try to do these exercises, it may be easier if you lie down.    Tighten or squeeze your pelvic muscles slowly.  It may feel like you are trying to hold back urine or gas. Hold this position for 3 seconds. Relax for 3 seconds. Repeat this cycle 10 times. Do not hold your breath when you do Kegel exercises. Keep your stomach, back, and leg muscles relaxed.    Do 10 sets of Kegel exercises, at least 3 times a day.  When you know how to do Kegel exercises, use different positions. This will help to strengthen your pelvic muscles as much as possible. You can do these exercises while you lie on the floor, watch TV, or while you stand. Tighten your pelvic muscles before you sneeze, cough, or lift to prevent urine leakage. You may notice improved bladder control within about 6 weeks.    Follow up with your doctor or physical therapist as directed:  Write down your questions so you remember to ask them during your visits.  © Copyright [employer] 2023 Information is for End User's use only and may not be sold, redistributed or otherwise used for commercial purposes.  The above information is an  only. It is not intended as medical advice for individual conditions or treatments. Talk to your doctor, nurse or pharmacist before following any medical regimen to see if it is safe and effective for you.

## 2024-06-17 NOTE — ASSESSMENT & PLAN NOTE
12/23 CT Ab/Pelvis  1.6 cm cystic lesion pancreatic body.   For simple cyst(s) between 1.5 to 2.0 cm, recommend followup every 6 months for 4 times, then every 1 year for 2 times, then every 2 years for 3 times. If stable after 10 years, then no more followups. (If patient reaches age 80, then can switch to age >80 algorithm.) Recommend next followup in 6 months. Preferred imaging modality: abdomen MRI and MRCP with and without IV contrast, or triple phase abdomen CT with IV contrast.   Plan to order scan at follow up appointment in June 2024   CT Ab/pelvis ordered

## 2024-07-09 ENCOUNTER — HOSPITAL ENCOUNTER (OUTPATIENT)
Dept: CT IMAGING | Facility: HOSPITAL | Age: 69
Discharge: HOME | End: 2024-07-09
Payer: COMMERCIAL

## 2024-07-09 ENCOUNTER — APPOINTMENT (OUTPATIENT)
Dept: LAB | Facility: HOSPITAL | Age: 69
End: 2024-07-09
Payer: COMMERCIAL

## 2024-07-09 DIAGNOSIS — K86.2 PANCREATIC CYST: ICD-10-CM

## 2024-07-09 DIAGNOSIS — D50.8 IRON DEFICIENCY ANEMIA SECONDARY TO INADEQUATE DIETARY IRON INTAKE: Primary | ICD-10-CM

## 2024-07-09 DIAGNOSIS — N18.31 STAGE 3A CHRONIC KIDNEY DISEASE (HCC): Chronic | ICD-10-CM

## 2024-07-09 DIAGNOSIS — D50.8 IRON DEFICIENCY ANEMIA SECONDARY TO INADEQUATE DIETARY IRON INTAKE: ICD-10-CM

## 2024-07-09 DIAGNOSIS — E66.01 MORBID OBESITY WITH BMI OF 45.0-49.9, ADULT (HCC): Chronic | ICD-10-CM

## 2024-07-09 DIAGNOSIS — Z13.6 SCREENING FOR CARDIOVASCULAR CONDITION: ICD-10-CM

## 2024-07-09 DIAGNOSIS — Z87.898 HISTORY OF PREDIABETES: ICD-10-CM

## 2024-07-09 DIAGNOSIS — Z13.1 SCREENING FOR DIABETES MELLITUS: ICD-10-CM

## 2024-07-09 LAB
ALBUMIN SERPL BCG-MCNC: 4.2 G/DL (ref 3.5–5)
ALP SERPL-CCNC: 59 U/L (ref 34–104)
ALT SERPL W P-5'-P-CCNC: 15 U/L (ref 7–52)
ANION GAP SERPL CALCULATED.3IONS-SCNC: 11 MMOL/L (ref 4–13)
AST SERPL W P-5'-P-CCNC: 19 U/L (ref 13–39)
BASOPHILS # BLD AUTO: 0.04 THOUSANDS/ÂΜL (ref 0–0.1)
BASOPHILS NFR BLD AUTO: 1 % (ref 0–1)
BILIRUB SERPL-MCNC: 0.57 MG/DL (ref 0.2–1)
BUN SERPL-MCNC: 18 MG/DL (ref 5–25)
CALCIUM SERPL-MCNC: 9.6 MG/DL (ref 8.4–10.2)
CHLORIDE SERPL-SCNC: 104 MMOL/L (ref 96–108)
CHOLEST SERPL-MCNC: 161 MG/DL
CO2 SERPL-SCNC: 24 MMOL/L (ref 21–32)
CREAT SERPL-MCNC: 1.02 MG/DL (ref 0.6–1.3)
EOSINOPHIL # BLD AUTO: 0.18 THOUSAND/ÂΜL (ref 0–0.61)
EOSINOPHIL NFR BLD AUTO: 3 % (ref 0–6)
ERYTHROCYTE [DISTWIDTH] IN BLOOD BY AUTOMATED COUNT: 18.7 % (ref 11.6–15.1)
EST. AVERAGE GLUCOSE BLD GHB EST-MCNC: 117 MG/DL
FERRITIN SERPL-MCNC: 3 NG/ML (ref 11–307)
GFR SERPL CREATININE-BSD FRML MDRD: 57 ML/MIN/1.73SQ M
GLUCOSE P FAST SERPL-MCNC: 106 MG/DL (ref 65–99)
HBA1C MFR BLD: 5.7 %
HCT VFR BLD AUTO: 33.5 % (ref 34.8–46.1)
HDLC SERPL-MCNC: 63 MG/DL
HGB BLD-MCNC: 8.9 G/DL (ref 11.5–15.4)
IMM GRANULOCYTES # BLD AUTO: 0.03 THOUSAND/UL (ref 0–0.2)
IMM GRANULOCYTES NFR BLD AUTO: 0 % (ref 0–2)
IRON SATN MFR SERPL: 3 % (ref 15–50)
IRON SERPL-MCNC: 31 UG/DL (ref 50–212)
LDLC SERPL CALC-MCNC: 64 MG/DL (ref 0–100)
LYMPHOCYTES # BLD AUTO: 1.17 THOUSANDS/ÂΜL (ref 0.6–4.47)
LYMPHOCYTES NFR BLD AUTO: 17 % (ref 14–44)
MCH RBC QN AUTO: 18.9 PG (ref 26.8–34.3)
MCHC RBC AUTO-ENTMCNC: 26.6 G/DL (ref 31.4–37.4)
MCV RBC AUTO: 71 FL (ref 82–98)
MONOCYTES # BLD AUTO: 0.38 THOUSAND/ÂΜL (ref 0.17–1.22)
MONOCYTES NFR BLD AUTO: 6 % (ref 4–12)
NEUTROPHILS # BLD AUTO: 5.1 THOUSANDS/ÂΜL (ref 1.85–7.62)
NEUTS SEG NFR BLD AUTO: 73 % (ref 43–75)
NONHDLC SERPL-MCNC: 98 MG/DL
NRBC BLD AUTO-RTO: 0 /100 WBCS
PLATELET # BLD AUTO: 246 THOUSANDS/UL (ref 149–390)
PMV BLD AUTO: 10.4 FL (ref 8.9–12.7)
POTASSIUM SERPL-SCNC: 4.4 MMOL/L (ref 3.5–5.3)
PROT SERPL-MCNC: 7.7 G/DL (ref 6.4–8.4)
RBC # BLD AUTO: 4.72 MILLION/UL (ref 3.81–5.12)
SODIUM SERPL-SCNC: 139 MMOL/L (ref 135–147)
TIBC SERPL-MCNC: 888 UG/DL (ref 250–450)
TRIGL SERPL-MCNC: 171 MG/DL
UIBC SERPL-MCNC: 857 UG/DL (ref 155–355)
WBC # BLD AUTO: 6.9 THOUSAND/UL (ref 4.31–10.16)

## 2024-07-09 PROCEDURE — 83540 ASSAY OF IRON: CPT

## 2024-07-09 PROCEDURE — 85025 COMPLETE CBC W/AUTO DIFF WBC: CPT

## 2024-07-09 PROCEDURE — 80053 COMPREHEN METABOLIC PANEL: CPT

## 2024-07-09 PROCEDURE — 74177 CT ABD & PELVIS W/CONTRAST: CPT

## 2024-07-09 PROCEDURE — 80061 LIPID PANEL: CPT

## 2024-07-09 PROCEDURE — 83036 HEMOGLOBIN GLYCOSYLATED A1C: CPT

## 2024-07-09 PROCEDURE — 82728 ASSAY OF FERRITIN: CPT

## 2024-07-09 PROCEDURE — 36415 COLL VENOUS BLD VENIPUNCTURE: CPT

## 2024-07-09 PROCEDURE — 83550 IRON BINDING TEST: CPT

## 2024-07-09 RX ADMIN — IOHEXOL 90 ML: 350 INJECTION, SOLUTION INTRAVENOUS at 10:53

## 2024-07-11 ENCOUNTER — TELEPHONE (OUTPATIENT)
Age: 69
End: 2024-07-11

## 2024-07-11 NOTE — TELEPHONE ENCOUNTER
Pt received a call from [employer] about her referral and had questions about it. I gave pt the phone number for [employer] and informed her of the below message from Dr. Ahmadi.     BRIE Hinojosa  7/9/2024 12:50 PM EDT       Call patient. CBC is still showing iron deficiency. I would like to send to hematology for further evaluation. Referral placed.     Result note can be completed.

## 2024-07-17 PROBLEM — R05.2 SUBACUTE COUGH: Status: RESOLVED | Noted: 2023-12-26 | Resolved: 2024-07-17

## 2024-08-07 DIAGNOSIS — D64.9 ANEMIA: ICD-10-CM

## 2024-08-08 RX ORDER — FERROUS SULFATE 324(65)MG
324 TABLET, DELAYED RELEASE (ENTERIC COATED) ORAL
Qty: 60 TABLET | Refills: 3 | Status: SHIPPED | OUTPATIENT
Start: 2024-08-08

## 2024-08-20 ENCOUNTER — TELEPHONE (OUTPATIENT)
Dept: HEMATOLOGY ONCOLOGY | Facility: CLINIC | Age: 69
End: 2024-08-20

## 2024-08-20 ENCOUNTER — OFFICE VISIT (OUTPATIENT)
Dept: HEMATOLOGY ONCOLOGY | Facility: CLINIC | Age: 69
End: 2024-08-20
Payer: COMMERCIAL

## 2024-08-20 VITALS
BODY MASS INDEX: 46.4 KG/M2 | DIASTOLIC BLOOD PRESSURE: 80 MMHG | HEART RATE: 85 BPM | TEMPERATURE: 98 F | OXYGEN SATURATION: 96 % | WEIGHT: 200.5 LBS | SYSTOLIC BLOOD PRESSURE: 120 MMHG | RESPIRATION RATE: 18 BRPM | HEIGHT: 55 IN

## 2024-08-20 DIAGNOSIS — D50.8 OTHER IRON DEFICIENCY ANEMIA: ICD-10-CM

## 2024-08-20 DIAGNOSIS — D64.9 SYMPTOMATIC ANEMIA: Primary | ICD-10-CM

## 2024-08-20 DIAGNOSIS — E53.8 DEFICIENCY OF OTHER SPECIFIED B GROUP VITAMINS: ICD-10-CM

## 2024-08-20 PROBLEM — D50.9 IRON DEFICIENCY ANEMIA, UNSPECIFIED: Status: ACTIVE | Noted: 2024-08-20

## 2024-08-20 PROCEDURE — 99204 OFFICE O/P NEW MOD 45 MIN: CPT | Performed by: PHYSICIAN ASSISTANT

## 2024-08-20 RX ORDER — SODIUM CHLORIDE 9 MG/ML
20 INJECTION, SOLUTION INTRAVENOUS ONCE
OUTPATIENT
Start: 2024-08-27

## 2024-08-20 NOTE — PATIENT INSTRUCTIONS
[facility] Medical Oncology and Hematology Team  [place] - [phone]    Your Team Member:  Advanced Practitioner:  Kavya Alcocer PA-C    Please answer Private and Unavailable Calls - this may be your team(s) contacting you.  If you have medical questions/concerns/issues - contact us either by (1) My Chart (2) [facility]

## 2024-08-20 NOTE — PROGRESS NOTES
[address]  [facility] [place]  [address]  [facility] [address]  Hematology Ambulatory Consult  Diana Conner, 1955, [id number]  8/20/2024      Assessment and Plan   1. Symptomatic anemia; 2. Iron deficiency anemia secondary to inadequate dietary iron intake  This is a 69-year-old female with past medical history of symptomatic anemia starting in winter 2020 3/2024.  Patient was hospitalized with a hemoglobin of 5.8 g/dL.  Patient was given blood transfusions and discharged on oral iron supplements.  Most recently in early July patient was found to have a hemoglobin of 8.9 g/dL, not much improvement over the past several months since blood transfusion.  Patient has been diligent about oral iron supplementation.  Patient's iron levels have not changed at all therefore the patient has failed oral iron medication    IV iron was recommended.   Two types of IV iron were discussed: Venofer and Feraheme.  Patient understands dosing and schedule differences between the two medications.  If Feraheme is not covered by insurance, we will automatically supplement with Venofer 200 mg IV weekly x 7.  We discussed potential side effects which include but are not limited to IV site reactions, tattooing, hypotension, arthralgias, headache, nausea, and anaphylaxis.  If patient experiences any side effects they are to call the office to discuss premedications are necessary for subsequent dosing.    Patient will follow-up in approximately 3 months with blood work prior.    Patient was referred to GI for capsule endoscopy as it is possible that the patient might have AVMs.  Dietary sources of iron are required.    - CBC and differential; Future  - Iron Panel (Includes Ferritin, Iron Sat%, Iron, and TIBC); Future  - Vitamin B12; Future  - Folate; Future  - CBC and differential; Future  - Iron Panel (Includes Ferritin, Iron Sat%, Iron, and TIBC); Future  - Vitamin B12; Future  - Folate;  Future  - Ambulatory referral to Gastroenterology; Future      3. 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  Recommend checking other substrates.  See below.    - Vitamin B12; Future  - Folate; Future  - Vitamin B12; Future  - Folate; Future    The patient is scheduled for follow-up in approximately 3 months.     Patient voiced agreement and understanding to the above.   Patient knows to call the Hematology/Oncology office with any questions and concerns regarding the above.    Barrier(s) to care: None.   The patient is able to self care.    Kavya Alcocer PA-C  Medical Oncology/Hematology  [facility]    Subjective     Chief Complaint   Patient presents with    Consult     Referring provider    BRIE Hinojosa  [id number] [phone]    History of present illness:   This is a 69-year-old female with past medical history of GERD, gout, hypertension, stage III kidney disease, asthma and more recently iron deficiency anemia discovered in December 2023 who presents to the hematology clinic at the request of the PCP secondary to refractory iron deficiency anemia on oral iron x 8 months.    Historical CBCs:  4/20/2018 WBC 5.9, hemoglobin 14.7, platelet count 171  2/13/2021 WBC 8.1, hemoglobin 14.6, platelets 164  2/6/2023 WBC 4.9, hemoglobin 13.6, MCV 91, RDW 13.9, platelet count 180    In December 2023 patient presented to the emergency room via EMS secondary to weakness and bright red blood per rectum.  Hemoglobin of 5.8 found on presentation, patient was transfused with 2 units of packed red blood cells.   12/12/2023 ferritin = 2, iron saturation 1%   12/13/2023 colonoscopy demonstrated hemorrhoids and diverticulosis  endoscopy stomach and duodenum appeared normal-esophagus demonstrated goblet cells with minimal  intestinal metaplasia.  Discharge blood work demonstrated a hemoglobin of 8.2 g/dL with an MCV of 66 RDW of 22.5   -Discharged home on oral supplements    7/9/2024 WBC 6.9, hemoglobin 8.9, MCV 71, RDW 18.7, platelet count 246   ON 18, creatinine 1.02, EGFR 57   Iron saturation 3%, TIBC 88, ferritin 3      08/20/24: Patient notes that she is profoundly fatigued notes compliance on oral supplements.  Patient notes diet is limited with meat secondary to gout.  Patient eats a lot of Cheerios as a snack.    Review of Systems   Constitutional:  Positive for activity change and fatigue. Negative for appetite change, fever and unexpected weight change.   HENT:  Negative for nosebleeds.    Respiratory:  Negative for cough, choking and shortness of breath.         Negative hemoptysis.   Cardiovascular:  Negative for chest pain, palpitations and leg swelling.   Gastrointestinal: Negative.  Negative for abdominal distention, abdominal pain, anal bleeding, blood in stool, constipation, diarrhea, nausea and vomiting.   Endocrine: Negative.  Negative for cold intolerance.   Genitourinary: Negative.  Negative for hematuria, menstrual problem, vaginal bleeding, vaginal discharge and vaginal pain.   Musculoskeletal: Negative.  Negative for arthralgias, myalgias, neck pain and neck stiffness.   Skin: Negative.  Negative for color change, pallor and rash.   Allergic/Immunologic: Negative.  Negative for immunocompromised state.   Neurological: Negative.  Negative for weakness and headaches.   Hematological:  Negative for adenopathy. Does not bruise/bleed easily.   All other systems reviewed and are negative.      Past Medical History:   Diagnosis Date    Anemia 12/12/2023    Asthma     Gastroesophageal reflux disease without esophagitis 12/26/2023    Gout 02/13/2021    Hypertension 10/25/2023    Lateral epicondylitis 10/9/2015    Stage 3 chronic kidney disease, unspecified whether stage 3a or 3b CKD (HCC) 1/13/2022     Past Surgical  History:   Procedure Laterality Date    IMPLANTATION VAGAL NERVE STIMULATOR       Family History   Problem Relation Age of Onset    No Known Problems Mother     Kidney disease Father     No Known Problems Sister     No Known Problems Sister     No Known Problems Sister     Cancer Paternal Uncle     No Known Problems Maternal Grandmother     No Known Problems Maternal Grandfather     No Known Problems Paternal Grandmother     No Known Problems Paternal Grandfather      Social History     Socioeconomic History    Marital status: Single     Spouse name: None    Number of children: None    Years of education: None    Highest education level: None   Occupational History    None   Tobacco Use    Smoking status: Former     Current packs/day: 0.00     Types: Cigarettes     Quit date:      Years since quittin.6    Smokeless tobacco: Former    Tobacco comments:     exposure to passive smoke   Vaping Use    Vaping status: Never Used   Substance and Sexual Activity    Alcohol use: Never    Drug use: Never    Sexual activity: None   Other Topics Concern    None   Social History Narrative    None     Social Determinants of Health     Financial Resource Strain: Low Risk  (2023)    Overall Financial Resource Strain (CARDIA)     Difficulty of Paying Living Expenses: Not hard at all   Food Insecurity: No Food Insecurity (2024)    Hunger Vital Sign     Worried About Running Out of Food in the Last Year: Never true     Ran Out of Food in the Last Year: Never true   Transportation Needs: No Transportation Needs (2024)    PRAPARE - Transportation     Lack of Transportation (Medical): No     Lack of Transportation (Non-Medical): No   Physical Activity: Not on file   Stress: Not on file   Social Connections: Not on file   Intimate Partner Violence: Not on file   Housing Stability: Low Risk  (2024)    Housing Stability Vital Sign     Unable to Pay for Housing in the Last Year: No     Number of Times Moved in the  Last Year: 1     Homeless in the Last Year: No         Current Outpatient Medications:     albuterol (PROVENTIL HFA,VENTOLIN HFA) 90 mcg/act inhaler, INHALE 1 PUFF BY MOUTH EVERY 6 HOURS, Disp: 18 g, Rfl: 3    Ascorbic Acid (vitamin C) 100 MG tablet, Take 1 tablet (100 mg total) by mouth daily, Disp: 90 tablet, Rfl: 1    aspirin (ECOTRIN) 325 mg EC tablet, Take 1 tablet (325 mg total) by mouth daily, Disp: 30 tablet, Rfl: 2    benzonatate (TESSALON PERLES) 100 mg capsule, Take 1 capsule (100 mg total) by mouth 3 (three) times a day as needed for cough, Disp: 20 capsule, Rfl: 0    cholecalciferol (VITAMIN D3) 1,000 units tablet, TAKE 4 TABLETS BY MOUTH EVERY DAY, Disp: 360 tablet, Rfl: 1    clindamycin (CLEOCIN T) 1 % lotion, Apply topically 2 (two) times a day (Patient not taking: Reported on 3/25/2024), Disp: 60 mL, Rfl: 0    ferrous sulfate 324 (65 Fe) mg, TAKE 1 TABLET(324 MG) BY MOUTH DAILY BEFORE BREAKFAST, Disp: 60 tablet, Rfl: 3    Fluticasone-Salmeterol (Advair) 100-50 mcg/dose inhaler, Inhale 1 puff 2 (two) times a day Rinse mouth after use. (Patient not taking: Reported on 12/26/2023), Disp: 60 blister, Rfl: 5    lisinopril (ZESTRIL) 5 mg tablet, TAKE 1 TABLET(5 MG) BY MOUTH DAILY, Disp: 90 tablet, Rfl: 0    oxybutynin (DITROPAN-XL) 10 MG 24 hr tablet, Take 1 tablet (10 mg total) by mouth daily, Disp: 90 tablet, Rfl: 0    pantoprazole (PROTONIX) 20 mg tablet, TAKE 1 TABLET(20 MG) BY MOUTH DAILY BEFORE BREAKFAST, Disp: 90 tablet, Rfl: 1    pravastatin (PRAVACHOL) 10 mg tablet, TAKE 1 TABLET(10 MG) BY MOUTH DAILY AT BEDTIME, Disp: 90 tablet, Rfl: 0    zolpidem (AMBIEN) 5 mg tablet, Take 1 tablet (5 mg total) by mouth as needed for sleep (for use during sleep study) for up to 2 days, Disp: 2 tablet, Rfl: 0  Allergies   Allergen Reactions    Lansoprazole Palpitations     Erythema/ tachycardia  Other reaction(s): tachycardia    Penicillins Itching and Rash     Other reaction(s): Rash  Other reaction(s): Rash  "      Objective   /80 (BP Location: Right arm, Patient Position: Sitting, Cuff Size: Adult)   Pulse 85   Temp 98 °F (36.7 °C)   Resp 18   Ht 4' 7\" (1.397 m)   Wt 90.9 kg (200 lb 8 oz)   SpO2 96%   BMI 46.60 kg/m²   Physical Exam  Constitutional:       General: She is not in acute distress.     Appearance: She is well-developed.   HENT:      Head: Normocephalic and atraumatic.   Eyes:      General: No scleral icterus.     Conjunctiva/sclera: Conjunctivae normal.   Cardiovascular:      Rate and Rhythm: Normal rate.   Pulmonary:      Effort: Pulmonary effort is normal. No respiratory distress.   Skin:     General: Skin is warm.      Coloration: Skin is not pale.      Findings: No rash.   Neurological:      Mental Status: She is alert and oriented to person, place, and time.   Psychiatric:         Thought Content: Thought content normal.         Result Review  Labs:  No visits with results within 3 Week(s) from this visit.   Latest known visit with results is:   Appointment on 07/09/2024   Component Date Value Ref Range Status    Cholesterol 07/09/2024 161  See Comment mg/dL Final    Cholesterol:         Pediatric <18 Years        Desirable          <170 mg/dL      Borderline High    170-199 mg/dL      High               >=200 mg/dL        Adult >=18 Years            Desirable         <200 mg/dL      Borderline High   200-239 mg/dL      High              >239 mg/dL      Triglycerides 07/09/2024 171 (H)  See Comment mg/dL Final    Triglyceride:     0-9Y            <75mg/dL     10Y-17Y         <90 mg/dL       >=18Y     Normal          <150 mg/dL     Borderline High 150-199 mg/dL     High            200-499 mg/dL        Very High       >499 mg/dL    Specimen collection should occur prior to Metamizole administration due to the potential for falsely depressed results.    HDL, Direct 07/09/2024 63  >=50 mg/dL Final    LDL Calculated 07/09/2024 64  0 - 100 mg/dL Final    LDL Cholesterol:     Optimal           " <100 mg/dl     Near Optimal      100-129 mg/dl     Above Optimal       Borderline High 130-159 mg/dl       High            160-189 mg/dl       Very High       >189 mg/dl         This screening LDL is a calculated result.   It does not have the accuracy of the Direct Measured LDL in the monitoring of patients with hyperlipidemia and/or statin therapy.   Direct Measure LDL (XVT000) must be ordered separately in these patients.    Non-HDL-Chol (CHOL-HDL) 07/09/2024 98  mg/dl Final    WBC 07/09/2024 6.90  4.31 - 10.16 Thousand/uL Final    RBC 07/09/2024 4.72  3.81 - 5.12 Million/uL Final    Hemoglobin 07/09/2024 8.9 (L)  11.5 - 15.4 g/dL Final    Hematocrit 07/09/2024 33.5 (L)  34.8 - 46.1 % Final    MCV 07/09/2024 71 (L)  82 - 98 fL Final    MCH 07/09/2024 18.9 (L)  26.8 - 34.3 pg Final    MCHC 07/09/2024 26.6 (L)  31.4 - 37.4 g/dL Final    RDW 07/09/2024 18.7 (H)  11.6 - 15.1 % Final    MPV 07/09/2024 10.4  8.9 - 12.7 fL Final    Platelets 07/09/2024 246  149 - 390 Thousands/uL Final    nRBC 07/09/2024 0  /100 WBCs Final    Segmented % 07/09/2024 73  43 - 75 % Final    Immature Grans % 07/09/2024 0  0 - 2 % Final    Lymphocytes % 07/09/2024 17  14 - 44 % Final    Monocytes % 07/09/2024 6  4 - 12 % Final    Eosinophils Relative 07/09/2024 3  0 - 6 % Final    Basophils Relative 07/09/2024 1  0 - 1 % Final    Absolute Neutrophils 07/09/2024 5.10  1.85 - 7.62 Thousands/µL Final    Absolute Immature Grans 07/09/2024 0.03  0.00 - 0.20 Thousand/uL Final    Absolute Lymphocytes 07/09/2024 1.17  0.60 - 4.47 Thousands/µL Final    Absolute Monocytes 07/09/2024 0.38  0.17 - 1.22 Thousand/µL Final    Eosinophils Absolute 07/09/2024 0.18  0.00 - 0.61 Thousand/µL Final    Basophils Absolute 07/09/2024 0.04  0.00 - 0.10 Thousands/µL Final    Sodium 07/09/2024 139  135 - 147 mmol/L Final    Potassium 07/09/2024 4.4  3.5 - 5.3 mmol/L Final    Chloride 07/09/2024 104  96 - 108 mmol/L Final    CO2 07/09/2024 24  21 - 32 mmol/L Final     ANION GAP 07/09/2024 11  4 - 13 mmol/L Final    BUN 07/09/2024 18  5 - 25 mg/dL Final    Creatinine 07/09/2024 1.02  0.60 - 1.30 mg/dL Final    Standardized to IDMS reference method    Glucose, Fasting 07/09/2024 106 (H)  65 - 99 mg/dL Final    Calcium 07/09/2024 9.6  8.4 - 10.2 mg/dL Final    AST 07/09/2024 19  13 - 39 U/L Final    ALT 07/09/2024 15  7 - 52 U/L Final    Specimen collection should occur prior to Sulfasalazine administration due to the potential for falsely depressed results.     Alkaline Phosphatase 07/09/2024 59  34 - 104 U/L Final    Total Protein 07/09/2024 7.7  6.4 - 8.4 g/dL Final    Albumin 07/09/2024 4.2  3.5 - 5.0 g/dL Final    Total Bilirubin 07/09/2024 0.57  0.20 - 1.00 mg/dL Final    Use of this assay is not recommended for patients undergoing treatment with eltrombopag due to the potential for falsely elevated results.  N-acetyl-p-benzoquinone imine (metabolite of Acetaminophen) will generate erroneously low results in samples for patients that have taken an overdose of Acetaminophen.    eGFR 07/09/2024 57  ml/min/1.73sq m Final    Hemoglobin A1C 07/09/2024 5.7 (H)  Normal 4.0-5.6%; PreDiabetic 5.7-6.4%; Diabetic >=6.5%; Glycemic control for adults with diabetes <7.0% % Final    EAG 07/09/2024 117  mg/dl Final    Iron Saturation 07/09/2024 3 (L)  15 - 50 % Final    TIBC 07/09/2024 888 (H)  250 - 450 ug/dL Final    Iron 07/09/2024 31 (L)  50 - 212 ug/dL Final    Patients treated with metal-binding drugs (ie. Deferoxamine) may have depressed iron values.    UIBC 07/09/2024 857 (H)  155 - 355 ug/dL Final    Ferritin 07/09/2024 3 (L)  11 - 307 ng/mL Final       Please note:  This report has been generated by a voice recognition software system. Therefore there may be syntax, spelling, and/or grammatical errors. Please call if you have any questions.

## 2024-08-23 ENCOUNTER — APPOINTMENT (OUTPATIENT)
Dept: LAB | Facility: HOSPITAL | Age: 69
End: 2024-08-23
Payer: COMMERCIAL

## 2024-08-23 DIAGNOSIS — D50.8 OTHER IRON DEFICIENCY ANEMIA: ICD-10-CM

## 2024-08-23 DIAGNOSIS — D64.9 SYMPTOMATIC ANEMIA: ICD-10-CM

## 2024-08-23 DIAGNOSIS — E53.8 DEFICIENCY OF OTHER SPECIFIED B GROUP VITAMINS: ICD-10-CM

## 2024-08-23 LAB
BASOPHILS # BLD AUTO: 0.05 THOUSANDS/ÂΜL (ref 0–0.1)
BASOPHILS NFR BLD AUTO: 1 % (ref 0–1)
EOSINOPHIL # BLD AUTO: 0.2 THOUSAND/ÂΜL (ref 0–0.61)
EOSINOPHIL NFR BLD AUTO: 3 % (ref 0–6)
ERYTHROCYTE [DISTWIDTH] IN BLOOD BY AUTOMATED COUNT: 19.9 % (ref 11.6–15.1)
FERRITIN SERPL-MCNC: 4 NG/ML (ref 24–307)
FOLATE SERPL-MCNC: >22.3 NG/ML
HCT VFR BLD AUTO: 31.6 % (ref 36.5–46.1)
HGB BLD-MCNC: 8.3 G/DL (ref 12–15.4)
IMM GRANULOCYTES # BLD AUTO: 0.02 THOUSAND/UL (ref 0–0.2)
IMM GRANULOCYTES NFR BLD AUTO: 0 % (ref 0–2)
IRON SATN MFR SERPL: 2 % (ref 15–50)
IRON SERPL-MCNC: 19 UG/DL (ref 50–212)
LYMPHOCYTES # BLD AUTO: 1.48 THOUSANDS/ÂΜL (ref 0.6–4.47)
LYMPHOCYTES NFR BLD AUTO: 24 % (ref 14–44)
MCH RBC QN AUTO: 18.9 PG (ref 26.8–34.3)
MCHC RBC AUTO-ENTMCNC: 26.3 G/DL (ref 31.4–37.4)
MCV RBC AUTO: 72 FL (ref 82–98)
MONOCYTES # BLD AUTO: 0.4 THOUSAND/ÂΜL (ref 0.17–1.22)
MONOCYTES NFR BLD AUTO: 7 % (ref 4–12)
NEUTROPHILS # BLD AUTO: 4 THOUSANDS/ÂΜL (ref 1.85–7.62)
NEUTS SEG NFR BLD AUTO: 65 % (ref 43–75)
NRBC BLD AUTO-RTO: 0 /100 WBCS
PLATELET # BLD AUTO: 246 THOUSANDS/UL (ref 149–390)
PMV BLD AUTO: 11.1 FL (ref 8.9–12.7)
RBC # BLD AUTO: 4.4 MILLION/UL (ref 3.88–5.12)
TIBC SERPL-MCNC: 1067 UG/DL (ref 250–450)
UIBC SERPL-MCNC: 1048 UG/DL (ref 155–355)
VIT B12 SERPL-MCNC: 417 PG/ML (ref 180–914)
WBC # BLD AUTO: 6.15 THOUSAND/UL (ref 4.31–10.16)

## 2024-08-23 PROCEDURE — 82728 ASSAY OF FERRITIN: CPT

## 2024-08-23 PROCEDURE — 83540 ASSAY OF IRON: CPT

## 2024-08-23 PROCEDURE — 83550 IRON BINDING TEST: CPT

## 2024-08-23 PROCEDURE — 82746 ASSAY OF FOLIC ACID SERUM: CPT

## 2024-08-23 PROCEDURE — 82607 VITAMIN B-12: CPT

## 2024-08-23 PROCEDURE — 85025 COMPLETE CBC W/AUTO DIFF WBC: CPT

## 2024-08-23 PROCEDURE — 36415 COLL VENOUS BLD VENIPUNCTURE: CPT

## 2024-09-03 DIAGNOSIS — D50.8 OTHER IRON DEFICIENCY ANEMIA: ICD-10-CM

## 2024-09-03 DIAGNOSIS — D64.9 SYMPTOMATIC ANEMIA: Primary | ICD-10-CM

## 2024-09-03 RX ORDER — SODIUM CHLORIDE 9 MG/ML
20 INJECTION, SOLUTION INTRAVENOUS ONCE
Status: CANCELLED | OUTPATIENT
Start: 2024-09-05

## 2024-09-05 ENCOUNTER — HOSPITAL ENCOUNTER (OUTPATIENT)
Dept: INFUSION CENTER | Facility: HOSPITAL | Age: 69
End: 2024-09-05
Attending: INTERNAL MEDICINE
Payer: COMMERCIAL

## 2024-09-05 VITALS
RESPIRATION RATE: 18 BRPM | TEMPERATURE: 97.2 F | DIASTOLIC BLOOD PRESSURE: 56 MMHG | HEART RATE: 67 BPM | SYSTOLIC BLOOD PRESSURE: 108 MMHG

## 2024-09-05 DIAGNOSIS — D50.8 OTHER IRON DEFICIENCY ANEMIA: Primary | ICD-10-CM

## 2024-09-05 DIAGNOSIS — D64.9 SYMPTOMATIC ANEMIA: ICD-10-CM

## 2024-09-05 PROCEDURE — 96365 THER/PROPH/DIAG IV INF INIT: CPT

## 2024-09-05 RX ORDER — SODIUM CHLORIDE 9 MG/ML
20 INJECTION, SOLUTION INTRAVENOUS ONCE
Status: CANCELLED | OUTPATIENT
Start: 2024-09-12

## 2024-09-05 RX ORDER — SODIUM CHLORIDE 9 MG/ML
20 INJECTION, SOLUTION INTRAVENOUS ONCE
Status: COMPLETED | OUTPATIENT
Start: 2024-09-05 | End: 2024-09-05

## 2024-09-05 RX ADMIN — SODIUM CHLORIDE 20 ML/HR: 9 INJECTION, SOLUTION INTRAVENOUS at 13:19

## 2024-09-05 RX ADMIN — FERUMOXYTOL 510 MG: 510 INJECTION INTRAVENOUS at 13:24

## 2024-09-05 NOTE — PROGRESS NOTES
Patient tolerated IV feraheme without complications. Next appointment scheduled sept 12th at [address] [place] [address] infusion center. AVS declined.

## 2024-09-12 ENCOUNTER — HOSPITAL ENCOUNTER (OUTPATIENT)
Dept: INFUSION CENTER | Facility: HOSPITAL | Age: 69
End: 2024-09-12
Attending: INTERNAL MEDICINE
Payer: COMMERCIAL

## 2024-09-12 VITALS
HEART RATE: 64 BPM | TEMPERATURE: 97.7 F | RESPIRATION RATE: 20 BRPM | DIASTOLIC BLOOD PRESSURE: 59 MMHG | SYSTOLIC BLOOD PRESSURE: 108 MMHG

## 2024-09-12 DIAGNOSIS — D50.8 OTHER IRON DEFICIENCY ANEMIA: ICD-10-CM

## 2024-09-12 DIAGNOSIS — D64.9 SYMPTOMATIC ANEMIA: Primary | ICD-10-CM

## 2024-09-12 PROCEDURE — 96365 THER/PROPH/DIAG IV INF INIT: CPT

## 2024-09-12 RX ORDER — SODIUM CHLORIDE 9 MG/ML
20 INJECTION, SOLUTION INTRAVENOUS ONCE
Status: CANCELLED | OUTPATIENT
Start: 2024-09-21

## 2024-09-12 RX ORDER — SODIUM CHLORIDE 9 MG/ML
20 INJECTION, SOLUTION INTRAVENOUS ONCE
Status: COMPLETED | OUTPATIENT
Start: 2024-09-12 | End: 2024-09-12

## 2024-09-12 RX ADMIN — SODIUM CHLORIDE 20 ML/HR: 9 INJECTION, SOLUTION INTRAVENOUS at 12:11

## 2024-09-12 RX ADMIN — FERUMOXYTOL 510 MG: 510 INJECTION INTRAVENOUS at 12:11

## 2024-09-13 ENCOUNTER — TELEPHONE (OUTPATIENT)
Age: 69
End: 2024-09-13

## 2024-09-13 NOTE — TELEPHONE ENCOUNTER
Please call patient to advise if she should come in to get her vaccinations now or is she okay to wait until her next OV on 12/17.    Last flu/pneumonia: 1/16/23

## 2024-09-13 NOTE — TELEPHONE ENCOUNTER
Was unable to get in contact with pt to tell her she can make an appointment now to get her flu vaccine.

## 2024-09-18 ENCOUNTER — OFFICE VISIT (OUTPATIENT)
Dept: GASTROENTEROLOGY | Facility: CLINIC | Age: 69
End: 2024-09-18
Payer: COMMERCIAL

## 2024-09-18 VITALS
HEIGHT: 55 IN | WEIGHT: 194.8 LBS | DIASTOLIC BLOOD PRESSURE: 74 MMHG | SYSTOLIC BLOOD PRESSURE: 118 MMHG | TEMPERATURE: 97.1 F | BODY MASS INDEX: 45.08 KG/M2

## 2024-09-18 DIAGNOSIS — D64.9 SYMPTOMATIC ANEMIA: Primary | ICD-10-CM

## 2024-09-18 DIAGNOSIS — D50.0 IRON DEFICIENCY ANEMIA DUE TO CHRONIC BLOOD LOSS: ICD-10-CM

## 2024-09-18 DIAGNOSIS — K86.9 LESION OF PANCREAS: ICD-10-CM

## 2024-09-18 DIAGNOSIS — R19.7 DIARRHEA, UNSPECIFIED TYPE: ICD-10-CM

## 2024-09-18 PROCEDURE — 99204 OFFICE O/P NEW MOD 45 MIN: CPT | Performed by: PHYSICIAN ASSISTANT

## 2024-09-18 RX ORDER — MULTIVIT WITH MINERALS/LUTEIN
1000 TABLET ORAL DAILY
COMMUNITY
Start: 2024-07-06

## 2024-09-18 NOTE — PROGRESS NOTES
[facility] - Outpatient Consultation  Diana Conner 69 y.o. adult MRN: [id number]  Encounter: [id number]          ASSESSMENT AND PLAN:      1. Symptomatic anemia  2. Iron deficiency anemia due to chronic blood loss  She is referred by hematology due to iron deficiency anemia refractory to oral iron. Most recent labs from Aug 2024 show hemoglobin 8.3, MCV 72, iron sat 2%, TIBC 1067, and ferritin 4. She is receiving IV iron infusions. She underwent EGD and colonoscopy with TI intubation in December 2023 which were negative for bleeding. She has no overt bleeding. Recommend capsule endoscopy as the next step to evaluate for small bowel AVMs.    - Ambulatory referral to Gastroenterology  - Tissue transglutaminase, IgA; Future  - IgA; Future  - Capsule endoscopy; Future    3. Lesion of pancreas  CT A/P with IV contrast from July 2024 shows 1.3 x 1.1 x 1.5 cm lobulated low-attenuation lesion in the proximal pancreatic body without ductal dilation. No pancreatic atrophy or mass noted. This is most suspicious for sidebranch IPMN. Plan for MRI/MRCP to further characterize the cyst. I did note she has a vagal nerve stimulator, so if she is unable to have MRI, we could obtain repeat CT of the pancreas.    - MRI abdomen w wo contrast and mrcp; Future  - BUN; Future  - Creatinine, serum; Future    4. Diarrhea, unspecified type  She reports chronic intermittent diarrhea which may be dietary related/due to food intolerance.  However, she does have iron deficiency anemia, so prudent to assess for celiac disease. Biopsies were not taken on previous endoscopy, so I will check TTG IgA.  We will also schedule capsule endoscopy to evaluate for small bowel inflammation and bleeding. I recommended use of Imodium as needed and also gave information on low FODMAP diet. Discussed avoiding artificial sweeteners.    Follow-up in a few  months  ______________________________________________________________________    HPI: 69-year-old female with hypertension, hyperlipidemia, prediabetes, obesity, stage IIIa CKD, former smoker, GERD, pancreas cyst, stress incontinence referred for iron deficiency anemia.    Patient tells me she was admitted to the hospital in December 2023 with GI bleeding and she was found to be anemic at that time. She underwent EGD and colonoscopy. EGD was normal with findings of irregular Z-line. Colonoscopy showed hemorrhoids and sigmoid diverticulosis otherwise normal entire colon and distal 10 cm of ileum.  Biopsies were not taken for celiac disease.    Patient states she is following with hematology and unfortunately failed oral iron therapy.  She is currently getting IV iron infusions.  She denies overt GI bleeding.  She states her stools are brown.  She has 2-3 bowel movements daily which are sometimes loose and watery in consistency.  She denies abnormal weight loss, abdominal pain, upper GI symptoms.    She takes aspirin 325 mg daily. She does not take ibuprofen.    REVIEW OF SYSTEMS:    CONSTITUTIONAL: Denies any fever, chills, rigors, and weight loss.  HEENT: No earache or tinnitus. Denies hearing loss or visual disturbances.  CARDIOVASCULAR: No chest pain or palpitations.   RESPIRATORY: Denies any cough, hemoptysis, shortness of breath or dyspnea on exertion.  GASTROINTESTINAL: As noted in the History of Present Illness.   GENITOURINARY: + problems with urination. Denies any hematuria or dysuria.  NEUROLOGIC: No dizziness or vertigo, denies headaches.   MUSCULOSKELETAL: Denies any muscle or joint pain.   SKIN: Denies skin rashes or itching.   ENDOCRINE: Denies excessive thirst. Denies intolerance to heat or cold.  PSYCHOSOCIAL: Denies depression or anxiety. Denies any recent memory loss.       Historical Information   Past Medical History:   Diagnosis Date    Anemia 12/12/2023    Asthma     Gastroesophageal reflux  disease without esophagitis 2023    Gout 2021    Hypertension 10/25/2023    Lateral epicondylitis 10/9/2015    Stage 3 chronic kidney disease, unspecified whether stage 3a or 3b CKD (HCC) 2022     Past Surgical History:   Procedure Laterality Date    IMPLANTATION VAGAL NERVE STIMULATOR       Social History   Social History     Substance and Sexual Activity   Alcohol Use Never     Social History     Substance and Sexual Activity   Drug Use Never     Social History     Tobacco Use   Smoking Status Former    Current packs/day: 0.00    Types: Cigarettes    Quit date:     Years since quittin.7   Smokeless Tobacco Former   Tobacco Comments    exposure to passive smoke     Family History   Problem Relation Age of Onset    No Known Problems Mother     Kidney disease Father     No Known Problems Sister     No Known Problems Sister     No Known Problems Sister     Cancer Paternal Uncle     No Known Problems Maternal Grandmother     No Known Problems Maternal Grandfather     No Known Problems Paternal Grandmother     No Known Problems Paternal Grandfather        Meds/Allergies       Current Outpatient Medications:     albuterol (PROVENTIL HFA,VENTOLIN HFA) 90 mcg/act inhaler    Ascorbic Acid (vitamin C) 100 MG tablet    Ascorbic Acid (vitamin C) 1000 MG tablet    aspirin (ECOTRIN) 325 mg EC tablet    benzonatate (TESSALON PERLES) 100 mg capsule    cholecalciferol (VITAMIN D3) 1,000 units tablet    ferrous sulfate 324 (65 Fe) mg    lisinopril (ZESTRIL) 5 mg tablet    oxybutynin (DITROPAN-XL) 10 MG 24 hr tablet    pantoprazole (PROTONIX) 20 mg tablet    pravastatin (PRAVACHOL) 10 mg tablet    clindamycin (CLEOCIN T) 1 % lotion    Fluticasone-Salmeterol (Advair) 100-50 mcg/dose inhaler    zolpidem (AMBIEN) 5 mg tablet    Allergies   Allergen Reactions    Lansoprazole Palpitations     Erythema/ tachycardia  Other reaction(s): tachycardia    Penicillins Itching and Rash     Other reaction(s): Rash  Other  "reaction(s): Rash           Objective     Blood pressure 118/74, temperature (!) 97.1 °F (36.2 °C), height 4' 7\" (1.397 m), weight 88.4 kg (194 lb 12.8 oz). Body mass index is 45.28 kg/m².        PHYSICAL EXAM:      General Appearance:   Alert, cooperative, no distress   HEENT:   Normocephalic, atraumatic, anicteric.     Neck:  Supple, symmetrical, trachea midline   Lungs:   Clear to auscultation bilaterally   Heart::   Regular rate and rhythm   Abdomen:   Soft, non-tender, non-distended; normal bowel sounds   Genitalia:   Deferred    Rectal:   Deferred    Extremities:  No cyanosis, clubbing or edema    Pulses:  2+ and symmetric    Skin:  No jaundice, rashes, or lesions    Lymph nodes:  No palpable cervical lymphadenopathy        Lab Results:   No visits with results within 1 Day(s) from this visit.   Latest known visit with results is:   Appointment on 08/23/2024   Component Date Value    WBC 08/23/2024 6.15     RBC 08/23/2024 4.40     Hemoglobin 08/23/2024 8.3 (L)     Hematocrit 08/23/2024 31.6 (L)     MCV 08/23/2024 72 (L)     MCH 08/23/2024 18.9 (L)     MCHC 08/23/2024 26.3 (L)     RDW 08/23/2024 19.9 (H)     MPV 08/23/2024 11.1     Platelets 08/23/2024 246     nRBC 08/23/2024 0     Segmented % 08/23/2024 65     Immature Grans % 08/23/2024 0     Lymphocytes % 08/23/2024 24     Monocytes % 08/23/2024 7     Eosinophils Relative 08/23/2024 3     Basophils Relative 08/23/2024 1     Absolute Neutrophils 08/23/2024 4.00     Absolute Immature Grans 08/23/2024 0.02     Absolute Lymphocytes 08/23/2024 1.48     Absolute Monocytes 08/23/2024 0.40     Eosinophils Absolute 08/23/2024 0.20     Basophils Absolute 08/23/2024 0.05     Vitamin B-12 08/23/2024 417     Folate 08/23/2024 >22.3     Iron Saturation 08/23/2024 2 (L)     TIBC 08/23/2024 1,067 (H)     Iron 08/23/2024 19 (L)     UIBC 08/23/2024 1,048 (H)     Ferritin 08/23/2024 4 (L)          Radiology Results:   No results found.   Answers submitted by the patient for " this visit:  Questionnaire about: Abdominal pain (Submitted on 9/18/2024)  Chief Complaint: Abdominal pain

## 2024-09-19 DIAGNOSIS — N39.46 MIXED STRESS AND URGE URINARY INCONTINENCE: ICD-10-CM

## 2024-09-19 DIAGNOSIS — E78.2 MIXED HYPERLIPIDEMIA: ICD-10-CM

## 2024-09-19 RX ORDER — OXYBUTYNIN CHLORIDE 10 MG/1
10 TABLET, EXTENDED RELEASE ORAL DAILY
Qty: 90 TABLET | Refills: 1 | Status: SHIPPED | OUTPATIENT
Start: 2024-09-19

## 2024-09-19 RX ORDER — PRAVASTATIN SODIUM 10 MG
TABLET ORAL
Qty: 90 TABLET | Refills: 0 | Status: SHIPPED | OUTPATIENT
Start: 2024-09-19

## 2024-09-19 NOTE — PATIENT INSTRUCTIONS
MRI/ERCP not scheduled at time of check out.  Pt has a stimulator devise.  Reported to Central Scheduling, MRI tech will call to schedule following review of devise protocol.

## 2024-09-20 ENCOUNTER — TELEPHONE (OUTPATIENT)
Dept: GASTROENTEROLOGY | Facility: CLINIC | Age: 69
End: 2024-09-20

## 2024-09-20 DIAGNOSIS — K86.9 LESION OF PANCREAS: Primary | ICD-10-CM

## 2024-09-20 NOTE — TELEPHONE ENCOUNTER
I received this message from radiology.  Please call the patient and let her know she cannot have abdominal MRI due to her stimulator. Instead, we would recommend having another CT scan in January 2025. I entered the order for CT, and she can call central scheduling to schedule this. Please let me know if she has questions.     ----- Message from Henri MAXWELL sent at 9/19/2024 11:40 AM EDT -----  Regarding: MRI  Good morning,   You recently ordered an MRI for the above pt.  Unfortunately her SNS is only eligible for head and extremity MRI scans, per the .  She cannot have an abdomen MRI.    I have canceled her order.  Feel free to contact any of our body rads to assist in alternative imaging suggestions at [phone] option 3.    Thank you,     Henri JEAN (CATHERINE ) () [facility] [place]   [facility]   [phone]

## 2024-09-23 NOTE — TELEPHONE ENCOUNTER
Patient notified she cannot have the MRI due to her stimulator. She will call back in October to schedule another CT scan per Radiology.

## 2024-09-29 ENCOUNTER — APPOINTMENT (EMERGENCY)
Dept: RADIOLOGY | Facility: HOSPITAL | Age: 69
End: 2024-09-29
Payer: COMMERCIAL

## 2024-09-29 ENCOUNTER — HOSPITAL ENCOUNTER (EMERGENCY)
Facility: HOSPITAL | Age: 69
Discharge: HOME | End: 2024-09-29
Attending: EMERGENCY MEDICINE | Admitting: EMERGENCY MEDICINE
Payer: COMMERCIAL

## 2024-09-29 VITALS
DIASTOLIC BLOOD PRESSURE: 100 MMHG | OXYGEN SATURATION: 96 % | HEART RATE: 101 BPM | SYSTOLIC BLOOD PRESSURE: 150 MMHG | RESPIRATION RATE: 14 BRPM | WEIGHT: 195.77 LBS | TEMPERATURE: 98 F | BODY MASS INDEX: 45.5 KG/M2

## 2024-09-29 DIAGNOSIS — R07.9 CHEST PAIN: Primary | ICD-10-CM

## 2024-09-29 DIAGNOSIS — R06.02 SHORTNESS OF BREATH: ICD-10-CM

## 2024-09-29 LAB
ALBUMIN SERPL BCG-MCNC: 4.3 G/DL (ref 3.5–5)
ALP SERPL-CCNC: 57 U/L (ref 34–104)
ALT SERPL W P-5'-P-CCNC: 23 U/L (ref 7–52)
ANION GAP SERPL CALCULATED.3IONS-SCNC: 12 MMOL/L (ref 4–13)
AST SERPL W P-5'-P-CCNC: 24 U/L (ref 13–39)
ATRIAL RATE: 96 BPM
BASOPHILS # BLD AUTO: 0.06 THOUSANDS/ÂΜL (ref 0–0.1)
BASOPHILS NFR BLD AUTO: 1 % (ref 0–1)
BILIRUB SERPL-MCNC: 0.63 MG/DL (ref 0.2–1)
BNP SERPL-MCNC: 45 PG/ML (ref 0–100)
BUN SERPL-MCNC: 15 MG/DL (ref 5–25)
CALCIUM SERPL-MCNC: 10.1 MG/DL (ref 8.4–10.2)
CARDIAC TROPONIN I PNL SERPL HS: 4 NG/L
CHLORIDE SERPL-SCNC: 107 MMOL/L (ref 96–108)
CO2 SERPL-SCNC: 22 MMOL/L (ref 21–32)
CREAT SERPL-MCNC: 0.88 MG/DL (ref 0.6–1.3)
D DIMER PPP FEU-MCNC: 0.45 UG/ML FEU
EOSINOPHIL # BLD AUTO: 0.13 THOUSAND/ÂΜL (ref 0–0.61)
EOSINOPHIL NFR BLD AUTO: 2 % (ref 0–6)
FLUAV AG UPPER RESP QL IA.RAPID: NEGATIVE
FLUBV AG UPPER RESP QL IA.RAPID: NEGATIVE
GFR SERPL CREATININE-BSD FRML MDRD: 67 ML/MIN/1.73SQ M
GLUCOSE SERPL-MCNC: 100 MG/DL (ref 65–140)
HCT VFR BLD AUTO: 41.1 % (ref 34.8–46.1)
HGB BLD-MCNC: 13.1 G/DL (ref 11.5–15.4)
IMM GRANULOCYTES # BLD AUTO: 0.01 THOUSAND/UL (ref 0–0.2)
IMM GRANULOCYTES NFR BLD AUTO: 0 % (ref 0–2)
LYMPHOCYTES # BLD AUTO: 1.55 THOUSANDS/ÂΜL (ref 0.6–4.47)
LYMPHOCYTES NFR BLD AUTO: 28 % (ref 14–44)
MCH RBC QN AUTO: 25.1 PG (ref 26.8–34.3)
MCHC RBC AUTO-ENTMCNC: 31.9 G/DL (ref 31.4–37.4)
MCV RBC AUTO: 79 FL (ref 82–98)
MONOCYTES # BLD AUTO: 0.35 THOUSAND/ÂΜL (ref 0.17–1.22)
MONOCYTES NFR BLD AUTO: 6 % (ref 4–12)
NEUTROPHILS # BLD AUTO: 3.54 THOUSANDS/ÂΜL (ref 1.85–7.62)
NEUTS SEG NFR BLD AUTO: 63 % (ref 43–75)
NRBC BLD AUTO-RTO: 0 /100 WBCS
P AXIS: 49 DEGREES
PLATELET # BLD AUTO: 231 THOUSANDS/UL (ref 149–390)
PMV BLD AUTO: 10.6 FL (ref 8.9–12.7)
POTASSIUM SERPL-SCNC: 3.6 MMOL/L (ref 3.5–5.3)
PR INTERVAL: 166 MS
PROT SERPL-MCNC: 8 G/DL (ref 6.4–8.4)
QRS AXIS: 1 DEGREES
QRSD INTERVAL: 80 MS
QT INTERVAL: 350 MS
QTC INTERVAL: 442 MS
RBC # BLD AUTO: 5.22 MILLION/UL (ref 3.81–5.12)
SARS-COV+SARS-COV-2 AG RESP QL IA.RAPID: NEGATIVE
SODIUM SERPL-SCNC: 141 MMOL/L (ref 135–147)
T WAVE AXIS: 66 DEGREES
VENTRICULAR RATE: 96 BPM
WBC # BLD AUTO: 5.64 THOUSAND/UL (ref 4.31–10.16)

## 2024-09-29 PROCEDURE — 84484 ASSAY OF TROPONIN QUANT: CPT

## 2024-09-29 PROCEDURE — 85025 COMPLETE CBC W/AUTO DIFF WBC: CPT

## 2024-09-29 PROCEDURE — 94640 AIRWAY INHALATION TREATMENT: CPT

## 2024-09-29 PROCEDURE — 80053 COMPREHEN METABOLIC PANEL: CPT

## 2024-09-29 PROCEDURE — 83880 ASSAY OF NATRIURETIC PEPTIDE: CPT

## 2024-09-29 PROCEDURE — 99285 EMERGENCY DEPT VISIT HI MDM: CPT

## 2024-09-29 PROCEDURE — 93005 ELECTROCARDIOGRAM TRACING: CPT

## 2024-09-29 PROCEDURE — 93010 ELECTROCARDIOGRAM REPORT: CPT

## 2024-09-29 PROCEDURE — 96374 THER/PROPH/DIAG INJ IV PUSH: CPT

## 2024-09-29 PROCEDURE — 36415 COLL VENOUS BLD VENIPUNCTURE: CPT

## 2024-09-29 PROCEDURE — 71045 X-RAY EXAM CHEST 1 VIEW: CPT

## 2024-09-29 PROCEDURE — 87811 SARS-COV-2 COVID19 W/OPTIC: CPT

## 2024-09-29 PROCEDURE — 85379 FIBRIN DEGRADATION QUANT: CPT

## 2024-09-29 PROCEDURE — 87804 INFLUENZA ASSAY W/OPTIC: CPT

## 2024-09-29 RX ORDER — ACETAMINOPHEN 325 MG/1
975 TABLET ORAL ONCE
Status: COMPLETED | OUTPATIENT
Start: 2024-09-29 | End: 2024-09-29

## 2024-09-29 RX ORDER — FLUTICASONE FUROATE AND VILANTEROL 100; 25 UG/1; UG/1
1 POWDER RESPIRATORY (INHALATION) ONCE
Status: COMPLETED | OUTPATIENT
Start: 2024-09-29 | End: 2024-09-29

## 2024-09-29 RX ORDER — FLUTICASONE PROPIONATE AND SALMETEROL 100; 50 UG/1; UG/1
1 POWDER RESPIRATORY (INHALATION) ONCE
Status: DISCONTINUED | OUTPATIENT
Start: 2024-09-29 | End: 2024-09-29

## 2024-09-29 RX ORDER — SUCRALFATE 1 G/1
1 TABLET ORAL ONCE
Status: COMPLETED | OUTPATIENT
Start: 2024-09-29 | End: 2024-09-29

## 2024-09-29 RX ORDER — IPRATROPIUM BROMIDE AND ALBUTEROL SULFATE 2.5; .5 MG/3ML; MG/3ML
3 SOLUTION RESPIRATORY (INHALATION) ONCE
Status: COMPLETED | OUTPATIENT
Start: 2024-09-29 | End: 2024-09-29

## 2024-09-29 RX ORDER — MAGNESIUM HYDROXIDE/ALUMINUM HYDROXICE/SIMETHICONE 120; 1200; 1200 MG/30ML; MG/30ML; MG/30ML
30 SUSPENSION ORAL ONCE
Status: COMPLETED | OUTPATIENT
Start: 2024-09-29 | End: 2024-09-29

## 2024-09-29 RX ORDER — PANTOPRAZOLE SODIUM 40 MG/10ML
40 INJECTION, POWDER, LYOPHILIZED, FOR SOLUTION INTRAVENOUS ONCE
Status: COMPLETED | OUTPATIENT
Start: 2024-09-29 | End: 2024-09-29

## 2024-09-29 RX ADMIN — ALUMINUM HYDROXIDE, MAGNESIUM HYDROXIDE, DIMETHICONE 30 ML: 200; 200; 20 LIQUID ORAL at 16:08

## 2024-09-29 RX ADMIN — IPRATROPIUM BROMIDE AND ALBUTEROL SULFATE 3 ML: 2.5; .5 SOLUTION RESPIRATORY (INHALATION) at 15:46

## 2024-09-29 RX ADMIN — FLUTICASONE FUROATE AND VILANTEROL TRIFENATATE 1 PUFF: 100; 25 POWDER RESPIRATORY (INHALATION) at 16:07

## 2024-09-29 RX ADMIN — PANTOPRAZOLE SODIUM 40 MG: 40 INJECTION, POWDER, FOR SOLUTION INTRAVENOUS at 16:08

## 2024-09-29 RX ADMIN — SUCRALFATE 1 G: 1 TABLET ORAL at 16:09

## 2024-09-29 RX ADMIN — ACETAMINOPHEN 975 MG: 325 TABLET ORAL at 16:09

## 2024-09-29 NOTE — DISCHARGE INSTRUCTIONS
Begin taking your inhaler twice a day and follow up with your PCP  Rinse out your mouth after using the inhaler    Return for worsening shortness of breath, chest pain, chest pain down the left arm, any worsening or concerning symptoms.

## 2024-09-29 NOTE — ED PROVIDER NOTES
"Final diagnoses:   Chest pain   Shortness of breath     ED Disposition       ED Disposition   Discharge    Condition   Stable    Date/Time   Sun Sep 29, 2024  4:29 PM    Comment   Diana Conner discharge to home/self care.                   Assessment & Plan       Medical Decision Making  Differential diagnosis to include but not limited to : viral uri, pneumonia, pulmonary embolism, ACS, CHF, GERD  ECG normal sinus rhythm. CBC, CMP, BNP, Flu/Covid unremarkable. Troponin level 4, cancelled repeat as the chest pain developed several days ago. D-Dimer unremarkable. CXR unremarkable. Will try duo-neb treatment for patient, along with GERD medications to determine if this provides relief.   Some improvement in symptoms. Discussed supportive care at home and PCP follow up. Strict discussion about ER return discussed.   Pt stable at time of discharge, vital signs reviewed, questions answered. Strict ER return precautions provided/discussed and were well understood by patient. Patient's vitals, labs and/or imaging results, diagnosis, and treatment plan were discussed with the patient. All new and/or changed medications were discussed - specifically to include route of administration, how often to take, when to take, and the pharmacy they were sent to. Strict return precautions as well as close follow up with PCP was discussed with the patient and the patient was agreeable to my recommendations.  Patient verbally acknowledged understanding. All labs, imaging were reviewed and used in the medical decision making process (if ordered).     Portions of this chart may have been written with voice recognition software.  Occasional grammatical errors, wrong word or \"sound a like\" substitutions may have occurred due to software limitations.  Please read carefully and use context to recognize where substitutions have occurred.      Problems Addressed:  Chest pain: acute illness or injury  Shortness of breath: acute illness or " injury    Amount and/or Complexity of Data Reviewed  External Data Reviewed: labs, radiology, ECG and notes.  Labs: ordered. Decision-making details documented in ED Course.  Radiology: ordered and independent interpretation performed.     Details: Per my interpretation no acute cardiopulmonary pathology.  ECG/medicine tests: ordered and independent interpretation performed. Decision-making details documented in ED Course.     Details: Per my interpretation normal sinus rhythm at 96 beats per minute.    Risk  OTC drugs.  Prescription drug management.        ED Course as of 09/29/24 1632   Sun Sep 29, 2024   1504 ECG 12 lead  Per my interpretation, normal sinus rhythm at 96 beats per minute.   1514 CBC without anemia or leukocytosis.    1518 Covid, influenza negative.   1518 CMP unremarkable.   1518 BNP: 45   1519 D-Dimer, Quant: 0.45   1519 hs TnI 0hr: 4       Medications   pantoprazole (PROTONIX) injection 40 mg (40 mg Intravenous Given 9/29/24 1608)   sucralfate (CARAFATE) tablet 1 g (1 g Oral Given 9/29/24 1609)   aluminum-magnesium hydroxide-simethicone (MAALOX) oral suspension 30 mL (30 mL Oral Given 9/29/24 1608)   acetaminophen (TYLENOL) tablet 975 mg (975 mg Oral Given 9/29/24 1609)   ipratropium-albuterol (DUO-NEB) 0.5-2.5 mg/3 mL inhalation solution 3 mL (3 mL Nebulization Given 9/29/24 1546)   Fluticasone Furoate-Vilanterol 100-25 mcg/actuation 1 puff (1 puff Inhalation Given 9/29/24 1607)       ED Risk Strat Scores   HEART Risk Score      Flowsheet Row Most Recent Value   Heart Score Risk Calculator    History 1 Filed at: 09/29/2024 1631   ECG 0 Filed at: 09/29/2024 1631   Age 2 Filed at: 09/29/2024 1631   Risk Factors 1 Filed at: 09/29/2024 1631   Troponin 0 Filed at: 09/29/2024 1631   HEART Score 4 Filed at: 09/29/2024 1631                           PERC Rule for PE      Flowsheet Row Most Recent Value   PERC Rule for PE    Age >=50 1 Filed at: 09/29/2024 1438   HR >=100 1 Filed at: 09/29/2024 1438    O2 Sat on room air < 95% 0 Filed at: 09/29/2024 1438   History of PE or DVT 0 Filed at: 09/29/2024 1438   Recent trauma or surgery 0 Filed at: 09/29/2024 1438   Hemoptysis 0 Filed at: 09/29/2024 1438   Exogenous estrogen 0 Filed at: 09/29/2024 1438   Unilateral leg swelling 0 Filed at: 09/29/2024 1438   PERC Rule for PE Results 2 Filed at: 09/29/2024 1438            SBIRT 22yo+      Flowsheet Row Most Recent Value   Initial Alcohol Screen: US AUDIT-C     1. How often do you have a drink containing alcohol? 0 Filed at: 09/29/2024 1500   2. How many drinks containing alcohol do you have on a typical day you are drinking?  0 Filed at: 09/29/2024 1500   3b. FEMALE Any Age, or MALE 65+: How often do you have 4 or more drinks on one occassion? 0 Filed at: 09/29/2024 1500   Audit-C Score 0 Filed at: 09/29/2024 1500   BLADIMIR: How many times in the past year have you...    Used an illegal drug or used a prescription medication for non-medical reasons? Never Filed at: 09/29/2024 1500            Wells' Criteria for PE      Flowsheet Row Most Recent Value   Wells' Criteria for PE    Clinical signs and symptoms of DVT 0 Filed at: 09/29/2024 1437   PE is primary diagnosis or equally likely 0 Filed at: 09/29/2024 1437   HR >100 1.5 Filed at: 09/29/2024 1437   Immobilization at least 3 days or Surgery in the previous 4 weeks 0 Filed at: 09/29/2024 1437   Previous, objectively diagnosed PE or DVT 0 Filed at: 09/29/2024 1437   Hemoptysis 0 Filed at: 09/29/2024 1437   Malignancy with treatment within 6 months or palliative 0 Filed at: 09/29/2024 1437   Wells' Criteria Total 1.5 Filed at: 09/29/2024 1437                        History of Present Illness       Chief Complaint   Patient presents with    Chest Pain     Left sided chest pain and left shoulder pain with sob since yesterday       Past Medical History:   Diagnosis Date    Anemia 12/12/2023    Asthma     Gastroesophageal reflux disease without esophagitis 12/26/2023    Gout  2021    Hypertension 10/25/2023    Lateral epicondylitis 10/9/2015    Stage 3 chronic kidney disease, unspecified whether stage 3a or 3b CKD (HCC) 2022      Past Surgical History:   Procedure Laterality Date    IMPLANTATION VAGAL NERVE STIMULATOR        Family History   Problem Relation Age of Onset    No Known Problems Mother     Kidney disease Father     No Known Problems Sister     No Known Problems Sister     No Known Problems Sister     Cancer Paternal Uncle     No Known Problems Maternal Grandmother     No Known Problems Maternal Grandfather     No Known Problems Paternal Grandmother     No Known Problems Paternal Grandfather       Social History     Tobacco Use    Smoking status: Former     Current packs/day: 0.00     Types: Cigarettes     Quit date:      Years since quittin.7    Smokeless tobacco: Former    Tobacco comments:     exposure to passive smoke   Vaping Use    Vaping status: Never Used   Substance Use Topics    Alcohol use: Not Currently    Drug use: Never      E-Cigarette/Vaping    E-Cigarette Use Never User       E-Cigarette/Vaping Substances    Nicotine No     THC No     CBD No     Flavoring No     Other No     Unknown No       I have reviewed and agree with the history as documented.     Diana is a 69 year old female with a PMHx GERD presenting to the ED for left sided chest pain, shortness of breath x three days but worsening today. The chest pain is located to the left side of her chest under her left upper arm. Pain does not radiate. Has been taking Pepto Bismol for relief because she assumed this was her GERD flaring up, but had to stop taking medications as a recommendation by her provider due to having a scope procedure scheduled in two days. Denies sick contacts or cold symptoms. Has never had this before. Former smoker. Denies recent injury, trauma, surgery, history of blood clots. She described the shortness of breath as feeling like she needs to take deeper  breaths in order to get good breaths.       Review of Systems   Constitutional:  Negative for chills and fever.   Respiratory:  Positive for shortness of breath. Negative for cough.    Cardiovascular:  Positive for chest pain and palpitations (earlier this morning felt fluttering).   Gastrointestinal:  Negative for nausea and vomiting.   Musculoskeletal:  Negative for myalgias, neck pain and neck stiffness.   Skin:  Negative for rash.   Neurological:  Negative for seizures, syncope, speech difficulty, weakness, light-headedness, numbness and headaches.           Objective       ED Triage Vitals   Temperature Pulse Blood Pressure Respirations SpO2 Patient Position - Orthostatic VS   09/29/24 1427 09/29/24 1427 09/29/24 1427 09/29/24 1427 09/29/24 1427 09/29/24 1427   98 °F (36.7 °C) 101 150/100 14 96 % Sitting      Temp Source Heart Rate Source BP Location FiO2 (%) Pain Score    09/29/24 1427 09/29/24 1427 09/29/24 1427 -- 09/29/24 1609    Oral Monitor Right arm  4      Vitals      Date and Time Temp Pulse SpO2 Resp BP Pain Score FACES Pain Rating User   09/29/24 1609 -- -- -- -- -- 4 -- LD   09/29/24 1427 98 °F (36.7 °C) 101 96 % 14 150/100 -- -- JW            Physical Exam  Vitals reviewed.   Constitutional:       General: She is not in acute distress.     Appearance: Normal appearance. She is ill-appearing. She is not toxic-appearing or diaphoretic.   HENT:      Head: Normocephalic and atraumatic.      Right Ear: External ear normal.      Left Ear: External ear normal.      Nose: Nose normal.   Eyes:      General: No scleral icterus.        Right eye: No discharge.         Left eye: No discharge.      Extraocular Movements: Extraocular movements intact.      Conjunctiva/sclera: Conjunctivae normal.   Cardiovascular:      Rate and Rhythm: Regular rhythm. Tachycardia present.      Pulses: Normal pulses.   Pulmonary:      Effort: No respiratory distress.      Breath sounds: Normal breath sounds.      Comments:  Patient seen taking deeper breaths but no tachypnea.  Chest:      Chest wall: Tenderness present.   Musculoskeletal:         General: Normal range of motion.      Cervical back: Normal range of motion and neck supple. No rigidity or tenderness.   Lymphadenopathy:      Cervical: No cervical adenopathy.   Skin:     General: Skin is warm and dry.      Capillary Refill: Capillary refill takes less than 2 seconds.   Neurological:      General: No focal deficit present.      Mental Status: She is alert.   Psychiatric:         Mood and Affect: Mood normal.         Behavior: Behavior normal.         Results Reviewed       Procedure Component Value Units Date/Time    D-Dimer [[id number]]  (Normal) Collected: 09/29/24 1451    Lab Status: Final result Specimen: Blood from Arm, Left Updated: 09/29/24 1519     D-Dimer, Quant 0.45 ug/ml FEU     Narrative:      In the evaluation for possible pulmonary embolism, in the appropriate (Well's Score of 4 or less) patient, the age adjusted d-dimer cutoff for this patient can be calculated as:    Age x 0.01 (in ug/mL) for Age-adjusted D-dimer exclusion threshold for a patient over 50 years.    HS Troponin 0hr (reflex protocol) [[id number]]  (Normal) Collected: 09/29/24 1451    Lab Status: Final result Specimen: Blood from Arm, Left Updated: 09/29/24 1519     hs TnI 0hr 4 ng/L     B-Type Natriuretic Peptide(BNP) [[id number]]  (Normal) Collected: 09/29/24 1451    Lab Status: Final result Specimen: Blood from Arm, Left Updated: 09/29/24 1518     BNP 45 pg/mL     Comprehensive metabolic panel [[id number]] Collected: 09/29/24 1451    Lab Status: Final result Specimen: Blood from Arm, Left Updated: 09/29/24 1516     Sodium 141 mmol/L      Potassium 3.6 mmol/L      Chloride 107 mmol/L      CO2 22 mmol/L      ANION GAP 12 mmol/L      BUN 15 mg/dL      Creatinine 0.88 mg/dL      Glucose 100 mg/dL      Calcium 10.1 mg/dL      AST 24 U/L      ALT 23 U/L      Alkaline Phosphatase 57 U/L      Total  Protein 8.0 g/dL      Albumin 4.3 g/dL      Total Bilirubin 0.63 mg/dL      eGFR 67 ml/min/1.73sq m     Narrative:      National Kidney Disease Foundation guidelines for Chronic Kidney Disease (CKD):     Stage 1 with normal or high GFR (GFR > 90 mL/min/1.73 square meters)    Stage 2 Mild CKD (GFR = 60-89 mL/min/1.73 square meters)    Stage 3A Moderate CKD (GFR = 45-59 mL/min/1.73 square meters)    Stage 3B Moderate CKD (GFR = 30-44 mL/min/1.73 square meters)    Stage 4 Severe CKD (GFR = 15-29 mL/min/1.73 square meters)    Stage 5 End Stage CKD (GFR <15 mL/min/1.73 square meters)  Note: GFR calculation is accurate only with a steady state creatinine    FLU/COVID Rapid Antigen (30 min. TAT) - Preferred screening test in ED [[id number]]  (Normal) Collected: 09/29/24 1451    Lab Status: Final result Specimen: Nares from Nose Updated: 09/29/24 1515     SARS COV Rapid Antigen Negative     Influenza A Rapid Antigen Negative     Influenza B Rapid Antigen Negative    Narrative:      This test has been performed using the [employer] Thelma 2 FLU+SARS Antigen test under the Emergency Use Authorization (EUA). This test has been validated by the  and verified by the performing laboratory. The Thelma uses lateral flow immunofluorescent sandwich assay to detect SARS-COV, Influenza A and Influenza B Antigen.     The [employer] Thelma 2 SARS Antigen test does not differentiate between SARS-CoV and SARS-CoV-2.     Negative results are presumptive and may be confirmed with a molecular assay, if necessary, for patient management. Negative results do not rule out SARS-CoV-2 or influenza infection and should not be used as the sole basis for treatment or patient management decisions. A negative test result may occur if the level of antigen in a sample is below the limit of detection of this test.     Positive results are indicative of the presence of viral antigens, but do not rule out bacterial infection or co-infection with other  viruses.     All test results should be used as an adjunct to clinical observations and other information available to the provider.    FOR PEDIATRIC PATIENTS - copy/paste COVID Guidelines URL to browser: [email or web address]    CBC and differential [[id number]]  (Abnormal) Collected: 09/29/24 1451    Lab Status: Final result Specimen: Blood from Arm, Left Updated: 09/29/24 1507     WBC 5.64 Thousand/uL      RBC 5.22 Million/uL      Hemoglobin 13.1 g/dL      Hematocrit 41.1 %      MCV 79 fL      MCH 25.1 pg      MCHC 31.9 g/dL      MPV 10.6 fL      Platelets 231 Thousands/uL      nRBC 0 /100 WBCs      Segmented % 63 %      Immature Grans % 0 %      Lymphocytes % 28 %      Monocytes % 6 %      Eosinophils Relative 2 %      Basophils Relative 1 %      Absolute Neutrophils 3.54 Thousands/µL      Absolute Immature Grans 0.01 Thousand/uL      Absolute Lymphocytes 1.55 Thousands/µL      Absolute Monocytes 0.35 Thousand/µL      Eosinophils Absolute 0.13 Thousand/µL      Basophils Absolute 0.06 Thousands/µL             XR chest 1 view portable    (Results Pending)       ECG 12 Lead Documentation Only    Date/Time: 9/29/2024 2:35 PM    Performed by: Monica Lew PA-C  Authorized by: Monica Lew PA-C    Indications / Diagnosis:  Chest pain, sob  ECG reviewed by me, the ED Provider: yes    Patient location:  ED  Previous ECG:     Previous ECG:  Compared to current    Similarity:  No change    Comparison to cardiac monitor: Yes    Rate:     ECG rate:  96    ECG rate assessment: normal    Rhythm:     Rhythm: sinus rhythm    Ectopy:     Ectopy: none    QRS:     QRS axis:  Normal    QRS intervals:  Normal  Conduction:     Conduction: normal    ST segments:     ST segments:  Normal  T waves:     T waves: normal        ED Medication and Procedure Management   Prior to Admission Medications   Prescriptions Last Dose Informant Patient Reported? Taking?    Ascorbic Acid (vitamin C) 100 MG tablet  Self No No   Sig: Take 1 tablet (100 mg total) by mouth daily   Ascorbic Acid (vitamin C) 1000 MG tablet  Self Yes No   Sig: Take 1,000 mg by mouth daily   Fluticasone-Salmeterol (Advair) 100-50 mcg/dose inhaler   No No   Sig: Inhale 1 puff 2 (two) times a day Rinse mouth after use.   Patient not taking: Reported on 12/26/2023   albuterol (PROVENTIL HFA,VENTOLIN HFA) 90 mcg/act inhaler  Self No No   Sig: INHALE 1 PUFF BY MOUTH EVERY 6 HOURS   aspirin (ECOTRIN) 325 mg EC tablet  Self No No   Sig: Take 1 tablet (325 mg total) by mouth daily   benzonatate (TESSALON PERLES) 100 mg capsule  Self No No   Sig: Take 1 capsule (100 mg total) by mouth 3 (three) times a day as needed for cough   cholecalciferol (VITAMIN D3) 1,000 units tablet  Self No No   Sig: TAKE 4 TABLETS BY MOUTH EVERY DAY   clindamycin (CLEOCIN T) 1 % lotion  Self No No   Sig: Apply topically 2 (two) times a day   Patient not taking: Reported on 3/25/2024   ferrous sulfate 324 (65 Fe) mg  Self No No   Sig: TAKE 1 TABLET(324 MG) BY MOUTH DAILY BEFORE BREAKFAST   lisinopril (ZESTRIL) 5 mg tablet  Self No No   Sig: TAKE 1 TABLET(5 MG) BY MOUTH DAILY   oxybutynin (DITROPAN-XL) 10 MG 24 hr tablet   No No   Sig: TAKE 1 TABLET(10 MG) BY MOUTH DAILY   pantoprazole (PROTONIX) 20 mg tablet  Self No No   Sig: TAKE 1 TABLET(20 MG) BY MOUTH DAILY BEFORE BREAKFAST   pravastatin (PRAVACHOL) 10 mg tablet   No No   Sig: TAKE 1 TABLET(10 MG) BY MOUTH DAILY AT BEDTIME   zolpidem (AMBIEN) 5 mg tablet   No No   Sig: Take 1 tablet (5 mg total) by mouth as needed for sleep (for use during sleep study) for up to 2 days      Facility-Administered Medications: None     Current Discharge Medication List        CONTINUE these medications which have NOT CHANGED    Details   albuterol (PROVENTIL HFA,VENTOLIN HFA) 90 mcg/act inhaler INHALE 1 PUFF BY MOUTH EVERY 6 HOURS  Qty: 18 g, Refills: 3    Associated Diagnoses: Dyspnea, unspecified type       !! Ascorbic Acid (vitamin C) 100 MG tablet Take 1 tablet (100 mg total) by mouth daily  Qty: 90 tablet, Refills: 1    Associated Diagnoses: Iron deficiency anemia secondary to inadequate dietary iron intake      !! Ascorbic Acid (vitamin C) 1000 MG tablet Take 1,000 mg by mouth daily      aspirin (ECOTRIN) 325 mg EC tablet Take 1 tablet (325 mg total) by mouth daily  Qty: 30 tablet, Refills: 2    Associated Diagnoses: Hyperlipidemia, unspecified hyperlipidemia type      benzonatate (TESSALON PERLES) 100 mg capsule Take 1 capsule (100 mg total) by mouth 3 (three) times a day as needed for cough  Qty: 20 capsule, Refills: 0    Associated Diagnoses: Subacute cough      cholecalciferol (VITAMIN D3) 1,000 units tablet TAKE 4 TABLETS BY MOUTH EVERY DAY  Qty: 360 tablet, Refills: 1    Associated Diagnoses: Vitamin D deficiency      clindamycin (CLEOCIN T) 1 % lotion Apply topically 2 (two) times a day  Qty: 60 mL, Refills: 0    Associated Diagnoses: Rosacea      ferrous sulfate 324 (65 Fe) mg TAKE 1 TABLET(324 MG) BY MOUTH DAILY BEFORE BREAKFAST  Qty: 60 tablet, Refills: 3    Associated Diagnoses: Anemia      Fluticasone-Salmeterol (Advair) 100-50 mcg/dose inhaler Inhale 1 puff 2 (two) times a day Rinse mouth after use.  Qty: 60 blister, Refills: 5    Comments: Substitution to a formulary equivalent within the same pharmaceutical class is authorized.  Associated Diagnoses: Dyspnea, unspecified type      lisinopril (ZESTRIL) 5 mg tablet TAKE 1 TABLET(5 MG) BY MOUTH DAILY  Qty: 90 tablet, Refills: 0    Associated Diagnoses: Primary hypertension      oxybutynin (DITROPAN-XL) 10 MG 24 hr tablet TAKE 1 TABLET(10 MG) BY MOUTH DAILY  Qty: 90 tablet, Refills: 1    Associated Diagnoses: Mixed stress and urge urinary incontinence      pantoprazole (PROTONIX) 20 mg tablet TAKE 1 TABLET(20 MG) BY MOUTH DAILY BEFORE BREAKFAST  Qty: 90 tablet, Refills: 1    Associated Diagnoses: Gastroesophageal reflux disease without esophagitis       pravastatin (PRAVACHOL) 10 mg tablet TAKE 1 TABLET(10 MG) BY MOUTH DAILY AT BEDTIME  Qty: 90 tablet, Refills: 0    Associated Diagnoses: Mixed hyperlipidemia      zolpidem (AMBIEN) 5 mg tablet Take 1 tablet (5 mg total) by mouth as needed for sleep (for use during sleep study) for up to 2 days  Qty: 2 tablet, Refills: 0    Associated Diagnoses: Other insomnia       !! - Potential duplicate medications found. Please discuss with provider.        No discharge procedures on file.  ED SEPSIS DOCUMENTATION   Time reflects when diagnosis was documented in both MDM as applicable and the Disposition within this note       Time User Action Codes Description Comment    9/29/2024  4:00 PM Monica Lew [R07.9] Chest pain     9/29/2024  4:00 PM Monica Lew [R06.02] Shortness of breath                  Monica Lew PA-C  09/29/24 1631       Monica Lew PA-C  09/29/24 1632

## 2024-10-01 ENCOUNTER — HOSPITAL ENCOUNTER (OUTPATIENT)
Dept: GASTROENTEROLOGY | Facility: HOSPITAL | Age: 69
Discharge: HOME | End: 2024-10-01
Payer: COMMERCIAL

## 2024-10-01 DIAGNOSIS — D50.0 IRON DEFICIENCY ANEMIA DUE TO CHRONIC BLOOD LOSS: ICD-10-CM

## 2024-10-01 PROCEDURE — 91110 GI TRC IMG INTRAL ESOPH-ILE: CPT

## 2024-10-01 NOTE — PERIOPERATIVE NURSING NOTE
Patient swallowed capsule without difficulty.  Capsule placement confirmed by visualization of gastric folds.

## 2024-10-09 ENCOUNTER — TELEPHONE (OUTPATIENT)
Age: 69
End: 2024-10-09

## 2024-10-09 NOTE — TELEPHONE ENCOUNTER
Pt called stated she alot going on right now but will call to r/s after the holidays. Pt canceled both 10/13 diagnostic sleep study & 10/25 cpap night study.

## 2024-10-17 ENCOUNTER — TELEPHONE (OUTPATIENT)
Dept: GASTROENTEROLOGY | Facility: CLINIC | Age: 69
End: 2024-10-17

## 2024-10-17 NOTE — TELEPHONE ENCOUNTER
I spoke to the patient regarding her capsule results. 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 She got a few IV iron infusions last month. Her hemoglobin has come up to normal. She denies overt bleeding.  I recommend continuing to monitor her stools and if she would notice black tarry stools or red blood in the stool, she should call our office or go to the hospital. She is due for repeat blood work next month and follow-up with hematology. If her blood count would decline, we would consider balloon enteroscopy for treatment of the AVM.  She expressed understanding and all questions were answered.

## 2024-10-22 DIAGNOSIS — D50.8 IRON DEFICIENCY ANEMIA SECONDARY TO INADEQUATE DIETARY IRON INTAKE: Primary | ICD-10-CM

## 2024-10-22 RX ORDER — MULTIVIT WITH MINERALS/LUTEIN
1000 TABLET ORAL DAILY
Qty: 90 TABLET | Refills: 1 | Status: SHIPPED | OUTPATIENT
Start: 2024-10-22

## 2024-10-22 NOTE — TELEPHONE ENCOUNTER
Reason for call:   [x] Refill   [] Prior Auth  [] Other:     Office:   [x] PCP/Provider -  BRIE Hinojosa   [] Specialty/Provider -     Medication: Ascorbic Acid (vitamin C) 1000 MG tablet     Dose/Frequency:     1,000 mg, Oral, Daily       Quantity: 90    Pharmacy: : [id number] [place], [place] - [facility] [address]     Does the patient have enough for 3 days?   [] Yes   [x] No - Send as HP to POD

## 2024-11-07 ENCOUNTER — TELEPHONE (OUTPATIENT)
Dept: ADMINISTRATIVE | Facility: OTHER | Age: 69
End: 2024-11-07

## 2024-11-07 DIAGNOSIS — R06.00 DYSPNEA, UNSPECIFIED TYPE: ICD-10-CM

## 2024-11-07 RX ORDER — ALBUTEROL SULFATE 90 UG/1
1 INHALANT RESPIRATORY (INHALATION) EVERY 6 HOURS
Qty: 18 G | Refills: 0 | Status: SHIPPED | OUTPATIENT
Start: 2024-11-07

## 2024-11-07 NOTE — TELEPHONE ENCOUNTER
Reason for call:   [x] Refill   [] Prior Auth  [] Other:     Office:   [x] PCP/Provider -   [] Specialty/Provider -     Medication:   Albuterol inhaler 90mcg- inhale 1 puff by mouth every 6 hours      Pharmacy: [facility] [address] [place]    Does the patient have enough for 3 days?   [] Yes   [x] No - Send as HP to POD

## 2024-11-19 ENCOUNTER — TELEPHONE (OUTPATIENT)
Dept: NEPHROLOGY | Facility: CLINIC | Age: 69
End: 2024-11-19

## 2024-11-19 DIAGNOSIS — N18.30 STAGE 3 CHRONIC KIDNEY DISEASE, UNSPECIFIED WHETHER STAGE 3A OR 3B CKD (HCC): ICD-10-CM

## 2024-11-19 DIAGNOSIS — N18.31 STAGE 3A CHRONIC KIDNEY DISEASE (HCC): Primary | Chronic | ICD-10-CM

## 2024-11-19 DIAGNOSIS — E78.2 MIXED HYPERLIPIDEMIA: ICD-10-CM

## 2024-11-19 DIAGNOSIS — I10 PRIMARY HYPERTENSION: Chronic | ICD-10-CM

## 2024-11-20 ENCOUNTER — APPOINTMENT (OUTPATIENT)
Dept: LAB | Facility: HOSPITAL | Age: 69
End: 2024-11-20
Payer: COMMERCIAL

## 2024-11-20 ENCOUNTER — RESULTS FOLLOW-UP (OUTPATIENT)
Dept: GASTROENTEROLOGY | Facility: CLINIC | Age: 69
End: 2024-11-20

## 2024-11-20 DIAGNOSIS — D64.9 SYMPTOMATIC ANEMIA: ICD-10-CM

## 2024-11-20 DIAGNOSIS — D50.8 OTHER IRON DEFICIENCY ANEMIA: ICD-10-CM

## 2024-11-20 DIAGNOSIS — E53.8 DEFICIENCY OF OTHER SPECIFIED B GROUP VITAMINS: ICD-10-CM

## 2024-11-20 DIAGNOSIS — D50.0 IRON DEFICIENCY ANEMIA DUE TO CHRONIC BLOOD LOSS: ICD-10-CM

## 2024-11-20 DIAGNOSIS — K86.9 LESION OF PANCREAS: ICD-10-CM

## 2024-11-20 LAB
BASOPHILS # BLD AUTO: 0.04 THOUSANDS/ÂΜL (ref 0–0.1)
BASOPHILS NFR BLD AUTO: 1 % (ref 0–1)
BUN SERPL-MCNC: 19 MG/DL (ref 5–25)
CREAT SERPL-MCNC: 0.95 MG/DL (ref 0.6–1.3)
EOSINOPHIL # BLD AUTO: 0.21 THOUSAND/ÂΜL (ref 0–0.61)
EOSINOPHIL NFR BLD AUTO: 4 % (ref 0–6)
ERYTHROCYTE [DISTWIDTH] IN BLOOD BY AUTOMATED COUNT: 18.8 % (ref 11.6–15.1)
FERRITIN SERPL-MCNC: 9 NG/ML (ref 11–307)
FOLATE SERPL-MCNC: >22.3 NG/ML
GFR SERPL CREATININE-BSD FRML MDRD: 61 ML/MIN/1.73SQ M
HCT VFR BLD AUTO: 37.5 % (ref 34.8–46.1)
HGB BLD-MCNC: 12.1 G/DL (ref 11.5–15.4)
IGA SERPL-MCNC: 273 MG/DL (ref 66–433)
IMM GRANULOCYTES # BLD AUTO: 0.02 THOUSAND/UL (ref 0–0.2)
IMM GRANULOCYTES NFR BLD AUTO: 0 % (ref 0–2)
IRON SATN MFR SERPL: 8 % (ref 15–50)
IRON SERPL-MCNC: 33 UG/DL (ref 50–212)
LYMPHOCYTES # BLD AUTO: 1.02 THOUSANDS/ÂΜL (ref 0.6–4.47)
LYMPHOCYTES NFR BLD AUTO: 20 % (ref 14–44)
MCH RBC QN AUTO: 26.9 PG (ref 26.8–34.3)
MCHC RBC AUTO-ENTMCNC: 32.3 G/DL (ref 31.4–37.4)
MCV RBC AUTO: 83 FL (ref 82–98)
MONOCYTES # BLD AUTO: 0.29 THOUSAND/ÂΜL (ref 0.17–1.22)
MONOCYTES NFR BLD AUTO: 6 % (ref 4–12)
NEUTROPHILS # BLD AUTO: 3.56 THOUSANDS/ÂΜL (ref 1.85–7.62)
NEUTS SEG NFR BLD AUTO: 69 % (ref 43–75)
NRBC BLD AUTO-RTO: 0 /100 WBCS
PLATELET # BLD AUTO: 164 THOUSANDS/UL (ref 149–390)
PMV BLD AUTO: 10.5 FL (ref 8.9–12.7)
RBC # BLD AUTO: 4.5 MILLION/UL (ref 3.81–5.12)
TIBC SERPL-MCNC: 437 UG/DL (ref 250–450)
UIBC SERPL-MCNC: 404 UG/DL (ref 155–355)
VIT B12 SERPL-MCNC: 321 PG/ML (ref 180–914)
WBC # BLD AUTO: 5.14 THOUSAND/UL (ref 4.31–10.16)

## 2024-11-20 PROCEDURE — 85025 COMPLETE CBC W/AUTO DIFF WBC: CPT

## 2024-11-20 PROCEDURE — 84520 ASSAY OF UREA NITROGEN: CPT

## 2024-11-20 PROCEDURE — 83540 ASSAY OF IRON: CPT

## 2024-11-20 PROCEDURE — 83550 IRON BINDING TEST: CPT

## 2024-11-20 PROCEDURE — 82565 ASSAY OF CREATININE: CPT

## 2024-11-20 PROCEDURE — 36415 COLL VENOUS BLD VENIPUNCTURE: CPT

## 2024-11-20 PROCEDURE — 82746 ASSAY OF FOLIC ACID SERUM: CPT

## 2024-11-20 PROCEDURE — 82607 VITAMIN B-12: CPT

## 2024-11-20 PROCEDURE — 82728 ASSAY OF FERRITIN: CPT

## 2024-11-20 PROCEDURE — 82784 ASSAY IGA/IGD/IGG/IGM EACH: CPT

## 2024-11-20 PROCEDURE — 86364 TISS TRNSGLTMNASE EA IG CLAS: CPT

## 2024-11-21 LAB
TTG IGA SER-ACNC: <0.5 U/ML
TTG IGA SER-ACNC: NEGATIVE

## 2024-11-23 ENCOUNTER — HOSPITAL ENCOUNTER (EMERGENCY)
Facility: HOSPITAL | Age: 69
Discharge: HOME | End: 2024-11-23
Attending: EMERGENCY MEDICINE
Payer: COMMERCIAL

## 2024-11-23 ENCOUNTER — NURSE TRIAGE (OUTPATIENT)
Dept: OTHER | Facility: OTHER | Age: 69
End: 2024-11-23

## 2024-11-23 VITALS
HEART RATE: 66 BPM | OXYGEN SATURATION: 93 % | RESPIRATION RATE: 20 BRPM | SYSTOLIC BLOOD PRESSURE: 137 MMHG | DIASTOLIC BLOOD PRESSURE: 79 MMHG | TEMPERATURE: 98 F

## 2024-11-23 DIAGNOSIS — K92.1 HEMATOCHEZIA: Primary | ICD-10-CM

## 2024-11-23 LAB
ABO GROUP BLD: NORMAL
ALBUMIN SERPL BCG-MCNC: 3.8 G/DL (ref 3.5–5)
ALP SERPL-CCNC: 50 U/L (ref 34–104)
ALT SERPL W P-5'-P-CCNC: 15 U/L (ref 7–52)
ANION GAP SERPL CALCULATED.3IONS-SCNC: 11 MMOL/L (ref 4–13)
APTT PPP: 32 SECONDS (ref 23–34)
AST SERPL W P-5'-P-CCNC: 20 U/L (ref 13–39)
BASOPHILS # BLD AUTO: 0.04 THOUSANDS/ÂΜL (ref 0–0.1)
BASOPHILS NFR BLD AUTO: 1 % (ref 0–1)
BILIRUB SERPL-MCNC: 0.41 MG/DL (ref 0.2–1)
BLD GP AB SCN SERPL QL: NEGATIVE
BUN SERPL-MCNC: 15 MG/DL (ref 5–25)
CALCIUM SERPL-MCNC: 9.3 MG/DL (ref 8.4–10.2)
CHLORIDE SERPL-SCNC: 111 MMOL/L (ref 96–108)
CO2 SERPL-SCNC: 22 MMOL/L (ref 21–32)
CREAT SERPL-MCNC: 1.03 MG/DL (ref 0.6–1.3)
EOSINOPHIL # BLD AUTO: 0.15 THOUSAND/ÂΜL (ref 0–0.61)
EOSINOPHIL NFR BLD AUTO: 3 % (ref 0–6)
ERYTHROCYTE [DISTWIDTH] IN BLOOD BY AUTOMATED COUNT: 18.3 % (ref 11.6–15.1)
GFR SERPL CREATININE-BSD FRML MDRD: 55 ML/MIN/1.73SQ M
GLUCOSE SERPL-MCNC: 113 MG/DL (ref 65–140)
HCT VFR BLD AUTO: 36.6 % (ref 34.8–46.1)
HGB BLD-MCNC: 11.8 G/DL (ref 11.5–15.4)
IMM GRANULOCYTES # BLD AUTO: 0.02 THOUSAND/UL (ref 0–0.2)
IMM GRANULOCYTES NFR BLD AUTO: 0 % (ref 0–2)
INR PPP: 1.05 (ref 0.85–1.19)
LYMPHOCYTES # BLD AUTO: 1.41 THOUSANDS/ÂΜL (ref 0.6–4.47)
LYMPHOCYTES NFR BLD AUTO: 29 % (ref 14–44)
MCH RBC QN AUTO: 27.2 PG (ref 26.8–34.3)
MCHC RBC AUTO-ENTMCNC: 32.2 G/DL (ref 31.4–37.4)
MCV RBC AUTO: 84 FL (ref 82–98)
MONOCYTES # BLD AUTO: 0.32 THOUSAND/ÂΜL (ref 0.17–1.22)
MONOCYTES NFR BLD AUTO: 7 % (ref 4–12)
NEUTROPHILS # BLD AUTO: 2.92 THOUSANDS/ÂΜL (ref 1.85–7.62)
NEUTS SEG NFR BLD AUTO: 60 % (ref 43–75)
NRBC BLD AUTO-RTO: 0 /100 WBCS
PLATELET # BLD AUTO: 184 THOUSANDS/UL (ref 149–390)
PMV BLD AUTO: 10.4 FL (ref 8.9–12.7)
POTASSIUM SERPL-SCNC: 3.6 MMOL/L (ref 3.5–5.3)
PROT SERPL-MCNC: 7.6 G/DL (ref 6.4–8.4)
PROTHROMBIN TIME: 14 SECONDS (ref 12.3–15)
RBC # BLD AUTO: 4.34 MILLION/UL (ref 3.81–5.12)
RH BLD: POSITIVE
SODIUM SERPL-SCNC: 144 MMOL/L (ref 135–147)
SPECIMEN EXPIRATION DATE: NORMAL
WBC # BLD AUTO: 4.86 THOUSAND/UL (ref 4.31–10.16)

## 2024-11-23 PROCEDURE — 99285 EMERGENCY DEPT VISIT HI MDM: CPT

## 2024-11-23 PROCEDURE — 86900 BLOOD TYPING SEROLOGIC ABO: CPT | Performed by: EMERGENCY MEDICINE

## 2024-11-23 PROCEDURE — 86850 RBC ANTIBODY SCREEN: CPT | Performed by: EMERGENCY MEDICINE

## 2024-11-23 PROCEDURE — 85025 COMPLETE CBC W/AUTO DIFF WBC: CPT | Performed by: EMERGENCY MEDICINE

## 2024-11-23 PROCEDURE — 99284 EMERGENCY DEPT VISIT MOD MDM: CPT | Performed by: PHYSICIAN ASSISTANT

## 2024-11-23 PROCEDURE — 86901 BLOOD TYPING SEROLOGIC RH(D): CPT | Performed by: EMERGENCY MEDICINE

## 2024-11-23 PROCEDURE — 36415 COLL VENOUS BLD VENIPUNCTURE: CPT | Performed by: EMERGENCY MEDICINE

## 2024-11-23 PROCEDURE — 80053 COMPREHEN METABOLIC PANEL: CPT | Performed by: EMERGENCY MEDICINE

## 2024-11-23 PROCEDURE — 85730 THROMBOPLASTIN TIME PARTIAL: CPT | Performed by: EMERGENCY MEDICINE

## 2024-11-23 PROCEDURE — 85610 PROTHROMBIN TIME: CPT | Performed by: EMERGENCY MEDICINE

## 2024-11-24 NOTE — DISCHARGE INSTRUCTIONS
Follow-up with your appointment on Monday.  Return immediately for new or worsening complaints including dizziness, shortness of breath, weakness, pale skin, passage of clots or increased bleeding.

## 2024-11-24 NOTE — ED PROVIDER NOTES
Time reflects when diagnosis was documented in both MDM as applicable and the Disposition within this note       Time User Action Codes Description Comment    11/23/2024 10:49 PM Dionne Candelario Add [K62.5] Rectal bleeding     11/23/2024 10:49 PM Dionne Candelario Remove [K62.5] Rectal bleeding     11/23/2024 10:49 PM Dionne Candelario Add [K92.1] Hematochezia           ED Disposition       ED Disposition   Discharge    Condition   Stable    Date/Time   Sat Nov 23, 2024 10:49 PM    Comment   Diana Conner discharge to home/self care.                   Assessment & Plan       Medical Decision Making  Case was discussed with attending.  Patient presented with history of diverticulosis and iron deficiency anemia with current blood while wiping and in the bowl after bowel movements.  Patient had 1 bloody bowel movement while in the emergency department but did not have any gross bright red blood per rectum.  Hemoccult positive however patient's vital signs stable with a heart rate of 66 and blood pressure 137/79.  Patient's hemoglobin stable at 11.8.  Patient currently on Plavix but not otherwise anticoagulated.  No gross bleeding per rectum was noted on exam.  Shared decision-making discussion had with patient regarding discharge with strict return precautions.  Patient is in agreement with plan and agrees to return for any worsening complaints.  Patient remained hemodynamically stable throughout ED visit patient ambulated out of the department with understanding of strict return precautions.    Amount and/or Complexity of Data Reviewed  Labs: ordered.             Medications - No data to display    ED Risk Strat Scores                           SBIRT 22yo+      Flowsheet Row Most Recent Value   Initial Alcohol Screen: US AUDIT-C     1. How often do you have a drink containing alcohol? 0 Filed at: 11/23/2024 4651   2. How many drinks containing alcohol do you have on a typical day you are drinking?  0 Filed at:  2024   3b. FEMALE Any Age, or MALE 65+: How often do you have 4 or more drinks on one occassion? 0 Filed at: 2024   Audit-C Score 0 Filed at: 2024   BLADIMIR: How many times in the past year have you...    Used an illegal drug or used a prescription medication for non-medical reasons? Never Filed at: 2024                            History of Present Illness       Chief Complaint   Patient presents with    Rectal Bleeding     Pt reports 3 hours of rectal bleeding while defecating, also reports watery stools.  Has prior hx, reports last year she had the same issues that needed admission.  States she needed a blood transfusion and iron transfusion during that admission.  Had recent blood work done this week.        Past Medical History:   Diagnosis Date    Anemia 2023    Asthma     Gastroesophageal reflux disease without esophagitis 2023    Gout 2021    Hypertension 10/25/2023    Lateral epicondylitis 10/9/2015    Stage 3 chronic kidney disease, unspecified whether stage 3a or 3b CKD (HCC) 2022      Past Surgical History:   Procedure Laterality Date    IMPLANTATION VAGAL NERVE STIMULATOR        Family History   Problem Relation Age of Onset    No Known Problems Mother     Kidney disease Father     No Known Problems Sister     No Known Problems Sister     No Known Problems Sister     Cancer Paternal Uncle     No Known Problems Maternal Grandmother     No Known Problems Maternal Grandfather     No Known Problems Paternal Grandmother     No Known Problems Paternal Grandfather       Social History     Tobacco Use    Smoking status: Former     Current packs/day: 0.00     Types: Cigarettes     Quit date:      Years since quittin.9    Smokeless tobacco: Former    Tobacco comments:     exposure to passive smoke   Vaping Use    Vaping status: Never Used   Substance Use Topics    Alcohol use: Not Currently    Drug use: Never      E-Cigarette/Vaping     E-Cigarette Use Never User       E-Cigarette/Vaping Substances    Nicotine No     THC No     CBD No     Flavoring No     Other No     Unknown No       I have reviewed and agree with the history as documented.     69-year-old female with history of diverticulosis and iron deficiency anemia presents complaining of blood noted while wiping after a bowel movement today.  Patient states that she had a couple drops of blood in the bowl and while wiping.  Patient became nervous because of her history of diverticulosis requiring admission and transfusion in the past.  Patient states that all symptoms started today and denies any other complaints including abdominal pain, pallor, weakness or dizziness.  Ambulating without difficulty.  Denies any other complaints.      History provided by:  Patient   used: No        Review of Systems   Constitutional: Negative.  Negative for chills and fatigue.   HENT:  Negative for ear pain and sore throat.    Eyes:  Negative for photophobia and redness.   Respiratory:  Negative for apnea, cough and shortness of breath.    Cardiovascular:  Negative for chest pain.   Gastrointestinal:  Positive for blood in stool. Negative for abdominal pain, nausea and vomiting.   Genitourinary:  Negative for dysuria.   Musculoskeletal:  Negative for arthralgias, neck pain and neck stiffness.   Skin:  Negative for rash.   Neurological:  Negative for dizziness, tremors, syncope and weakness.   Psychiatric/Behavioral:  Negative for suicidal ideas.            Objective       ED Triage Vitals [11/23/24 2137]   Temperature Pulse Blood Pressure Respirations SpO2 Patient Position - Orthostatic VS   98 °F (36.7 °C) 100 (!) 186/94 16 94 % Sitting      Temp Source Heart Rate Source BP Location FiO2 (%) Pain Score    Tympanic Monitor Left arm -- --      Vitals      Date and Time Temp Pulse SpO2 Resp BP Pain Score FACES Pain Rating User   11/23/24 2306 -- 66 93 % 20 137/79 -- -- MM   11/23/24 2204 --  74 94 % 18 157/97 -- -- MN   11/23/24 2137 98 °F (36.7 °C) 100 94 % 16 186/94 -- -- RG            Physical Exam  Constitutional:       General: She is not in acute distress.     Appearance: She is well-developed. She is not diaphoretic.   Eyes:      Pupils: Pupils are equal, round, and reactive to light.   Cardiovascular:      Rate and Rhythm: Normal rate and regular rhythm.   Pulmonary:      Effort: Pulmonary effort is normal. No respiratory distress.      Breath sounds: Normal breath sounds.   Abdominal:      General: Bowel sounds are normal. There is no distension.      Palpations: Abdomen is soft.   Genitourinary:     Rectum: Guaiac result positive.      Comments: No gross bright red blood per rectum noted.  Positive Hemoccult.  Minor nonthrombosed external hemorrhoids noted without acute bleeding.  No fissure.  Musculoskeletal:         General: Normal range of motion.      Cervical back: Normal range of motion and neck supple.   Skin:     General: Skin is warm and dry.   Neurological:      Mental Status: She is alert and oriented to person, place, and time.         Results Reviewed       Procedure Component Value Units Date/Time    Comprehensive metabolic panel [[id number]]  (Abnormal) Collected: 11/23/24 2132    Lab Status: Final result Specimen: Blood from Hand, Right Updated: 11/23/24 2157     Sodium 144 mmol/L      Potassium 3.6 mmol/L      Chloride 111 mmol/L      CO2 22 mmol/L      ANION GAP 11 mmol/L      BUN 15 mg/dL      Creatinine 1.03 mg/dL      Glucose 113 mg/dL      Calcium 9.3 mg/dL      AST 20 U/L      ALT 15 U/L      Alkaline Phosphatase 50 U/L      Total Protein 7.6 g/dL      Albumin 3.8 g/dL      Total Bilirubin 0.41 mg/dL      eGFR 55 ml/min/1.73sq m     Narrative:      National Kid[employer] Foundation guidelines for Chronic Kidney Disease (CKD):     Stage 1 with normal or high GFR (GFR > 90 mL/min/1.73 square meters)    Stage 2 Mild CKD (GFR = 60-89 mL/min/1.73 square meters)    Stage 3A  Moderate CKD (GFR = 45-59 mL/min/1.73 square meters)    Stage 3B Moderate CKD (GFR = 30-44 mL/min/1.73 square meters)    Stage 4 Severe CKD (GFR = 15-29 mL/min/1.73 square meters)    Stage 5 End Stage CKD (GFR <15 mL/min/1.73 square meters)  Note: GFR calculation is accurate only with a steady state creatinine    Protime-INR [[id number]]  (Normal) Collected: 11/23/24 2132    Lab Status: Final result Specimen: Blood from Arm, Right Updated: 11/23/24 2150     Protime 14.0 seconds      INR 1.05    Narrative:      INR Therapeutic Range    Indication                                             INR Range      Atrial Fibrillation                                               2.0-3.0  Hypercoagulable State                                    2.0.2.3  Left Ventricular Asist Device                            2.0-3.0  Mechanical Heart Valve                                  -    Aortic(with afib, MI, embolism, HF, LA enlargement,    and/or coagulopathy)                                     2.0-3.0 (2.5-3.5)     Mitral                                                             2.5-3.5  Prosthetic/Bioprosthetic Heart Valve               2.0-3.0  Venous thromboembolism (VTE: VT, PE        2.0-3.0    APTT [[id number]]  (Normal) Collected: 11/23/24 2132    Lab Status: Final result Specimen: Blood from Arm, Right Updated: 11/23/24 2150     PTT 32 seconds     CBC and differential [[id number]]  (Abnormal) Collected: 11/23/24 2132    Lab Status: Final result Specimen: Blood from Hand, Right Updated: 11/23/24 2140     WBC 4.86 Thousand/uL      RBC 4.34 Million/uL      Hemoglobin 11.8 g/dL      Hematocrit 36.6 %      MCV 84 fL      MCH 27.2 pg      MCHC 32.2 g/dL      RDW 18.3 %      MPV 10.4 fL      Platelets 184 Thousands/uL      nRBC 0 /100 WBCs      Segmented % 60 %      Immature Grans % 0 %      Lymphocytes % 29 %      Monocytes % 7 %      Eosinophils Relative 3 %      Basophils Relative 1 %      Absolute Neutrophils 2.92 Thousands/µL       Absolute Immature Grans 0.02 Thousand/uL      Absolute Lymphocytes 1.41 Thousands/µL      Absolute Monocytes 0.32 Thousand/µL      Eosinophils Absolute 0.15 Thousand/µL      Basophils Absolute 0.04 Thousands/µL             No orders to display       Procedures    ED Medication and Procedure Management   Prior to Admission Medications   Prescriptions Last Dose Informant Patient Reported? Taking?   Ascorbic Acid (vitamin C) 100 MG tablet  Self No No   Sig: Take 1 tablet (100 mg total) by mouth daily   Ascorbic Acid (vitamin C) 1000 MG tablet   No No   Sig: Take 1 tablet (1,000 mg total) by mouth daily   Fluticasone-Salmeterol (Advair) 100-50 mcg/dose inhaler   No No   Sig: Inhale 1 puff 2 (two) times a day Rinse mouth after use.   Patient not taking: Reported on 12/26/2023   albuterol (PROVENTIL HFA,VENTOLIN HFA) 90 mcg/act inhaler   No No   Sig: Inhale 1 puff every 6 (six) hours   aspirin (ECOTRIN) 325 mg EC tablet  Self No No   Sig: Take 1 tablet (325 mg total) by mouth daily   benzonatate (TESSALON PERLES) 100 mg capsule  Self No No   Sig: Take 1 capsule (100 mg total) by mouth 3 (three) times a day as needed for cough   cholecalciferol (VITAMIN D3) 1,000 units tablet  Self No No   Sig: TAKE 4 TABLETS BY MOUTH EVERY DAY   clindamycin (CLEOCIN T) 1 % lotion  Self No No   Sig: Apply topically 2 (two) times a day   Patient not taking: Reported on 3/25/2024   ferrous sulfate 324 (65 Fe) mg  Self No No   Sig: TAKE 1 TABLET(324 MG) BY MOUTH DAILY BEFORE BREAKFAST   lisinopril (ZESTRIL) 5 mg tablet  Self No No   Sig: TAKE 1 TABLET(5 MG) BY MOUTH DAILY   oxybutynin (DITROPAN-XL) 10 MG 24 hr tablet   No No   Sig: TAKE 1 TABLET(10 MG) BY MOUTH DAILY   pantoprazole (PROTONIX) 20 mg tablet  Self No No   Sig: TAKE 1 TABLET(20 MG) BY MOUTH DAILY BEFORE BREAKFAST   pravastatin (PRAVACHOL) 10 mg tablet   No No   Sig: TAKE 1 TABLET(10 MG) BY MOUTH DAILY AT BEDTIME   zolpidem (AMBIEN) 5 mg tablet   No No   Sig: Take 1 tablet (5  mg total) by mouth as needed for sleep (for use during sleep study) for up to 2 days      Facility-Administered Medications: None     Discharge Medication List as of 11/23/2024 10:50 PM        CONTINUE these medications which have NOT CHANGED    Details   albuterol (PROVENTIL HFA,VENTOLIN HFA) 90 mcg/act inhaler Inhale 1 puff every 6 (six) hours, Starting Thu 11/7/2024, Normal      !! Ascorbic Acid (vitamin C) 100 MG tablet Take 1 tablet (100 mg total) by mouth daily, Starting Tue 12/26/2023, Normal      !! Ascorbic Acid (vitamin C) 1000 MG tablet Take 1 tablet (1,000 mg total) by mouth daily, Starting Tue 10/22/2024, Normal      aspirin (ECOTRIN) 325 mg EC tablet Take 1 tablet (325 mg total) by mouth daily, Starting Fri 10/16/2020, Normal      benzonatate (TESSALON PERLES) 100 mg capsule Take 1 capsule (100 mg total) by mouth 3 (three) times a day as needed for cough, Starting Mon 6/17/2024, Normal      cholecalciferol (VITAMIN D3) 1,000 units tablet TAKE 4 TABLETS BY MOUTH EVERY DAY, Starting Mon 6/10/2024, Normal      clindamycin (CLEOCIN T) 1 % lotion Apply topically 2 (two) times a day, Starting Mon 3/25/2024, Normal      ferrous sulfate 324 (65 Fe) mg TAKE 1 TABLET(324 MG) BY MOUTH DAILY BEFORE BREAKFAST, Starting Thu 8/8/2024, Normal      Fluticasone-Salmeterol (Advair) 100-50 mcg/dose inhaler Inhale 1 puff 2 (two) times a day Rinse mouth after use., Starting Tue 12/5/2023, Until Sun 6/2/2024, Normal      lisinopril (ZESTRIL) 5 mg tablet TAKE 1 TABLET(5 MG) BY MOUTH DAILY, Starting Sat 4/27/2024, Normal      oxybutynin (DITROPAN-XL) 10 MG 24 hr tablet TAKE 1 TABLET(10 MG) BY MOUTH DAILY, Starting Thu 9/19/2024, Normal      pantoprazole (PROTONIX) 20 mg tablet TAKE 1 TABLET(20 MG) BY MOUTH DAILY BEFORE BREAKFAST, Normal      pravastatin (PRAVACHOL) 10 mg tablet TAKE 1 TABLET(10 MG) BY MOUTH DAILY AT BEDTIME, Normal      zolpidem (AMBIEN) 5 mg tablet Take 1 tablet (5 mg total) by mouth as needed for sleep (for  use during sleep study) for up to 2 days, Starting Mon 3/25/2024, Until Wed 3/27/2024 at 2359, Normal       !! - Potential duplicate medications found. Please discuss with provider.        No discharge procedures on file.  ED SEPSIS DOCUMENTATION   Time reflects when diagnosis was documented in both MDM as applicable and the Disposition within this note       Time User Action Codes Description Comment    11/23/2024 10:49 PM Dionne Candelario Add [K62.5] Rectal bleeding     11/23/2024 10:49 PM Dionne Candelario Remove [K62.5] Rectal bleeding     11/23/2024 10:49 PM Dionne Candelario Add [K92.1] Hematochezia                  Dionne Candelario PA-C  11/24/24 0104

## 2024-11-24 NOTE — TELEPHONE ENCOUNTER
"Reason for Disposition   Patient sounds very sick or weak to the triager    Answer Assessment - Initial Assessment Questions  1. SYMPTOM:  \"What's the main symptom you're concerned about?\" (e.g., pain, itching, swelling, rash)      Rectal bleeding   2. ONSET: \"When did the bleeding  start?\"      2 hours ago  3. RECTAL PAIN: \"Do you have any pain around your rectum?\" \"How bad is the pain?\"  (Scale 0-10; or none, mild, moderate, severe)      No pain in rectum - stomach ache/bloating   4. RECTAL ITCHING: \"Do you have any itching in this area?\" \"How bad is the itching?\"  (Scale 0-10; or none, mild, moderate, severe)      none  5. CONSTIPATION: \"Do you have constipation?\" If Yes, ask: \"How often do you have a bowel movement (BM)?\"  (Normal range: 3 times a day to every 3 days)  \"When was your last BM?\"        Patient states she may have been constipation a couple days ago but not today   6. CAUSE: \"What do you think is causing the anus symptoms?\"      Unknown   7. OTHER SYMPTOMS: \"Do you have any other symptoms?\"  (e.g., abdomen pain, fever, rectal bleeding, vomiting)      Abdominal pain and bloating, diarrhea   8. PREGNANCY: \"Is there any chance you are pregnant?\" \"When was your last menstrual period?\"      NA    Protocols used: Rectal Symptoms-Adult-AH    "

## 2024-11-25 ENCOUNTER — TELEPHONE (OUTPATIENT)
Dept: NEPHROLOGY | Facility: CLINIC | Age: 69
End: 2024-11-25

## 2024-11-25 ENCOUNTER — OFFICE VISIT (OUTPATIENT)
Dept: HEMATOLOGY ONCOLOGY | Facility: CLINIC | Age: 69
End: 2024-11-25
Payer: COMMERCIAL

## 2024-11-25 VITALS
DIASTOLIC BLOOD PRESSURE: 76 MMHG | TEMPERATURE: 97.2 F | OXYGEN SATURATION: 97 % | SYSTOLIC BLOOD PRESSURE: 130 MMHG | BODY MASS INDEX: 44.9 KG/M2 | HEIGHT: 55 IN | HEART RATE: 76 BPM | WEIGHT: 194 LBS | RESPIRATION RATE: 16 BRPM

## 2024-11-25 DIAGNOSIS — K27.4 GASTROINTESTINAL HEMORRHAGE ASSOCIATED WITH PEPTIC ULCER: ICD-10-CM

## 2024-11-25 DIAGNOSIS — D50.8 OTHER IRON DEFICIENCY ANEMIA: ICD-10-CM

## 2024-11-25 DIAGNOSIS — D53.9 NUTRITIONAL ANEMIA, UNSPECIFIED: ICD-10-CM

## 2024-11-25 DIAGNOSIS — E61.1 HYPOFERREMIA: Primary | ICD-10-CM

## 2024-11-25 DIAGNOSIS — K55.20 AVM (ARTERIOVENOUS MALFORMATION) OF SMALL BOWEL, ACQUIRED: ICD-10-CM

## 2024-11-25 PROCEDURE — G2211 COMPLEX E/M VISIT ADD ON: HCPCS | Performed by: PHYSICIAN ASSISTANT

## 2024-11-25 PROCEDURE — 99215 OFFICE O/P EST HI 40 MIN: CPT | Performed by: PHYSICIAN ASSISTANT

## 2024-11-25 RX ORDER — CYANOCOBALAMIN 1000 UG/ML
1000 INJECTION, SOLUTION INTRAMUSCULAR; SUBCUTANEOUS ONCE
Start: 2025-01-21 | End: 2025-01-21

## 2024-11-25 RX ORDER — SODIUM CHLORIDE 9 MG/ML
20 INJECTION, SOLUTION INTRAVENOUS ONCE
OUTPATIENT
Start: 2024-12-09

## 2024-11-25 RX ORDER — SODIUM CHLORIDE 9 MG/ML
20 INJECTION, SOLUTION INTRAVENOUS ONCE
OUTPATIENT
Start: 2025-01-21

## 2024-11-25 RX ORDER — CYANOCOBALAMIN 1000 UG/ML
1000 INJECTION, SOLUTION INTRAMUSCULAR; SUBCUTANEOUS ONCE
Start: 2024-12-09 | End: 2024-12-09

## 2024-11-25 NOTE — PATIENT INSTRUCTIONS
[facility]  [place] - [phone]    Your Team Member:  Advanced Practitioner:  Kavya Alcocer PA-C    Please answer Private and Unavailable Calls - this may be your team(s) contacting you.  If you have medical questions/concerns/issues - contact us either by (1) My Chart (2) [place]

## 2024-11-25 NOTE — TELEPHONE ENCOUNTER
Left voicemail for the patient reminding to please complete labwork prior to 12/3 appointment with Dr. Mayer. Advised patient to call back with any questions or concerns.

## 2024-11-25 NOTE — PROGRESS NOTES
[address]  [facility] HEMATOLOGY ONCOLOGY SPECIALISTS [place]  [address] [place] [address]  Hematology Ambulatory Follow-Up  Diana Conner, 1955, [id number]  11/25/2024      Assessment and Plan   1. Hypoferremia (Primary); 2. Nutritional anemia, unspecified; 3. AVM (arteriovenous malformation) of small bowel, acquired; 4. Gastrointestinal hemorrhage associated with peptic ulcer;   Etiology: Chronic blood loss anemia from AVM of the small bowel as well as jejunal ulcers likely related to peptic ulcer disease.    Patient underwent previous IV iron supplementation after being profoundly anemic.  Patient did feel better.  Patient now notes that she has had 2 episodes of bright red blood per rectum following constipation.  Patient also had a capsule endoscopy which found the etiology above.  In general patient is feeling okay today.  Some fatigue but not as fatigued as she was back in August with previous treatment.  Patient tolerated heme without toxicities.  Recommendation for below regimen.    Regimen:  Feraheme 510 mg IV weekly x 2  Followed by   Feraheme 510 mg IV q 6 weeks x 2 doses    B 12 to be given with each Iron treatment.     - CBC and differential; Future  - Iron Panel (Includes Ferritin, Iron Sat%, Iron, and TIBC); Future  - Vitamin B12; Future  - Folate; Future    The patient is scheduled for follow-up in approximately 5 months/ 20 months.     Patient voiced agreement and understanding to the above.   Patient advised to call the Hematology/Oncology office with any questions and concerns regarding the above.    I have spent a total time of 35 minutes in caring for this patient on the day of the visit/encounter including Prognosis, Risks and benefits of tx options, Instructions for management, Documenting in the medical record, and Obtaining or reviewing history  .  Barrier(s) to care: None  The patient is  able to self care.    Kavya Alcocer PA-C  Medical  Oncology/Hematology  [facility]    Subjective     Chief Complaint   Patient presents with    Follow-up       History of present illness:   This is a 69-year-old female with past medical history of GERD, gout, hypertension, stage III kidney disease, asthma and more recently iron deficiency anemia discovered in December 2023 who presents to the hematology clinic at the request of the PCP secondary to refractory iron deficiency anemia on oral iron x 8 months.     Historical CBCs:  4/20/2018 WBC 5.9, hemoglobin 14.7, platelet count 171  2/13/2021 WBC 8.1, hemoglobin 14.6, platelets 164  2/6/2023 WBC 4.9, hemoglobin 13.6, MCV 91, RDW 13.9, platelet count 180     In December 2023 patient presented to the emergency room via EMS secondary to weakness and bright red blood per rectum.  Hemoglobin of 5.8 found on presentation, patient was transfused with 2 units of packed red blood cells.              12/12/2023 ferritin = 2, iron saturation 1%       12/13/2023 colonoscopy demonstrated hemorrhoids and diverticulosis  endoscopy stomach and duodenum appeared normal-esophagus demonstrated goblet cells with minimal intestinal metaplasia.  Discharge blood work demonstrated a hemoglobin of 8.2 g/dL with an MCV of 66 RDW of 22.5              -Discharged home on oral supplements     7/9/2024 WBC 6.9, hemoglobin 8.9, MCV 71, RDW 18.7, platelet count 246              ON 18, creatinine 1.02, EGFR 57              Iron saturation 3%, TIBC 88, ferritin 3    9/5 and 9/12/2024: Feraheme 510 mg IV weekly x 2 doses   9/29/2024 hemoglobin 13.1, MCV 79   10/1/2024: Capsule endoscopy demonstrated a jejunal AVM and distal jejunal ulcerations.  11/20/2024 WBC 4.8, hemoglobin 11.8, MCV 84, platelet count 184    Ferritin 9, TIBC 437      Interval history: Patient notes feeling tired.  Recent hospitalization over the weekend secondary to bright red blood in toilet.  Patient notes that she was constipated straining and then  ended up with hematochezia.  This spontaneously resolved patient was hemodynamically stable discharge from the hospital.    Review of Systems   Constitutional:  Positive for fatigue. Negative for appetite change, fever and unexpected weight change.   HENT:  Negative for nosebleeds.    Respiratory:  Negative for cough, choking and shortness of breath.         Negative hemoptysis.   Cardiovascular:  Negative for chest pain, palpitations and leg swelling.   Gastrointestinal: Negative.  Negative for abdominal distention, abdominal pain, anal bleeding, blood in stool, constipation, diarrhea, nausea and vomiting.   Endocrine: Negative.  Negative for cold intolerance.   Genitourinary: Negative.  Negative for hematuria, menstrual problem, vaginal bleeding, vaginal discharge and vaginal pain.   Musculoskeletal: Negative.  Negative for arthralgias, myalgias, neck pain and neck stiffness.   Skin: Negative.  Negative for color change, pallor and rash.   Allergic/Immunologic: Negative.  Negative for immunocompromised state.   Neurological: Negative.  Negative for weakness and headaches.   Hematological:  Negative for adenopathy. Does not bruise/bleed easily.   All other systems reviewed and are negative.      Patient Active Problem List   Diagnosis    Carpal tunnel syndrome    Dyspnea    Hyperlipidemia    Lateral epicondylitis    Former smoker    Vitamin D deficiency    Stage 3a chronic kidney disease (HCC)    Atherosclerosis of arteries of extremities (HCC)    History of prediabetes    Primary hypertension    Morbid obesity with BMI of 45.0-49.9, adult (HCC)    Pancreatic cyst    Symptomatic anemia    Gastroesophageal reflux disease without esophagitis    Constipation    Mixed stress and urge urinary incontinence    Iron deficiency anemia, unspecified    Hypoferremia    AVM (arteriovenous malformation) of small bowel, acquired    Gastrointestinal hemorrhage associated with peptic ulcer     Past Medical History:   Diagnosis Date     Anemia 2023    Asthma     Gastroesophageal reflux disease without esophagitis 2023    Gout 2021    Hypertension 10/25/2023    Lateral epicondylitis 10/9/2015    Stage 3 chronic kidney disease, unspecified whether stage 3a or 3b CKD (HCC) 2022     Past Surgical History:   Procedure Laterality Date    IMPLANTATION VAGAL NERVE STIMULATOR       Family History   Problem Relation Age of Onset    No Known Problems Mother     Kidney disease Father     No Known Problems Sister     No Known Problems Sister     No Known Problems Sister     Cancer Paternal Uncle     No Known Problems Maternal Grandmother     No Known Problems Maternal Grandfather     No Known Problems Paternal Grandmother     No Known Problems Paternal Grandfather      Social History     Socioeconomic History    Marital status: Single     Spouse name: None    Number of children: None    Years of education: None    Highest education level: None   Occupational History    None   Tobacco Use    Smoking status: Former     Current packs/day: 0.00     Types: Cigarettes     Quit date:      Years since quittin.9    Smokeless tobacco: Former    Tobacco comments:     exposure to passive smoke   Vaping Use    Vaping status: Never Used   Substance and Sexual Activity    Alcohol use: Not Currently    Drug use: Never    Sexual activity: None   Other Topics Concern    None   Social History Narrative    None     Social Drivers of Health     Financial Resource Strain: Low Risk  (2023)    Overall Financial Resource Strain (CARDIA)     Difficulty of Paying Living Expenses: Not hard at all   Food Insecurity: No Food Insecurity (2024)    Nursing - Inadequate Food Risk Classification     Worried About Running Out of Food in the Last Year: Never true     Ran Out of Food in the Last Year: Never true     Ran Out of Food in the Last Year: Not on file   Transportation Needs: No Transportation Needs (2024)    PRAPARE - Transportation      Lack of Transportation (Medical): No     Lack of Transportation (Non-Medical): No   Physical Activity: Not on file   Stress: Not on file   Social Connections: Not on file   Intimate Partner Violence: Not on file   Housing Stability: Low Risk  (6/17/2024)    Housing Stability Vital Sign     Unable to Pay for Housing in the Last Year: No     Number of Times Moved in the Last Year: 1     Homeless in the Last Year: No       Current Outpatient Medications:     albuterol (PROVENTIL HFA,VENTOLIN HFA) 90 mcg/act inhaler, Inhale 1 puff every 6 (six) hours, Disp: 18 g, Rfl: 0    Ascorbic Acid (vitamin C) 100 MG tablet, Take 1 tablet (100 mg total) by mouth daily, Disp: 90 tablet, Rfl: 1    Ascorbic Acid (vitamin C) 1000 MG tablet, Take 1 tablet (1,000 mg total) by mouth daily, Disp: 90 tablet, Rfl: 1    aspirin (ECOTRIN) 325 mg EC tablet, Take 1 tablet (325 mg total) by mouth daily, Disp: 30 tablet, Rfl: 2    benzonatate (TESSALON PERLES) 100 mg capsule, Take 1 capsule (100 mg total) by mouth 3 (three) times a day as needed for cough, Disp: 20 capsule, Rfl: 0    cholecalciferol (VITAMIN D3) 1,000 units tablet, TAKE 4 TABLETS BY MOUTH EVERY DAY, Disp: 360 tablet, Rfl: 1    ferrous sulfate 324 (65 Fe) mg, TAKE 1 TABLET(324 MG) BY MOUTH DAILY BEFORE BREAKFAST, Disp: 60 tablet, Rfl: 3    lisinopril (ZESTRIL) 5 mg tablet, TAKE 1 TABLET(5 MG) BY MOUTH DAILY, Disp: 90 tablet, Rfl: 0    oxybutynin (DITROPAN-XL) 10 MG 24 hr tablet, TAKE 1 TABLET(10 MG) BY MOUTH DAILY, Disp: 90 tablet, Rfl: 1    pantoprazole (PROTONIX) 20 mg tablet, TAKE 1 TABLET(20 MG) BY MOUTH DAILY BEFORE BREAKFAST, Disp: 90 tablet, Rfl: 1    pravastatin (PRAVACHOL) 10 mg tablet, TAKE 1 TABLET(10 MG) BY MOUTH DAILY AT BEDTIME, Disp: 90 tablet, Rfl: 0    clindamycin (CLEOCIN T) 1 % lotion, Apply topically 2 (two) times a day (Patient not taking: Reported on 3/25/2024), Disp: 60 mL, Rfl: 0    Fluticasone-Salmeterol (Advair) 100-50 mcg/dose inhaler, Inhale 1 puff 2  "(two) times a day Rinse mouth after use. (Patient not taking: Reported on 12/26/2023), Disp: 60 blister, Rfl: 5    zolpidem (AMBIEN) 5 mg tablet, Take 1 tablet (5 mg total) by mouth as needed for sleep (for use during sleep study) for up to 2 days, Disp: 2 tablet, Rfl: 0  Allergies   Allergen Reactions    Lansoprazole Palpitations     Erythema/ tachycardia  Other reaction(s): tachycardia    Penicillins Itching and Rash     Other reaction(s): Rash  Other reaction(s): Rash       Objective   /76 (Patient Position: Sitting, Cuff Size: Adult)   Pulse 76   Temp (!) 97.2 °F (36.2 °C) (Temporal)   Resp 16   Ht 4' 7\" (1.397 m)   Wt 88 kg (194 lb)   SpO2 97%   BMI 45.09 kg/m²    Physical Exam  Constitutional:       General: She is not in acute distress.     Appearance: She is well-developed.   HENT:      Head: Normocephalic and atraumatic.   Eyes:      General: No scleral icterus.     Conjunctiva/sclera: Conjunctivae normal.   Cardiovascular:      Rate and Rhythm: Normal rate.   Pulmonary:      Effort: Pulmonary effort is normal. No respiratory distress.   Skin:     General: Skin is warm.      Coloration: Skin is not pale.      Findings: No rash.   Neurological:      Mental Status: She is alert and oriented to person, place, and time.   Psychiatric:         Thought Content: Thought content normal.         Result Review  Labs:  Admission on 11/23/2024, Discharged on 11/23/2024   Component Date Value Ref Range Status    WBC 11/23/2024 4.86  4.31 - 10.16 Thousand/uL Final    RBC 11/23/2024 4.34  3.81 - 5.12 Million/uL Final    Hemoglobin 11/23/2024 11.8  11.5 - 15.4 g/dL Final    Hematocrit 11/23/2024 36.6  34.8 - 46.1 % Final    MCV 11/23/2024 84  82 - 98 fL Final    MCH 11/23/2024 27.2  26.8 - 34.3 pg Final    MCHC 11/23/2024 32.2  31.4 - 37.4 g/dL Final    RDW 11/23/2024 18.3 (H)  11.6 - 15.1 % Final    MPV 11/23/2024 10.4  8.9 - 12.7 fL Final    Platelets 11/23/2024 184  149 - 390 Thousands/uL Final    nRBC " 11/23/2024 0  /100 WBCs Final    Segmented % 11/23/2024 60  43 - 75 % Final    Immature Grans % 11/23/2024 0  0 - 2 % Final    Lymphocytes % 11/23/2024 29  14 - 44 % Final    Monocytes % 11/23/2024 7  4 - 12 % Final    Eosinophils Relative 11/23/2024 3  0 - 6 % Final    Basophils Relative 11/23/2024 1  0 - 1 % Final    Absolute Neutrophils 11/23/2024 2.92  1.85 - 7.62 Thousands/µL Final    Absolute Immature Grans 11/23/2024 0.02  0.00 - 0.20 Thousand/uL Final    Absolute Lymphocytes 11/23/2024 1.41  0.60 - 4.47 Thousands/µL Final    Absolute Monocytes 11/23/2024 0.32  0.17 - 1.22 Thousand/µL Final    Eosinophils Absolute 11/23/2024 0.15  0.00 - 0.61 Thousand/µL Final    Basophils Absolute 11/23/2024 0.04  0.00 - 0.10 Thousands/µL Final    Sodium 11/23/2024 144  135 - 147 mmol/L Final    Potassium 11/23/2024 3.6  3.5 - 5.3 mmol/L Final    Chloride 11/23/2024 111 (H)  96 - 108 mmol/L Final    CO2 11/23/2024 22  21 - 32 mmol/L Final    ANION GAP 11/23/2024 11  4 - 13 mmol/L Final    BUN 11/23/2024 15  5 - 25 mg/dL Final    Creatinine 11/23/2024 1.03  0.60 - 1.30 mg/dL Final    Standardized to IDMS reference method    Glucose 11/23/2024 113  65 - 140 mg/dL Final    If the patient is fasting, the ADA then defines impaired fasting glucose as > 100 mg/dL and diabetes as > or equal to 123 mg/dL.    Calcium 11/23/2024 9.3  8.4 - 10.2 mg/dL Final    AST 11/23/2024 20  13 - 39 U/L Final    ALT 11/23/2024 15  7 - 52 U/L Final    Specimen collection should occur prior to Sulfasalazine administration due to the potential for falsely depressed results.     Alkaline Phosphatase 11/23/2024 50  34 - 104 U/L Final    Total Protein 11/23/2024 7.6  6.4 - 8.4 g/dL Final    Albumin 11/23/2024 3.8  3.5 - 5.0 g/dL Final    Total Bilirubin 11/23/2024 0.41  0.20 - 1.00 mg/dL Final    Use of this assay is not recommended for patients undergoing treatment with eltrombopag due to the potential for falsely elevated  results.  N-acetyl-p-benzoquinone imine (metabolite of Acetaminophen) will generate erroneously low results in samples for patients that have taken an overdose of Acetaminophen.    eGFR 11/23/2024 55  ml/min/1.73sq m Final    ABO Grouping 11/23/2024 A   Final    Rh Factor 11/23/2024 Positive   Final    Antibody Screen 11/23/2024 Negative   Final    Specimen Expiration Date 11/23/2024 20241126   Final    Protime 11/23/2024 14.0  12.3 - 15.0 seconds Final    INR 11/23/2024 1.05  0.85 - 1.19 Final    PTT 11/23/2024 32  23 - 34 seconds Final    Therapeutic Heparin Range =  60-90 seconds   Appointment on 11/20/2024   Component Date Value Ref Range Status    WBC 11/20/2024 5.14  4.31 - 10.16 Thousand/uL Final    RBC 11/20/2024 4.50  3.81 - 5.12 Million/uL Final    Hemoglobin 11/20/2024 12.1  11.5 - 15.4 g/dL Final    Hematocrit 11/20/2024 37.5  34.8 - 46.1 % Final    MCV 11/20/2024 83  82 - 98 fL Final    MCH 11/20/2024 26.9  26.8 - 34.3 pg Final    MCHC 11/20/2024 32.3  31.4 - 37.4 g/dL Final    RDW 11/20/2024 18.8 (H)  11.6 - 15.1 % Final    MPV 11/20/2024 10.5  8.9 - 12.7 fL Final    Platelets 11/20/2024 164  149 - 390 Thousands/uL Final    nRBC 11/20/2024 0  /100 WBCs Final    Segmented % 11/20/2024 69  43 - 75 % Final    Immature Grans % 11/20/2024 0  0 - 2 % Final    Lymphocytes % 11/20/2024 20  14 - 44 % Final    Monocytes % 11/20/2024 6  4 - 12 % Final    Eosinophils Relative 11/20/2024 4  0 - 6 % Final    Basophils Relative 11/20/2024 1  0 - 1 % Final    Absolute Neutrophils 11/20/2024 3.56  1.85 - 7.62 Thousands/µL Final    Absolute Immature Grans 11/20/2024 0.02  0.00 - 0.20 Thousand/uL Final    Absolute Lymphocytes 11/20/2024 1.02  0.60 - 4.47 Thousands/µL Final    Absolute Monocytes 11/20/2024 0.29  0.17 - 1.22 Thousand/µL Final    Eosinophils Absolute 11/20/2024 0.21  0.00 - 0.61 Thousand/µL Final    Basophils Absolute 11/20/2024 0.04  0.00 - 0.10 Thousands/µL Final    Vitamin B-12 11/20/2024 810 608 - 038  pg/mL Final    Folate 11/20/2024 >22.3  >5.9 ng/mL Final    The World Health Organization has determined deficient folate concentrations are considered to be <4.0 ng/mL.    BUN 11/20/2024 19  5 - 25 mg/dL Final    Creatinine 11/20/2024 0.95  0.60 - 1.30 mg/dL Final    Standardized to IDMS reference method    eGFR 11/20/2024 61  ml/min/1.73sq m Final    Tissue Transglutaminase Ab IGA Int* 11/20/2024 Negative  Negative Final    Tissue Transglutaminase Ab IGA 11/20/2024 <0.5  <15.0 U/mL Final    IGA 11/20/2024 273  66 - 433 mg/dL Final    Iron Saturation 11/20/2024 8 (L)  15 - 50 % Final    TIBC 11/20/2024 437  250 - 450 ug/dL Final    Iron 11/20/2024 33 (L)  50 - 212 ug/dL Final    Patients treated with metal-binding drugs (ie. Deferoxamine) may have depressed iron values.    UIBC 11/20/2024 404 (H)  155 - 355 ug/dL Final    Ferritin 11/20/2024 9 (L)  11 - 307 ng/mL Final       Imaging:   Lab Results   Component Value Date    WBC 4.86 11/23/2024    HGB 11.8 11/23/2024    HCT 36.6 11/23/2024    MCV 84 11/23/2024     11/23/2024     Lab Results   Component Value Date     04/21/2018    SODIUM 144 11/23/2024    K 3.6 11/23/2024     (H) 11/23/2024    CO2 22 11/23/2024    ANIONGAP 11 04/21/2018    AGAP 11 11/23/2024    BUN 15 11/23/2024    CREATININE 1.03 11/23/2024    GLUC 113 11/23/2024    GLUF 106 (H) 07/09/2024    CALCIUM 9.3 11/23/2024    AST 20 11/23/2024    ALT 15 11/23/2024    ALKPHOS 50 11/23/2024    PROT 7.7 04/21/2018    TP 7.6 11/23/2024    BILITOT 0.6 04/21/2018    TBILI 0.41 11/23/2024    EGFR 55 11/23/2024       Please note:  This report has been generated by a voice recognition software system. Therefore there may be syntax, spelling, and/or grammatical errors. Please call if you have any questions.

## 2024-11-25 NOTE — TELEPHONE ENCOUNTER
Patient stated that only done blood test at ER(11/23/24) and she will do urine test(Albumin / creatinine urine ratio) on Wednesday. No further questions at this time.

## 2024-11-25 NOTE — TELEPHONE ENCOUNTER
Patient returned call and said she just had blood work done in the hospital. It seems like she has all the labs that were placed today already done on 11/20/24 .    Are those results from 11/20 acceptable?    Patient would like a call back asap with an update.

## 2024-11-27 ENCOUNTER — HOSPITAL ENCOUNTER (OUTPATIENT)
Dept: MAMMOGRAPHY | Facility: CLINIC | Age: 69
Discharge: HOME | End: 2024-11-27
Payer: COMMERCIAL

## 2024-11-27 ENCOUNTER — APPOINTMENT (OUTPATIENT)
Dept: LAB | Facility: HOSPITAL | Age: 69
End: 2024-11-27
Payer: COMMERCIAL

## 2024-11-27 VITALS — BODY MASS INDEX: 44.9 KG/M2 | WEIGHT: 194 LBS | HEIGHT: 55 IN

## 2024-11-27 DIAGNOSIS — E78.2 MIXED HYPERLIPIDEMIA: ICD-10-CM

## 2024-11-27 DIAGNOSIS — I10 PRIMARY HYPERTENSION: Chronic | ICD-10-CM

## 2024-11-27 DIAGNOSIS — N18.31 STAGE 3A CHRONIC KIDNEY DISEASE (HCC): Chronic | ICD-10-CM

## 2024-11-27 DIAGNOSIS — Z12.31 ENCOUNTER FOR SCREENING MAMMOGRAM FOR BREAST CANCER: ICD-10-CM

## 2024-11-27 DIAGNOSIS — N18.30 STAGE 3 CHRONIC KIDNEY DISEASE, UNSPECIFIED WHETHER STAGE 3A OR 3B CKD (HCC): ICD-10-CM

## 2024-11-27 LAB
ANION GAP SERPL CALCULATED.3IONS-SCNC: 11 MMOL/L (ref 4–13)
BUN SERPL-MCNC: 15 MG/DL (ref 5–25)
CALCIUM SERPL-MCNC: 10.1 MG/DL (ref 8.4–10.2)
CHLORIDE SERPL-SCNC: 105 MMOL/L (ref 96–108)
CO2 SERPL-SCNC: 27 MMOL/L (ref 21–32)
CREAT SERPL-MCNC: 0.92 MG/DL (ref 0.6–1.3)
CREAT UR-MCNC: 176.3 MG/DL
GFR SERPL CREATININE-BSD FRML MDRD: 64 ML/MIN/1.73SQ M
GLUCOSE P FAST SERPL-MCNC: 85 MG/DL (ref 65–99)
MICROALBUMIN UR-MCNC: 13 MG/L
MICROALBUMIN/CREAT 24H UR: 7 MG/G CREATININE (ref 0–30)
POTASSIUM SERPL-SCNC: 3.9 MMOL/L (ref 3.5–5.3)
SODIUM SERPL-SCNC: 143 MMOL/L (ref 135–147)

## 2024-11-27 PROCEDURE — 82570 ASSAY OF URINE CREATININE: CPT

## 2024-11-27 PROCEDURE — 80048 BASIC METABOLIC PNL TOTAL CA: CPT

## 2024-11-27 PROCEDURE — 77067 SCR MAMMO BI INCL CAD: CPT

## 2024-11-27 PROCEDURE — 82043 UR ALBUMIN QUANTITATIVE: CPT

## 2024-11-27 PROCEDURE — 36415 COLL VENOUS BLD VENIPUNCTURE: CPT

## 2024-11-27 PROCEDURE — 77063 BREAST TOMOSYNTHESIS BI: CPT

## 2024-12-03 ENCOUNTER — OFFICE VISIT (OUTPATIENT)
Dept: NEPHROLOGY | Facility: CLINIC | Age: 69
End: 2024-12-03
Payer: COMMERCIAL

## 2024-12-03 VITALS
DIASTOLIC BLOOD PRESSURE: 72 MMHG | BODY MASS INDEX: 44.9 KG/M2 | WEIGHT: 194 LBS | SYSTOLIC BLOOD PRESSURE: 124 MMHG | HEIGHT: 55 IN

## 2024-12-03 DIAGNOSIS — I10 PRIMARY HYPERTENSION: Chronic | ICD-10-CM

## 2024-12-03 DIAGNOSIS — N18.31 STAGE 3A CHRONIC KIDNEY DISEASE (HCC): Primary | Chronic | ICD-10-CM

## 2024-12-03 DIAGNOSIS — Z87.898 HISTORY OF PREDIABETES: ICD-10-CM

## 2024-12-03 DIAGNOSIS — E78.2 MIXED HYPERLIPIDEMIA: ICD-10-CM

## 2024-12-03 DIAGNOSIS — E66.01 MORBID OBESITY WITH BMI OF 45.0-49.9, ADULT (HCC): Chronic | ICD-10-CM

## 2024-12-03 PROCEDURE — 99214 OFFICE O/P EST MOD 30 MIN: CPT | Performed by: INTERNAL MEDICINE

## 2024-12-03 NOTE — PATIENT INSTRUCTIONS
As we discussed in the office visit today based on the most recent blood test your kidney function remains stable.  At this moment we discussed about importance to keep working on losing weight, follow low-salt diet and avoid NSAIDs as much as possible (no ibuprofen, Motrin, Advil, Aleve, naproxen).  Okay to take Tylenol or acetaminophen as needed for pain.  Advised to continue with close follow-up with your primary care doctor and she should be checking regular blood and urine test at her own discretion.  I will be more than happy to see you back in the office in the future if needed.

## 2024-12-03 NOTE — PROGRESS NOTES
OFFICE FOLLOW UP - Nephrology   Diana Conner 69 y.o. female MRN: [id number]       ASSESSMENT and PLAN:  Diana was seen today for follow-up and chronic kidney disease.    Diagnoses and all orders for this visit:    Stage 3a chronic kidney disease (HCC)    Primary hypertension    History of prediabetes    Morbid obesity with BMI of 45.0-49.9, adult (HCC)    Mixed hyperlipidemia        This is a 69-year-old lady who was initially seen on 11/2023 after referred by her primary care doctor for evaluation of chronic kidney disease, also patient with history of morbid obesity, hypertension, hyperlipidemia, today she returns to the office for year follow-up    1 chronic kidney disease stage II/IIIa with previous creatinine fluctuating around 0.9-1.1 since 2020.  Most recent blood and urine test with patient, creatinine 0.92 with an estimated GFR of 64 and no microalbuminuria.  Blood pressure today well-controlled.  We discussed with patient about importance to keep working on losing weight, follow low-salt diet, avoid NSAIDs and stay well-hydrated.  Given that her kidney function remains stable, plan for her to continue follow-up with her primary care doctor with labs regularly and I will be more than happy to see her in the future if needed    2 hypertension, no pressure today well-controlled, she is on low-dose ACE inhibitor.  Advised to follow low-salt diet and keep working on losing weight    #3 morbid obesity, as before advised to lose weight    #4 hyperlipidemia, on statins per primary care doctor      Patient Instructions   As we discussed in the office visit today based on the most recent blood test your kidney function remains stable.  At this moment we discussed about importance to keep working on losing weight, follow low-salt diet and avoid NSAIDs as much as possible (no ibuprofen, Motrin, Advil, Aleve, naproxen).  Okay to take Tylenol or acetaminophen as needed for pain.  Advised to continue with close  follow-up with your primary care doctor and she should be checking regular blood and urine test at her own discretion.  I will be more than happy to see you back in the office in the future if needed.          HPI: Diana Conner is a 69 y.o. female who is here for Follow-up and Chronic Kidney Disease  .    This is a patient who was initially seen on 11/2023 after referred by her primary care doctor for evaluation of CKD.  Patient today returns to the office for year follow-up    She went to the emergency department last week due to hematochezia that as per patient is resolved  Patient currently has no complaints at this time and is feeling well.   Patient denies any chest pain, shortness of breath and swelling.   Denies any abdominal pain, no nausea, vomiting, no constipation.  Reports some on and off chronic diarrhea  Denies any urinary problems, no burning sensation or gross hematuria.  Denies NSAID use    The last blood work was done on 11/27/2024, which we have reviewed together.  Most recent creatinine 0.92 with an estimated GFR of 64    ROS:   All the systems were reviewed and were negative except as documented on the HPI.    Allergies: Lansoprazole and Penicillins    Medications:   Current Outpatient Medications:     albuterol (PROVENTIL HFA,VENTOLIN HFA) 90 mcg/act inhaler, Inhale 1 puff every 6 (six) hours, Disp: 18 g, Rfl: 0    Ascorbic Acid (vitamin C) 1000 MG tablet, Take 1 tablet (1,000 mg total) by mouth daily, Disp: 90 tablet, Rfl: 1    aspirin (ECOTRIN) 325 mg EC tablet, Take 1 tablet (325 mg total) by mouth daily, Disp: 30 tablet, Rfl: 2    benzonatate (TESSALON PERLES) 100 mg capsule, Take 1 capsule (100 mg total) by mouth 3 (three) times a day as needed for cough, Disp: 20 capsule, Rfl: 0    cholecalciferol (VITAMIN D3) 1,000 units tablet, TAKE 4 TABLETS BY MOUTH EVERY DAY, Disp: 360 tablet, Rfl: 1    lisinopril (ZESTRIL) 5 mg tablet, TAKE 1 TABLET(5 MG) BY MOUTH DAILY, Disp: 90 tablet, Rfl: 0     oxybutynin (DITROPAN-XL) 10 MG 24 hr tablet, TAKE 1 TABLET(10 MG) BY MOUTH DAILY, Disp: 90 tablet, Rfl: 1    pantoprazole (PROTONIX) 20 mg tablet, TAKE 1 TABLET(20 MG) BY MOUTH DAILY BEFORE BREAKFAST, Disp: 90 tablet, Rfl: 1    pravastatin (PRAVACHOL) 10 mg tablet, TAKE 1 TABLET(10 MG) BY MOUTH DAILY AT BEDTIME, Disp: 90 tablet, Rfl: 0    zolpidem (AMBIEN) 5 mg tablet, Take 1 tablet (5 mg total) by mouth as needed for sleep (for use during sleep study) for up to 2 days, Disp: 2 tablet, Rfl: 0    Ascorbic Acid (vitamin C) 100 MG tablet, Take 1 tablet (100 mg total) by mouth daily (Patient not taking: Reported on 12/3/2024), Disp: 90 tablet, Rfl: 1    clindamycin (CLEOCIN T) 1 % lotion, Apply topically 2 (two) times a day (Patient not taking: Reported on 3/25/2024), Disp: 60 mL, Rfl: 0    ferrous sulfate 324 (65 Fe) mg, TAKE 1 TABLET(324 MG) BY MOUTH DAILY BEFORE BREAKFAST (Patient not taking: Reported on 12/3/2024), Disp: 60 tablet, Rfl: 3    Fluticasone-Salmeterol (Advair) 100-50 mcg/dose inhaler, Inhale 1 puff 2 (two) times a day Rinse mouth after use. (Patient not taking: Reported on 12/3/2024), Disp: 60 blister, Rfl: 5    Past Medical History:   Diagnosis Date    Anemia 12/12/2023    Asthma     Gastroesophageal reflux disease without esophagitis 12/26/2023    Gout 02/13/2021    Hypertension 10/25/2023    Lateral epicondylitis 10/9/2015    Stage 3 chronic kidney disease, unspecified whether stage 3a or 3b CKD (HCC) 1/13/2022     Past Surgical History:   Procedure Laterality Date    IMPLANTATION VAGAL NERVE STIMULATOR       Family History   Problem Relation Age of Onset    No Known Problems Mother     Kidney disease Father     No Known Problems Sister     No Known Problems Sister     No Known Problems Sister     No Known Problems Maternal Grandmother     No Known Problems Maternal Grandfather     No Known Problems Paternal Grandmother     No Known Problems Paternal Grandfather     Cancer Paternal Uncle     Breast  "cancer Neg Hx       reports that she quit smoking about 9 years ago. Her smoking use included cigarettes. She has quit using smokeless tobacco. She reports that she does not currently use alcohol. She reports that she does not use drugs.      Physical Exam:   Vitals:    12/03/24 1437   BP: 124/72   BP Location: Left arm   Patient Position: Sitting   Cuff Size: Standard   Weight: 88 kg (194 lb)   Height: 4' 7\" (1.397 m)     Body mass index is 45.09 kg/m².    General: Morbidly obese, cooperative, in not acute distress  Eyes: conjunctivae pink, anicteric sclerae  ENT: lips and mucous membranes moist  Neck: supple, no JVD  Chest: clear breath sounds bilateral, no crackles, ronchus or wheezings  CVS: distinct S1 & S2, normal rate, regular rhythm  Abdomen: soft, non-tender, non-distended, normoactive bowel sounds  Back: no CVA tenderness  Extremities: no edema of both legs  Skin: no rash  Neuro: awake, alert, oriented      Lab Results:  Results for orders placed or performed in visit on 11/27/24   Basic metabolic panel    Collection Time: 11/27/24 10:01 AM   Result Value Ref Range    Sodium 143 135 - 147 mmol/L    Potassium 3.9 3.5 - 5.3 mmol/L    Chloride 105 96 - 108 mmol/L    CO2 27 21 - 32 mmol/L    ANION GAP 11 4 - 13 mmol/L    BUN 15 5 - 25 mg/dL    Creatinine 0.92 0.60 - 1.30 mg/dL    Glucose, Fasting 85 65 - 99 mg/dL    Calcium 10.1 8.4 - 10.2 mg/dL    eGFR 64 ml/min/1.73sq m   Albumin / creatinine urine ratio    Collection Time: 11/27/24 10:01 AM   Result Value Ref Range    Creatinine, Ur 176.3 Reference range not established. mg/dL    Albumin,U,Random 13.0 <20.0 mg/L    Albumin Creat Ratio 7 0 - 30 mg/g creatinine       Results from last 7 days   Lab Units 11/27/24  1001   SODIUM mmol/L 143   POTASSIUM mmol/L 3.9   CHLORIDE mmol/L 105   CO2 mmol/L 27   BUN mg/dL 15   CREATININE mg/dL 0.92   CALCIUM mg/dL 10.1           Portions of the record may have been created with voice recognition software. Occasional wrong " "word or \"sound a like\" substitutions may have occurred due to the inherent limitations of voice recognition software. Read the chart carefully and recognize, using context, where substitutions have occurred.If you have any questions, please contact the dictating provider.  "

## 2024-12-04 ENCOUNTER — TELEPHONE (OUTPATIENT)
Dept: HEMATOLOGY ONCOLOGY | Facility: CLINIC | Age: 69
End: 2024-12-04

## 2024-12-05 DIAGNOSIS — K21.9 GASTROESOPHAGEAL REFLUX DISEASE WITHOUT ESOPHAGITIS: ICD-10-CM

## 2024-12-05 DIAGNOSIS — I10 PRIMARY HYPERTENSION: ICD-10-CM

## 2024-12-05 RX ORDER — PANTOPRAZOLE SODIUM 20 MG/1
TABLET, DELAYED RELEASE ORAL
Qty: 90 TABLET | Refills: 0 | Status: SHIPPED | OUTPATIENT
Start: 2024-12-05

## 2024-12-05 RX ORDER — LISINOPRIL 5 MG/1
5 TABLET ORAL DAILY
Qty: 90 TABLET | Refills: 0 | Status: SHIPPED | OUTPATIENT
Start: 2024-12-05

## 2024-12-07 ENCOUNTER — HOSPITAL ENCOUNTER (OUTPATIENT)
Dept: CT IMAGING | Facility: HOSPITAL | Age: 69
Discharge: HOME | End: 2024-12-07
Payer: COMMERCIAL

## 2024-12-07 DIAGNOSIS — K86.9 LESION OF PANCREAS: ICD-10-CM

## 2024-12-07 PROCEDURE — 74160 CT ABDOMEN W/CONTRAST: CPT

## 2024-12-07 RX ADMIN — IOHEXOL 100 ML: 350 INJECTION, SOLUTION INTRAVENOUS at 11:28

## 2024-12-12 ENCOUNTER — RESULTS FOLLOW-UP (OUTPATIENT)
Dept: GASTROENTEROLOGY | Facility: CLINIC | Age: 69
End: 2024-12-12

## 2024-12-12 DIAGNOSIS — K86.9 LESION OF PANCREAS: Primary | ICD-10-CM

## 2024-12-12 NOTE — TELEPHONE ENCOUNTER
Patient returned phone call. Results relayed to patient. Patient verbalized understanding and had no questions or concerns.

## 2024-12-15 DIAGNOSIS — E78.2 MIXED HYPERLIPIDEMIA: ICD-10-CM

## 2024-12-16 RX ORDER — PRAVASTATIN SODIUM 10 MG
10 TABLET ORAL
Qty: 90 TABLET | Refills: 1 | Status: SHIPPED | OUTPATIENT
Start: 2024-12-16

## 2024-12-17 ENCOUNTER — OFFICE VISIT (OUTPATIENT)
Dept: FAMILY MEDICINE CLINIC | Facility: CLINIC | Age: 69
End: 2024-12-17
Payer: COMMERCIAL

## 2024-12-17 VITALS
DIASTOLIC BLOOD PRESSURE: 66 MMHG | WEIGHT: 193 LBS | OXYGEN SATURATION: 95 % | HEIGHT: 55 IN | SYSTOLIC BLOOD PRESSURE: 132 MMHG | BODY MASS INDEX: 44.66 KG/M2 | HEART RATE: 63 BPM | TEMPERATURE: 97.6 F

## 2024-12-17 DIAGNOSIS — R05.2 SUBACUTE COUGH: ICD-10-CM

## 2024-12-17 DIAGNOSIS — I10 PRIMARY HYPERTENSION: Chronic | ICD-10-CM

## 2024-12-17 DIAGNOSIS — K86.2 PANCREATIC CYST: ICD-10-CM

## 2024-12-17 DIAGNOSIS — Z09 FOLLOW-UP EXAM: Primary | ICD-10-CM

## 2024-12-17 DIAGNOSIS — K21.9 GASTROESOPHAGEAL REFLUX DISEASE WITHOUT ESOPHAGITIS: ICD-10-CM

## 2024-12-17 DIAGNOSIS — N18.31 STAGE 3A CHRONIC KIDNEY DISEASE (HCC): Chronic | ICD-10-CM

## 2024-12-17 PROCEDURE — 99214 OFFICE O/P EST MOD 30 MIN: CPT

## 2024-12-17 RX ORDER — BENZONATATE 100 MG/1
100 CAPSULE ORAL 3 TIMES DAILY PRN
Qty: 20 CAPSULE | Refills: 0 | Status: SHIPPED | OUTPATIENT
Start: 2024-12-17

## 2024-12-17 NOTE — ASSESSMENT & PLAN NOTE
Lab Results   Component Value Date    EGFR 64 11/27/2024    EGFR 55 11/23/2024    EGFR 61 11/20/2024    CREATININE 0.92 11/27/2024    CREATININE 1.03 11/23/2024    CREATININE 0.95 11/20/2024   Under the management of nephrology

## 2024-12-17 NOTE — ASSESSMENT & PLAN NOTE
Make sure you have a thermometer and if you feel chills or sweats check it and write it down.  Take Tylenol for fevers, body aches, and headaches  Drink plenty of fluids to stay well hydrated at least 2 L per day  Hot water with lemon or honey, warm tea, Can drink Gatorade/Powerade zero, mix with water    Use over-the-counter saline nasal spray/allergy medication for congestion, runny nose, and postnasal drip  Over the counter Mucinex to help clear mucus. Delsym is a cough suppressant.   Vicks to the front/back of the chest bottom of the feet with socks   Stay out of public until afebrile >24 hours without use of antipyretics    Orders:    benzonatate (TESSALON PERLES) 100 mg capsule; Take 1 capsule (100 mg total) by mouth 3 (three) times a day as needed for cough    dextromethorphan-guaifenesin (MUCINEX DM)  MG per 12 hr tablet; Take 1 tablet by mouth every 12 (twelve) hours

## 2024-12-17 NOTE — PROGRESS NOTES
Name: Diana Conner      : 1955      MRN: [id number]  Encounter Provider: BRIE Hinojosa  Encounter Date: 2024   Encounter department: [facility]  :  Assessment & Plan  Follow-up exam         Stage 3a chronic kidney disease (HCC)  Lab Results   Component Value Date    EGFR 64 2024    EGFR 55 2024    EGFR 61 2024    CREATININE 0.92 2024    CREATININE 1.03 2024    CREATININE 0.95 2024   Under the management of nephrology        Primary hypertension  Today's /66  Current medication: Lisinopril 5mg  Continue current medication regimen        Gastroesophageal reflux disease without esophagitis  Current medication: pantoprazole 20mg   Continue current medication regimen        Pancreatic cyst  Under the management of GI   2024 CT abdomen with contrast  Stable one 1.5 cm pancreatic body cystic lesion, likely branch duct IPMN, unchanged from 2023 when measured similarly. No development of worrisome features or high-risk stigmata.   Based on published and institutional guidelines, follow-up imagingis recommended in 12 months. Preferably, this can be assessed with MRI/MRCP if patient's spinal stimulator is MRI conditional.       Subacute cough  Make sure you have a thermometer and if you feel chills or sweats check it and write it down.  Take Tylenol for fevers, body aches, and headaches  Drink plenty of fluids to stay well hydrated at least 2 L per day  Hot water with lemon or honey, warm tea, Can drink Gatorade/Powerade zero, mix with water    Use over-the-counter saline nasal spray/allergy medication for congestion, runny nose, and postnasal drip  Over the counter Mucinex to help clear mucus. Delsym is a cough suppressant.   Vicks to the front/back of the chest bottom of the feet with socks   Stay out of public until afebrile >24 hours without use of antipyretics    Orders:    benzonatate (TESSALON PERLES) 100 mg  "capsule; Take 1 capsule (100 mg total) by mouth 3 (three) times a day as needed for cough    dextromethorphan-guaifenesin (MUCINEX DM)  MG per 12 hr tablet; Take 1 tablet by mouth every 12 (twelve) hours           History of Present Illness     URI   This is a new problem. Episode onset: 12/12/24. The problem has been gradually worsening. There has been no fever. Associated symptoms include congestion, coughing, sneezing and a sore throat. Pertinent negatives include no abdominal pain, chest pain, diarrhea, dysuria, ear pain, headaches, joint pain, joint swelling, nausea, neck pain, plugged ear sensation, rash, rhinorrhea, sinus pain, swollen glands, vomiting or wheezing. Treatments tried: children's cough medicine. The treatment provided mild relief.     Review of Systems   Constitutional:  Negative for activity change, chills, fatigue and fever.   HENT:  Positive for congestion, sneezing and sore throat. Negative for ear pain, rhinorrhea, sinus pain and trouble swallowing.    Eyes:  Negative for pain and visual disturbance.   Respiratory:  Positive for cough. Negative for chest tightness, shortness of breath and wheezing.    Cardiovascular:  Negative for chest pain, palpitations and leg swelling.   Gastrointestinal:  Negative for abdominal pain, constipation, diarrhea, nausea and vomiting.   Genitourinary:  Negative for difficulty urinating, dysuria, hematuria and urgency.   Musculoskeletal:  Negative for arthralgias, back pain, joint pain and neck pain.   Skin:  Negative for color change and rash.   Neurological:  Negative for dizziness, seizures, syncope and headaches.   Psychiatric/Behavioral:  Negative for dysphoric mood. The patient is not nervous/anxious.    All other systems reviewed and are negative.      Objective   /66 (BP Location: Left arm, Patient Position: Sitting, Cuff Size: Adult)   Pulse 63   Temp 97.6 °F (36.4 °C) (Temporal)   Ht 4' 7\" (1.397 m)   Wt 87.5 kg (193 lb)   SpO2 95%  "  BMI 44.86 kg/m²      Physical Exam  Vitals and nursing note reviewed.   Constitutional:       General: She is not in acute distress.     Appearance: Normal appearance. She is well-developed and normal weight.   HENT:      Head: Normocephalic and atraumatic.      Right Ear: Tympanic membrane, ear canal and external ear normal. There is no impacted cerumen.      Left Ear: Tympanic membrane, ear canal and external ear normal. There is no impacted cerumen.      Nose: Congestion present.      Mouth/Throat:      Mouth: Mucous membranes are moist.      Pharynx: Oropharynx is clear.   Eyes:      Extraocular Movements: Extraocular movements intact.      Conjunctiva/sclera: Conjunctivae normal.      Pupils: Pupils are equal, round, and reactive to light.   Cardiovascular:      Rate and Rhythm: Normal rate and regular rhythm.      Pulses: Normal pulses.      Heart sounds: Normal heart sounds. No murmur heard.  Pulmonary:      Effort: Pulmonary effort is normal. No respiratory distress.      Breath sounds: Normal breath sounds.   Abdominal:      General: Bowel sounds are normal.      Palpations: Abdomen is soft.      Tenderness: There is no abdominal tenderness.   Musculoskeletal:         General: Normal range of motion.      Cervical back: Normal range of motion and neck supple.      Right lower leg: No edema.      Left lower leg: No edema.   Skin:     General: Skin is warm and dry.      Capillary Refill: Capillary refill takes less than 2 seconds.   Neurological:      General: No focal deficit present.      Mental Status: She is alert and oriented to person, place, and time. Mental status is at baseline.   Psychiatric:         Mood and Affect: Mood normal.         Behavior: Behavior normal.         Thought Content: Thought content normal.         Judgment: Judgment normal.       Administrative Statements   I have spent a total time of 20 minutes in caring for this patient on the day of the visit/encounter including  Diagnostic results, Prognosis, Risks and benefits of tx options, Instructions for management, Patient and family education, Importance of tx compliance, Risk factor reductions, Impressions, Counseling / Coordination of care, Documenting in the medical record, Reviewing / ordering tests, medicine, procedures  , and Obtaining or reviewing history  .

## 2024-12-17 NOTE — ASSESSMENT & PLAN NOTE
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 No development of worrisome features or high-risk stigmata.   Based on published and institutional guidelines, follow-up imagingis recommended in 12 months. Preferably, this can be assessed with MRI/MRCP if patient's spinal stimulator is MRI conditional.

## 2024-12-18 ENCOUNTER — HOSPITAL ENCOUNTER (OUTPATIENT)
Dept: INFUSION CENTER | Facility: HOSPITAL | Age: 69
Discharge: HOME | End: 2024-12-18
Attending: INTERNAL MEDICINE
Payer: COMMERCIAL

## 2024-12-18 VITALS
RESPIRATION RATE: 18 BRPM | DIASTOLIC BLOOD PRESSURE: 84 MMHG | TEMPERATURE: 96.5 F | SYSTOLIC BLOOD PRESSURE: 140 MMHG | HEART RATE: 62 BPM

## 2024-12-18 DIAGNOSIS — E61.1 HYPOFERREMIA: ICD-10-CM

## 2024-12-18 DIAGNOSIS — K55.20 AVM (ARTERIOVENOUS MALFORMATION) OF SMALL BOWEL, ACQUIRED: ICD-10-CM

## 2024-12-18 DIAGNOSIS — K27.4 GASTROINTESTINAL HEMORRHAGE ASSOCIATED WITH PEPTIC ULCER: Primary | ICD-10-CM

## 2024-12-18 PROCEDURE — 96372 THER/PROPH/DIAG INJ SC/IM: CPT

## 2024-12-18 PROCEDURE — 96365 THER/PROPH/DIAG IV INF INIT: CPT

## 2024-12-18 RX ORDER — SODIUM CHLORIDE 9 MG/ML
20 INJECTION, SOLUTION INTRAVENOUS ONCE
Status: CANCELLED | OUTPATIENT
Start: 2024-12-26

## 2024-12-18 RX ORDER — SODIUM CHLORIDE 9 MG/ML
20 INJECTION, SOLUTION INTRAVENOUS ONCE
Status: COMPLETED | OUTPATIENT
Start: 2024-12-18 | End: 2024-12-18

## 2024-12-18 RX ORDER — CYANOCOBALAMIN 1000 UG/ML
1000 INJECTION, SOLUTION INTRAMUSCULAR; SUBCUTANEOUS ONCE
Status: COMPLETED | OUTPATIENT
Start: 2024-12-18 | End: 2024-12-18

## 2024-12-18 RX ORDER — CYANOCOBALAMIN 1000 UG/ML
1000 INJECTION, SOLUTION INTRAMUSCULAR; SUBCUTANEOUS ONCE
Status: CANCELLED
Start: 2024-12-26 | End: 2024-12-25

## 2024-12-18 RX ADMIN — SODIUM CHLORIDE 20 ML/HR: 9 INJECTION, SOLUTION INTRAVENOUS at 12:09

## 2024-12-18 RX ADMIN — FERUMOXYTOL 510 MG: 510 INJECTION INTRAVENOUS at 12:13

## 2024-12-18 RX ADMIN — CYANOCOBALAMIN 1000 MCG: 1000 INJECTION INTRAMUSCULAR; SUBCUTANEOUS at 12:06

## 2024-12-18 NOTE — PROGRESS NOTES
Pt received feraheme infusion & B12 IM inj in RIGHT deltoid.  Tolerated well, offers no complaints. Confirmed next appt, AVS given

## 2024-12-26 ENCOUNTER — HOSPITAL ENCOUNTER (OUTPATIENT)
Dept: INFUSION CENTER | Facility: HOSPITAL | Age: 69
End: 2024-12-26
Attending: INTERNAL MEDICINE
Payer: COMMERCIAL

## 2024-12-26 VITALS
HEART RATE: 60 BPM | TEMPERATURE: 97.7 F | SYSTOLIC BLOOD PRESSURE: 157 MMHG | RESPIRATION RATE: 20 BRPM | DIASTOLIC BLOOD PRESSURE: 81 MMHG

## 2024-12-26 DIAGNOSIS — K55.20 AVM (ARTERIOVENOUS MALFORMATION) OF SMALL BOWEL, ACQUIRED: ICD-10-CM

## 2024-12-26 DIAGNOSIS — K27.4 GASTROINTESTINAL HEMORRHAGE ASSOCIATED WITH PEPTIC ULCER: Primary | ICD-10-CM

## 2024-12-26 DIAGNOSIS — E61.1 HYPOFERREMIA: ICD-10-CM

## 2024-12-26 PROCEDURE — 96365 THER/PROPH/DIAG IV INF INIT: CPT

## 2024-12-26 PROCEDURE — 96372 THER/PROPH/DIAG INJ SC/IM: CPT

## 2024-12-26 RX ORDER — SODIUM CHLORIDE 9 MG/ML
20 INJECTION, SOLUTION INTRAVENOUS ONCE
Status: COMPLETED | OUTPATIENT
Start: 2024-12-26 | End: 2024-12-26

## 2024-12-26 RX ORDER — CYANOCOBALAMIN 1000 UG/ML
1000 INJECTION, SOLUTION INTRAMUSCULAR; SUBCUTANEOUS ONCE
Status: COMPLETED | OUTPATIENT
Start: 2024-12-26 | End: 2024-12-26

## 2024-12-26 RX ORDER — CYANOCOBALAMIN 1000 UG/ML
1000 INJECTION, SOLUTION INTRAMUSCULAR; SUBCUTANEOUS ONCE
Status: CANCELLED
Start: 2025-01-01 | End: 2025-01-01

## 2024-12-26 RX ORDER — SODIUM CHLORIDE 9 MG/ML
20 INJECTION, SOLUTION INTRAVENOUS ONCE
Status: CANCELLED | OUTPATIENT
Start: 2025-01-01

## 2024-12-26 RX ADMIN — CYANOCOBALAMIN 1000 MCG: 1000 INJECTION INTRAMUSCULAR; SUBCUTANEOUS at 12:00

## 2024-12-26 RX ADMIN — SODIUM CHLORIDE 20 ML/HR: 0.9 INJECTION, SOLUTION INTRAVENOUS at 12:00

## 2024-12-26 RX ADMIN — FERUMOXYTOL 510 MG: 510 INJECTION INTRAVENOUS at 12:00

## 2024-12-26 NOTE — PROGRESS NOTES
Pt tolerated treatment today with no adverse reactions.B12 given in the left deltoid. AVS provided next apt 2/6/25 @ 1100. Left unit ambulatory with a steady gait.

## 2024-12-27 ENCOUNTER — OFFICE VISIT (OUTPATIENT)
Dept: GASTROENTEROLOGY | Facility: CLINIC | Age: 69
End: 2024-12-27
Payer: COMMERCIAL

## 2024-12-27 VITALS
HEIGHT: 55 IN | BODY MASS INDEX: 44.9 KG/M2 | SYSTOLIC BLOOD PRESSURE: 126 MMHG | DIASTOLIC BLOOD PRESSURE: 70 MMHG | TEMPERATURE: 97.4 F | WEIGHT: 194 LBS

## 2024-12-27 DIAGNOSIS — D64.9 SYMPTOMATIC ANEMIA: Primary | ICD-10-CM

## 2024-12-27 DIAGNOSIS — K55.20 AVM (ARTERIOVENOUS MALFORMATION) OF SMALL BOWEL, ACQUIRED: ICD-10-CM

## 2024-12-27 DIAGNOSIS — K21.9 GASTROESOPHAGEAL REFLUX DISEASE WITHOUT ESOPHAGITIS: ICD-10-CM

## 2024-12-27 DIAGNOSIS — D50.0 IRON DEFICIENCY ANEMIA DUE TO CHRONIC BLOOD LOSS: ICD-10-CM

## 2024-12-27 DIAGNOSIS — K58.0 IRRITABLE BOWEL SYNDROME WITH DIARRHEA: ICD-10-CM

## 2024-12-27 DIAGNOSIS — R93.2 ABNORMAL CT OF LIVER: ICD-10-CM

## 2024-12-27 DIAGNOSIS — K86.2 PANCREATIC CYST: ICD-10-CM

## 2024-12-27 PROCEDURE — 99214 OFFICE O/P EST MOD 30 MIN: CPT | Performed by: PHYSICIAN ASSISTANT

## 2024-12-27 PROCEDURE — G2211 COMPLEX E/M VISIT ADD ON: HCPCS | Performed by: PHYSICIAN ASSISTANT

## 2024-12-27 NOTE — PROGRESS NOTES
[facility] - Outpatient Consultation  Diana Conner 69 y.o. female MRN: [id number]  Encounter: [id number]          ASSESSMENT AND PLAN:      1. Symptomatic anemia  2. Iron deficiency anemia due to chronic blood loss  3.  AVM of small bowel, acquired  She was referred by hematology due to iron deficiency anemia refractory to oral iron. She underwent EGD and colonoscopy with TI intubation in December 2023 which were negative for bleeding. Capsule endoscopy shows nonbleeding AVM in the mid to distal jejunum. She denies overt bleeding. I recommend continuing to monitor her stools and if she would notice black tarry stools or red blood in the stool, she should call our office or go to the hospital if severe. Continue IV iron infusions and follow-up with hematology for blood monitoring. If her blood count would decline or she would stop responding to IV iron infusions, we would consider balloon enteroscopy for treatment of the AVM.     4. Abnormal CT of liver  CT abdomen with IV contrast 12/7/2024 incidentally shows cirrhotic liver morphology. No evidence of suspicious mass, biliary dilatation. The spleen appears normal size. No ascites.    I am not convinced she has cirrhosis, since her platelets and liver function testing is completely normal and there are no other signs of portal hypertension. Prior blood work for hepatitis B and C was negative.  Previous EGD negative for varices.    I suspect she likely has metabolic dysfunction associated steatotic liver disease (MASLD). Plan for RUQ ultrasound with elastography to assess for advanced fibrosis.    5. Irritable bowel syndrome with diarrhea  I suspect her lower abdominal discomfort, intermittent diarrhea and incontinence is related to IBS. Prior bidirectional endoscopy unremarkable and celiac serologies negative. Recommend starting Metamucil daily to help bulk the stool. She may use Imodium daily or as needed.    6. Lesion of pancreas  CT  abdomen with IV contrast 12/7/2024 shows 1.5 x 1.0 x 1.4 cm pancreatic body cystic lesion, unchanged from the year prior. There is probable ductal communication likely IPMN. No high risk features including no pancreatic ductal dilatation, mural nodule, septation or distal parenchymal atrophy. I recommend repeat CT scan in 1 year.  Unfortunately, we cannot obtain MRI due to nerve stimulator.    7. GERD without esophagitis  Stable. Previous EGD negative for esophagitis. Continue Protonix 20 mg daily.    Follow-up in 6 months  ______________________________________________________________________    HPI: 69-year-old female with hypertension, hyperlipidemia, prediabetes, obesity, stage IIIa CKD, former smoker, GERD, pancreas cyst, stress incontinence presenting for follow-up of iron deficiency anemia.    She has been feeling pretty well overall.  She denies fatigue.  She has been getting more IV iron infusions.  She did have an episode of bright red rectal bleeding in November related to constipation -likely hemorrhoid bleeding.  This has not recurred.  She takes Pepto-Bismol occasionally so sometimes her stools are darker in color.  She denies black tarry stools.  She has some lower abdominal discomfort associated with bowel movements which improves with a bowel movement.  She has intermittent diarrhea.  Her reflux is controlled with medication.    Historical Information   Past Medical History:   Diagnosis Date    Anemia 12/12/2023    Asthma     Carpal tunnel syndrome 07/11/2016    Gastroesophageal reflux disease without esophagitis 12/26/2023    Gout 02/13/2021    Hypertension 10/25/2023    Lateral epicondylitis 10/09/2015    Stage 3 chronic kidney disease, unspecified whether stage 3a or 3b CKD (HCC) 01/13/2022     Past Surgical History:   Procedure Laterality Date    IMPLANTATION VAGAL NERVE STIMULATOR       Social History   Social History     Substance and Sexual Activity   Alcohol Use Not Currently     Social  "History     Substance and Sexual Activity   Drug Use Never     Social History     Tobacco Use   Smoking Status Former    Current packs/day: 0.00    Types: Cigarettes    Quit date:     Years since quittin.9    Passive exposure: Past   Smokeless Tobacco Former   Tobacco Comments    exposure to passive smoke     Family History   Problem Relation Age of Onset    No Known Problems Mother     Kidney disease Father     No Known Problems Sister     No Known Problems Sister     No Known Problems Sister     No Known Problems Maternal Grandmother     No Known Problems Maternal Grandfather     No Known Problems Paternal Grandmother     No Known Problems Paternal Grandfather     Cancer Paternal Uncle     Breast cancer Neg Hx        Meds/Allergies       Current Outpatient Medications:     albuterol (PROVENTIL HFA,VENTOLIN HFA) 90 mcg/act inhaler    Ascorbic Acid (vitamin C) 1000 MG tablet    aspirin (ECOTRIN) 325 mg EC tablet    benzonatate (TESSALON PERLES) 100 mg capsule    cholecalciferol (VITAMIN D3) 1,000 units tablet    dextromethorphan-guaifenesin (MUCINEX DM)  MG per 12 hr tablet    lisinopril (ZESTRIL) 5 mg tablet    oxybutynin (DITROPAN-XL) 10 MG 24 hr tablet    pantoprazole (PROTONIX) 20 mg tablet    pravastatin (PRAVACHOL) 10 mg tablet  No current facility-administered medications for this visit.    Facility-Administered Medications Ordered in Other Visits:     alteplase (CATHFLO) injection 2 mg, 2 mg, Intracatheter, Q1MIN PRN    Allergies   Allergen Reactions    Lansoprazole Palpitations     Erythema/ tachycardia  Other reaction(s): tachycardia    Penicillins Itching and Rash     Other reaction(s): Rash  Other reaction(s): Rash           Objective     Blood pressure 126/70, temperature (!) 97.4 °F (36.3 °C), temperature source Tympanic, height 4' 7\" (1.397 m), weight 88 kg (194 lb). Body mass index is 45.09 kg/m².        PHYSICAL EXAM:      General Appearance:   Alert, cooperative, no distress   HEENT:  "  Normocephalic, atraumatic, anicteric.     Neck:  Supple, symmetrical, trachea midline   Lungs:   Clear to auscultation bilaterally   Heart::   Regular rate and rhythm   Abdomen:   Soft, non-tender, non-distended; normal bowel sounds   Genitalia:   Deferred    Rectal:   Deferred    Extremities:  No cyanosis, clubbing or edema    Pulses:  2+ and symmetric    Skin:  No jaundice, rashes, or lesions    Lymph nodes:  No palpable cervical lymphadenopathy        Lab Results:   No visits with results within 1 Day(s) from this visit.   Latest known visit with results is:   Appointment on 11/27/2024   Component Date Value    Sodium 11/27/2024 143     Potassium 11/27/2024 3.9     Chloride 11/27/2024 105     CO2 11/27/2024 27     ANION GAP 11/27/2024 11     BUN 11/27/2024 15     Creatinine 11/27/2024 0.92     Glucose, Fasting 11/27/2024 85     Calcium 11/27/2024 10.1     eGFR 11/27/2024 64     Creatinine, Ur 11/27/2024 176.3     Albumin,U,Random 11/27/2024 13.0     Albumin Creat Ratio 11/27/2024 7          Radiology Results:   No results found.

## 2024-12-27 NOTE — PATIENT INSTRUCTIONS
You can take Metamucil (psyllium fiber) daily to help improve stool consistency and prevent incontinence  You can use Imodium (loperamide) daily or as needed to prevent diarrhea if you're going out somewhere  If you would develop black tarry loose stools or red blood in the stool, please call our office or go to the ER if severe      U/s Scheduled for 1/5 at 10 am at [facility]

## 2025-01-05 ENCOUNTER — HOSPITAL ENCOUNTER (OUTPATIENT)
Dept: ULTRASOUND IMAGING | Facility: HOSPITAL | Age: 70
Discharge: HOME | End: 2025-01-05
Payer: MEDICARE

## 2025-01-05 DIAGNOSIS — R93.2 ABNORMAL CT OF LIVER: ICD-10-CM

## 2025-01-05 PROCEDURE — 76705 ECHO EXAM OF ABDOMEN: CPT

## 2025-01-05 PROCEDURE — 76981 USE PARENCHYMA: CPT

## 2025-01-09 ENCOUNTER — RESULTS FOLLOW-UP (OUTPATIENT)
Dept: GASTROENTEROLOGY | Facility: CLINIC | Age: 70
End: 2025-01-09

## 2025-01-09 DIAGNOSIS — K82.4 GALLBLADDER POLYP: Primary | ICD-10-CM

## 2025-01-09 NOTE — RESULT ENCOUNTER NOTE
Please call patient and let her know her ultrasound does NOT show any significant scarring in the liver, good news. We again see fat in the liver, as well as the cyst in the pancreas, which looks stable in size. There was also incidental finding of polyp in the gallbladder which does not appear worrisome. We would recommend having another ultrasound in 1 year for surveillance of this polyp. Please let me know if there are any questions or concerns.

## 2025-01-16 PROBLEM — R05.2 SUBACUTE COUGH: Status: RESOLVED | Noted: 2023-12-26 | Resolved: 2025-01-16

## 2025-02-06 ENCOUNTER — HOSPITAL ENCOUNTER (OUTPATIENT)
Dept: INFUSION CENTER | Facility: HOSPITAL | Age: 70
End: 2025-02-06
Attending: INTERNAL MEDICINE
Payer: COMMERCIAL

## 2025-02-06 VITALS
RESPIRATION RATE: 18 BRPM | SYSTOLIC BLOOD PRESSURE: 138 MMHG | TEMPERATURE: 97.8 F | HEART RATE: 68 BPM | DIASTOLIC BLOOD PRESSURE: 86 MMHG

## 2025-02-06 DIAGNOSIS — E61.1 HYPOFERREMIA: ICD-10-CM

## 2025-02-06 DIAGNOSIS — D50.8 OTHER IRON DEFICIENCY ANEMIA: Primary | ICD-10-CM

## 2025-02-06 DIAGNOSIS — K27.4 GASTROINTESTINAL HEMORRHAGE ASSOCIATED WITH PEPTIC ULCER: ICD-10-CM

## 2025-02-06 PROCEDURE — 96372 THER/PROPH/DIAG INJ SC/IM: CPT

## 2025-02-06 PROCEDURE — 96365 THER/PROPH/DIAG IV INF INIT: CPT

## 2025-02-06 RX ORDER — SODIUM CHLORIDE 9 MG/ML
20 INJECTION, SOLUTION INTRAVENOUS ONCE
Status: COMPLETED | OUTPATIENT
Start: 2025-02-06 | End: 2025-02-06

## 2025-02-06 RX ORDER — SODIUM CHLORIDE 9 MG/ML
20 INJECTION, SOLUTION INTRAVENOUS ONCE
OUTPATIENT
Start: 2025-03-20

## 2025-02-06 RX ORDER — CYANOCOBALAMIN 1000 UG/ML
1000 INJECTION, SOLUTION INTRAMUSCULAR; SUBCUTANEOUS ONCE
Status: COMPLETED | OUTPATIENT
Start: 2025-02-06 | End: 2025-02-06

## 2025-02-06 RX ORDER — CYANOCOBALAMIN 1000 UG/ML
1000 INJECTION, SOLUTION INTRAMUSCULAR; SUBCUTANEOUS ONCE
Start: 2025-03-20 | End: 2025-03-20

## 2025-02-06 RX ADMIN — CYANOCOBALAMIN 1000 MCG: 1000 INJECTION INTRAMUSCULAR; SUBCUTANEOUS at 11:11

## 2025-02-06 RX ADMIN — SODIUM CHLORIDE 20 ML/HR: 0.9 INJECTION, SOLUTION INTRAVENOUS at 11:11

## 2025-02-06 RX ADMIN — FERUMOXYTOL 510 MG: 510 INJECTION INTRAVENOUS at 11:20

## 2025-02-06 NOTE — PROGRESS NOTES
Diana Conner  tolerated treatment well with no complications.      Diana Conner is aware of future appt on 3/20 at 1100.     AVS printed and given to Diana Conner:    No (Declined by Diana Conner)

## 2025-03-01 DIAGNOSIS — E55.9 VITAMIN D DEFICIENCY: ICD-10-CM

## 2025-03-02 RX ORDER — CHOLECALCIFEROL (VITAMIN D3) 25 MCG
4000 TABLET ORAL DAILY
Qty: 360 TABLET | Refills: 1 | Status: SHIPPED | OUTPATIENT
Start: 2025-03-02

## 2025-03-05 DIAGNOSIS — K21.9 GASTROESOPHAGEAL REFLUX DISEASE WITHOUT ESOPHAGITIS: ICD-10-CM

## 2025-03-05 DIAGNOSIS — N39.46 MIXED STRESS AND URGE URINARY INCONTINENCE: ICD-10-CM

## 2025-03-05 DIAGNOSIS — I10 PRIMARY HYPERTENSION: ICD-10-CM

## 2025-03-05 DIAGNOSIS — R06.00 DYSPNEA, UNSPECIFIED TYPE: ICD-10-CM

## 2025-03-06 RX ORDER — OXYBUTYNIN CHLORIDE 10 MG/1
10 TABLET, EXTENDED RELEASE ORAL DAILY
Qty: 90 TABLET | Refills: 1 | Status: SHIPPED | OUTPATIENT
Start: 2025-03-06

## 2025-03-06 RX ORDER — PANTOPRAZOLE SODIUM 20 MG/1
TABLET, DELAYED RELEASE ORAL
Qty: 90 TABLET | Refills: 1 | Status: SHIPPED | OUTPATIENT
Start: 2025-03-06

## 2025-03-06 RX ORDER — LISINOPRIL 5 MG/1
5 TABLET ORAL DAILY
Qty: 90 TABLET | Refills: 1 | Status: SHIPPED | OUTPATIENT
Start: 2025-03-06

## 2025-03-06 RX ORDER — ALBUTEROL SULFATE 90 UG/1
INHALANT RESPIRATORY (INHALATION)
Qty: 8.5 G | Refills: 1 | Status: SHIPPED | OUTPATIENT
Start: 2025-03-06

## 2025-03-20 ENCOUNTER — HOSPITAL ENCOUNTER (OUTPATIENT)
Dept: INFUSION CENTER | Facility: HOSPITAL | Age: 70
Discharge: HOME | End: 2025-03-20
Attending: INTERNAL MEDICINE
Payer: COMMERCIAL

## 2025-03-20 VITALS
HEART RATE: 76 BPM | SYSTOLIC BLOOD PRESSURE: 124 MMHG | DIASTOLIC BLOOD PRESSURE: 76 MMHG | RESPIRATION RATE: 18 BRPM | TEMPERATURE: 97.7 F

## 2025-03-20 DIAGNOSIS — K27.4 GASTROINTESTINAL HEMORRHAGE ASSOCIATED WITH PEPTIC ULCER: ICD-10-CM

## 2025-03-20 DIAGNOSIS — D50.8 OTHER IRON DEFICIENCY ANEMIA: Primary | ICD-10-CM

## 2025-03-20 DIAGNOSIS — E61.1 HYPOFERREMIA: ICD-10-CM

## 2025-03-20 PROCEDURE — 96372 THER/PROPH/DIAG INJ SC/IM: CPT

## 2025-03-20 PROCEDURE — 96365 THER/PROPH/DIAG IV INF INIT: CPT

## 2025-03-20 RX ORDER — SODIUM CHLORIDE 9 MG/ML
20 INJECTION, SOLUTION INTRAVENOUS ONCE
Status: CANCELLED | OUTPATIENT
Start: 2025-03-20

## 2025-03-20 RX ORDER — SODIUM CHLORIDE 9 MG/ML
20 INJECTION, SOLUTION INTRAVENOUS ONCE
Status: COMPLETED | OUTPATIENT
Start: 2025-03-20 | End: 2025-03-20

## 2025-03-20 RX ORDER — CYANOCOBALAMIN 1000 UG/ML
1000 INJECTION, SOLUTION INTRAMUSCULAR; SUBCUTANEOUS ONCE
Status: COMPLETED | OUTPATIENT
Start: 2025-03-20 | End: 2025-03-20

## 2025-03-20 RX ORDER — CYANOCOBALAMIN 1000 UG/ML
1000 INJECTION, SOLUTION INTRAMUSCULAR; SUBCUTANEOUS ONCE
Status: CANCELLED
Start: 2025-03-20 | End: 2025-03-20

## 2025-03-20 RX ADMIN — SODIUM CHLORIDE 20 ML/HR: 9 INJECTION, SOLUTION INTRAVENOUS at 11:08

## 2025-03-20 RX ADMIN — FERUMOXYTOL 510 MG: 510 INJECTION INTRAVENOUS at 11:15

## 2025-03-20 RX ADMIN — CYANOCOBALAMIN 1000 MCG: 1000 INJECTION INTRAMUSCULAR; SUBCUTANEOUS at 11:05

## 2025-04-01 ENCOUNTER — APPOINTMENT (OUTPATIENT)
Dept: LAB | Facility: HOSPITAL | Age: 70
End: 2025-04-01
Payer: COMMERCIAL

## 2025-04-01 DIAGNOSIS — D53.9 NUTRITIONAL ANEMIA, UNSPECIFIED: ICD-10-CM

## 2025-04-01 DIAGNOSIS — K27.4 GASTROINTESTINAL HEMORRHAGE ASSOCIATED WITH PEPTIC ULCER: ICD-10-CM

## 2025-04-01 DIAGNOSIS — E61.1 HYPOFERREMIA: ICD-10-CM

## 2025-04-01 DIAGNOSIS — K55.20 AVM (ARTERIOVENOUS MALFORMATION) OF SMALL BOWEL, ACQUIRED: ICD-10-CM

## 2025-04-01 LAB
BASOPHILS # BLD AUTO: 0.04 THOUSANDS/ÂΜL (ref 0–0.1)
BASOPHILS NFR BLD AUTO: 1 % (ref 0–1)
EOSINOPHIL # BLD AUTO: 0.2 THOUSAND/ÂΜL (ref 0–0.61)
EOSINOPHIL NFR BLD AUTO: 3 % (ref 0–6)
ERYTHROCYTE [DISTWIDTH] IN BLOOD BY AUTOMATED COUNT: 14.7 % (ref 11.6–15.1)
FERRITIN SERPL-MCNC: 202 NG/ML (ref 11–307)
FOLATE SERPL-MCNC: >22.3 NG/ML
HCT VFR BLD AUTO: 43.8 % (ref 34.8–46.1)
HGB BLD-MCNC: 14 G/DL (ref 11.5–15.4)
IMM GRANULOCYTES # BLD AUTO: 0.02 THOUSAND/UL (ref 0–0.2)
IMM GRANULOCYTES NFR BLD AUTO: 0 % (ref 0–2)
IRON SATN MFR SERPL: 27 % (ref 15–50)
IRON SERPL-MCNC: 101 UG/DL (ref 50–212)
LYMPHOCYTES # BLD AUTO: 1.28 THOUSANDS/ÂΜL (ref 0.6–4.47)
LYMPHOCYTES NFR BLD AUTO: 22 % (ref 14–44)
MCH RBC QN AUTO: 28.9 PG (ref 26.8–34.3)
MCHC RBC AUTO-ENTMCNC: 32 G/DL (ref 31.4–37.4)
MCV RBC AUTO: 90 FL (ref 82–98)
MONOCYTES # BLD AUTO: 0.43 THOUSAND/ÂΜL (ref 0.17–1.22)
MONOCYTES NFR BLD AUTO: 7 % (ref 4–12)
NEUTROPHILS # BLD AUTO: 3.91 THOUSANDS/ÂΜL (ref 1.85–7.62)
NEUTS SEG NFR BLD AUTO: 67 % (ref 43–75)
NRBC BLD AUTO-RTO: 0 /100 WBCS
PLATELET # BLD AUTO: 181 THOUSANDS/UL (ref 149–390)
PMV BLD AUTO: 10.9 FL (ref 8.9–12.7)
RBC # BLD AUTO: 4.85 MILLION/UL (ref 3.81–5.12)
TIBC SERPL-MCNC: 372.4 UG/DL (ref 250–450)
TRANSFERRIN SERPL-MCNC: 266 MG/DL (ref 203–362)
UIBC SERPL-MCNC: 271 UG/DL (ref 155–355)
VIT B12 SERPL-MCNC: 642 PG/ML (ref 180–914)
WBC # BLD AUTO: 5.88 THOUSAND/UL (ref 4.31–10.16)

## 2025-04-01 PROCEDURE — 83550 IRON BINDING TEST: CPT

## 2025-04-01 PROCEDURE — 82746 ASSAY OF FOLIC ACID SERUM: CPT

## 2025-04-01 PROCEDURE — 85025 COMPLETE CBC W/AUTO DIFF WBC: CPT

## 2025-04-01 PROCEDURE — 36415 COLL VENOUS BLD VENIPUNCTURE: CPT

## 2025-04-01 PROCEDURE — 82607 VITAMIN B-12: CPT

## 2025-04-01 PROCEDURE — 82728 ASSAY OF FERRITIN: CPT

## 2025-04-01 PROCEDURE — 83540 ASSAY OF IRON: CPT

## 2025-04-02 ENCOUNTER — TELEPHONE (OUTPATIENT)
Age: 70
End: 2025-04-02

## 2025-04-02 NOTE — TELEPHONE ENCOUNTER
Patient called, questions if NP Daiana Lees ordered labs to completed in time for patients MAW 6/25/2025. Patient request a callback with suggestions, please advise at [phone], if any further questions.

## 2025-04-07 DIAGNOSIS — R73.03 PREDIABETES: Primary | ICD-10-CM

## 2025-04-14 ENCOUNTER — OFFICE VISIT (OUTPATIENT)
Dept: HEMATOLOGY ONCOLOGY | Facility: CLINIC | Age: 70
End: 2025-04-14
Payer: COMMERCIAL

## 2025-04-14 ENCOUNTER — TELEPHONE (OUTPATIENT)
Age: 70
End: 2025-04-14

## 2025-04-14 VITALS
TEMPERATURE: 98 F | DIASTOLIC BLOOD PRESSURE: 64 MMHG | SYSTOLIC BLOOD PRESSURE: 128 MMHG | BODY MASS INDEX: 44.2 KG/M2 | OXYGEN SATURATION: 97 % | WEIGHT: 191 LBS | HEIGHT: 55 IN | HEART RATE: 72 BPM | RESPIRATION RATE: 18 BRPM

## 2025-04-14 DIAGNOSIS — R21 FACIAL RASH: ICD-10-CM

## 2025-04-14 DIAGNOSIS — K27.4 GASTROINTESTINAL HEMORRHAGE ASSOCIATED WITH PEPTIC ULCER: ICD-10-CM

## 2025-04-14 DIAGNOSIS — D50.0 CHRONIC BLOOD LOSS ANEMIA: ICD-10-CM

## 2025-04-14 DIAGNOSIS — K55.20 AVM (ARTERIOVENOUS MALFORMATION) OF SMALL BOWEL, ACQUIRED: Primary | ICD-10-CM

## 2025-04-14 PROCEDURE — 99214 OFFICE O/P EST MOD 30 MIN: CPT | Performed by: PHYSICIAN ASSISTANT

## 2025-04-14 PROCEDURE — G2211 COMPLEX E/M VISIT ADD ON: HCPCS | Performed by: PHYSICIAN ASSISTANT

## 2025-04-14 NOTE — TELEPHONE ENCOUNTER
Pt called to follow up on message sent from her hematologist to Daiana regarding facial rash. Please kindly follow up with pt at [phone].

## 2025-04-14 NOTE — ASSESSMENT & PLAN NOTE
As above  Orders:  •  CBC and differential; Future  •  Iron Panel (Includes Ferritin, Iron Sat%, Iron, and TIBC); Future

## 2025-04-14 NOTE — PROGRESS NOTES
Name: Diana Conner      : 1955      MRN: [id number]  Encounter Provider: Kavya Alcocer PA-C  Encounter Date: 2025   Encounter department: 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     This is a 69-year-old female with past medical history of AVM as well as jejunal ulcers.  Patient underwent IV iron supplementation and then maintenance IV iron with excellent results.  Presently patient's ferritin is over 200 and patient's iron saturation is agreeable.  Hemoglobin has improved by over 2 g/dL.    Patient's parameters are acceptable.  No additional maintenance is necessary at this time.  Recommendation is to follow-up in 4 months with blood work prior.  We reviewed signs and symptoms of progressive anemia and recurrent GI blood loss.  Patient understands contact the office with any questions or concerns.  Orders:  •  CBC and differential; Future  •  Iron Panel (Includes Ferritin, Iron Sat%, Iron, and TIBC); Future    AVM (arteriovenous malformation) of small bowel, acquired  As above  Orders:  •  CBC and differential; Future  •  Iron Panel (Includes Ferritin, Iron Sat%, Iron, and TIBC); Future    Gastrointestinal hemorrhage associated with peptic ulcer  As above  Orders:  •  CBC and differential; Future  •  Iron Panel (Includes Ferritin, Iron Sat%, Iron, and TIBC); Future    Facial rash  Patient today presents with facial rash.  I sent photo of this crash to patient's PCP for further assessment previous diagnosis was rosacea however the patient has an old prescription of Cleocin which she is using last use approximately 2 months ago.  Recommendation is to follow-up with PCP not only for the facial rash but with continued fatigue.    Communication sent to primary care doctor who responded to me after the patient  left the office.       Return in about 4 months (around 8/14/2025) for [facility] Office Visit, Labs.    History of Present Illness   Chief Complaint   Patient presents with   • Follow-up     Pertinent Medical History   This is a 69-year-old female with past medical history of GERD, gout, hypertension, stage III kidney disease, asthma and more recently iron deficiency anemia discovered in December 2023 who presents to the hematology clinic at the request of the PCP secondary to refractory iron deficiency anemia on oral iron x 8 months.     Historical CBCs:  4/20/2018 WBC 5.9, hemoglobin 14.7, platelet count 171  2/13/2021 WBC 8.1, hemoglobin 14.6, platelets 164  2/6/2023 WBC 4.9, hemoglobin 13.6, MCV 91, RDW 13.9, platelet count 180     In December 2023 patient presented to the emergency room via EMS secondary to weakness and bright red blood per rectum.  Hemoglobin of 5.8 found on presentation, patient was transfused with 2 units of packed red blood cells.              12/12/2023 ferritin = 2, iron saturation 1%       12/13/2023 colonoscopy demonstrated hemorrhoids and diverticulosis  endoscopy stomach and duodenum appeared normal-esophagus demonstrated goblet cells with minimal intestinal metaplasia.  Discharge blood work demonstrated a hemoglobin of 8.2 g/dL with an MCV of 66 RDW of 22.5              -Discharged home on oral supplements     7/9/2024 WBC 6.9, hemoglobin 8.9, MCV 71, RDW 18.7, platelet count 246              ON 18, creatinine 1.02, EGFR 57              Iron saturation 3%, TIBC 88, ferritin 3     9/5 and 9/12/2024: Feraheme 510 mg IV weekly x 2 doses              9/29/2024 hemoglobin 13.1, MCV 79              10/1/2024: Capsule endoscopy demonstrated a jejunal AVM and distal jejunal ulcerations.  11/20/2024 WBC 4.8, hemoglobin 11.8, MCV 84, platelet count 184                          Ferritin 9, TIBC 437       04/14/25: Feeling ok- tired. Facial rash-recently diagnosed with rosacea.     Review  "of Systems        Objective   /64 (BP Location: Left arm, Patient Position: Sitting, Cuff Size: Adult)   Pulse 72   Temp 98 °F (36.7 °C) (Temporal)   Resp 18   Ht 4' 7\" (1.397 m)   Wt 86.6 kg (191 lb)   SpO2 97%   BMI 44.39 kg/m²     Physical Exam  Constitutional:       General: She is not in acute distress.     Appearance: She is well-developed.   HENT:      Head: Normocephalic and atraumatic.   Eyes:      General: No scleral icterus.     Conjunctiva/sclera: Conjunctivae normal.   Cardiovascular:      Rate and Rhythm: Normal rate.   Pulmonary:      Effort: Pulmonary effort is normal. No respiratory distress.   Skin:     General: Skin is warm.      Coloration: Skin is not pale.      Findings: Rash present.   Neurological:      Mental Status: She is alert and oriented to person, place, and time.   Psychiatric:         Thought Content: Thought content normal.         Labs: I have reviewed the following labs:  Results for orders placed or performed in visit on 04/01/25   CBC and differential   Result Value Ref Range    WBC 5.88 4.31 - 10.16 Thousand/uL    RBC 4.85 3.81 - 5.12 Million/uL    Hemoglobin 14.0 11.5 - 15.4 g/dL    Hematocrit 43.8 34.8 - 46.1 %    MCV 90 82 - 98 fL    MCH 28.9 26.8 - 34.3 pg    MCHC 32.0 31.4 - 37.4 g/dL    RDW 14.7 11.6 - 15.1 %    MPV 10.9 8.9 - 12.7 fL    Platelets 181 149 - 390 Thousands/uL    nRBC 0 /100 WBCs    Segmented % 67 43 - 75 %    Immature Grans % 0 0 - 2 %    Lymphocytes % 22 14 - 44 %    Monocytes % 7 4 - 12 %    Eosinophils Relative 3 0 - 6 %    Basophils Relative 1 0 - 1 %    Absolute Neutrophils 3.91 1.85 - 7.62 Thousands/µL    Absolute Immature Grans 0.02 0.00 - 0.20 Thousand/uL    Absolute Lymphocytes 1.28 0.60 - 4.47 Thousands/µL    Absolute Monocytes 0.43 0.17 - 1.22 Thousand/µL    Eosinophils Absolute 0.20 0.00 - 0.61 Thousand/µL    Basophils Absolute 0.04 0.00 - 0.10 Thousands/µL   Vitamin B12   Result Value Ref Range    Vitamin B-12 642 180 - 914 pg/mL "   Result Value Ref Range    Folate >22.3 >5.9 ng/mL   TIBC Panel (incl. Iron, TIBC, % Iron Saturation)   Result Value Ref Range    Iron Saturation 27 15 - 50 %    TIBC 372.4 250 - 450 ug/dL    Iron 101 50 - 212 ug/dL    Transferrin 266 203 - 362 mg/dL    UIBC 271 155 - 355 ug/dL   Result Value Ref Range    Ferritin 202 11 - 307 ng/mL

## 2025-04-17 ENCOUNTER — OFFICE VISIT (OUTPATIENT)
Dept: FAMILY MEDICINE CLINIC | Facility: CLINIC | Age: 70
End: 2025-04-17
Payer: COMMERCIAL

## 2025-04-17 VITALS
HEIGHT: 55 IN | WEIGHT: 187.69 LBS | DIASTOLIC BLOOD PRESSURE: 70 MMHG | TEMPERATURE: 98 F | SYSTOLIC BLOOD PRESSURE: 136 MMHG | HEART RATE: 57 BPM | OXYGEN SATURATION: 97 % | BODY MASS INDEX: 43.43 KG/M2

## 2025-04-17 DIAGNOSIS — J45.20 MILD INTERMITTENT ASTHMA, UNSPECIFIED WHETHER COMPLICATED: ICD-10-CM

## 2025-04-17 DIAGNOSIS — N18.31 CHRONIC KIDNEY DISEASE, STAGE 3A (HCC): ICD-10-CM

## 2025-04-17 DIAGNOSIS — L30.9 DERMATITIS: Primary | ICD-10-CM

## 2025-04-17 PROBLEM — E66.01 MORBID OBESITY WITH BMI OF 45.0-49.9, ADULT (HCC): Chronic | Status: RESOLVED | Noted: 2023-10-25 | Resolved: 2025-04-17

## 2025-04-17 PROCEDURE — 99213 OFFICE O/P EST LOW 20 MIN: CPT

## 2025-04-17 RX ORDER — FLUTICASONE FUROATE AND VILANTEROL 100; 25 UG/1; UG/1
1 POWDER RESPIRATORY (INHALATION) DAILY
Qty: 60 BLISTER | Refills: 0 | Status: SHIPPED | OUTPATIENT
Start: 2025-04-17 | End: 2025-05-17

## 2025-04-17 RX ORDER — METHYLPREDNISOLONE 4 MG/1
TABLET ORAL
Qty: 21 EACH | Refills: 0 | Status: SHIPPED | OUTPATIENT
Start: 2025-04-17

## 2025-04-17 RX ORDER — TRIAMCINOLONE ACETONIDE 1 MG/G
OINTMENT TOPICAL
Qty: 30 G | Refills: 0 | Status: SHIPPED | OUTPATIENT
Start: 2025-04-17

## 2025-04-17 NOTE — PROGRESS NOTES
Name: Diana Conner      : 1955      MRN: [id number]  Encounter Provider: BRIE Hinojosa  Encounter Date: 2025   Encounter department: [facility]  :  Assessment & Plan  Dermatitis  Facial rash attributing to shaving face with a razor   Prednisone ordered   Encouraged daily use of facial moisturizer (Cerave daily facial moisturizer)   Orders:    Allergen, MOLD Panel; Future    triamcinolone (KENALOG) 0.1 % ointment; Apply topically daily at bedtime    methylPREDNISolone 4 MG tablet therapy pack; Use as directed on package    Chronic kidney disease, stage 3a (HCC)  Lab Results   Component Value Date    EGFR 64 2024    EGFR 55 2024    EGFR 61 2024    CREATININE 0.92 2024    CREATININE 1.03 2024    CREATININE 0.95 2024            Mild intermittent asthma, unspecified whether complicated  Current medications: Albuterol 90mcg/act   New medication: Breo 100-25mcg/act and albuterol 90mcg/act   Consider PFT if symptoms do not improve   Orders:    Allergen, MOLD Panel; Future    Fluticasone Furoate-Vilanterol 100-25 mcg/actuation inhaler; Inhale 1 puff daily Rinse mouth after use.           History of Present Illness   Rash  This is a new problem. The current episode started in the past 7 days. The problem has been gradually worsening since onset. The affected locations include the face. The problem is mild. The rash is characterized by dryness, redness and itchiness. Associated with: Old razor. Associated symptoms include itching. Pertinent negatives include no congestion, cough, diarrhea, fatigue, fever, rhinorrhea, shortness of breath, sore throat or vomiting. Past treatments include antibiotic cream. The treatment provided no relief.     Review of Systems   Constitutional:  Negative for activity change, chills, fatigue and fever.   HENT:  Negative for congestion, ear pain, rhinorrhea, sore throat and trouble swallowing.    Eyes:   "Negative for pain and visual disturbance.   Respiratory:  Negative for cough, chest tightness and shortness of breath.    Cardiovascular:  Negative for chest pain, palpitations and leg swelling.   Gastrointestinal:  Negative for abdominal pain, constipation, diarrhea, nausea and vomiting.   Genitourinary:  Negative for difficulty urinating, dysuria, hematuria and urgency.   Musculoskeletal:  Negative for arthralgias and back pain.   Skin:  Positive for itching and rash. Negative for color change.   Neurological:  Negative for dizziness, seizures, syncope and headaches.   Psychiatric/Behavioral:  Negative for dysphoric mood. The patient is not nervous/anxious.    All other systems reviewed and are negative.      Objective   /70 (BP Location: Left arm, Patient Position: Sitting, Cuff Size: Large)   Pulse 57   Temp 98 °F (36.7 °C) (Temporal)   Ht 4' 7\" (1.397 m)   Wt 85.1 kg (187 lb 11 oz)   SpO2 97%   BMI 43.62 kg/m²      Physical Exam  Vitals and nursing note reviewed.   Constitutional:       General: She is not in acute distress.     Appearance: Normal appearance. She is well-developed and normal weight.   HENT:      Head: Normocephalic and atraumatic.      Right Ear: Tympanic membrane, ear canal and external ear normal. There is no impacted cerumen.      Left Ear: Tympanic membrane, ear canal and external ear normal. There is no impacted cerumen.      Nose: Nose normal.      Mouth/Throat:      Mouth: Mucous membranes are moist.      Pharynx: Oropharynx is clear.   Eyes:      Extraocular Movements: Extraocular movements intact.      Conjunctiva/sclera: Conjunctivae normal.      Pupils: Pupils are equal, round, and reactive to light.   Cardiovascular:      Rate and Rhythm: Normal rate and regular rhythm.      Pulses: Normal pulses.      Heart sounds: Normal heart sounds. No murmur heard.  Pulmonary:      Effort: Pulmonary effort is normal. No respiratory distress.      Breath sounds: Normal breath sounds. "   Abdominal:      General: Bowel sounds are normal.      Palpations: Abdomen is soft.      Tenderness: There is no abdominal tenderness.   Musculoskeletal:         General: Normal range of motion.      Cervical back: Normal range of motion and neck supple.      Right lower leg: No edema.      Left lower leg: No edema.   Skin:     General: Skin is warm and dry.      Capillary Refill: Capillary refill takes less than 2 seconds.   Neurological:      General: No focal deficit present.      Mental Status: She is alert and oriented to person, place, and time. Mental status is at baseline.   Psychiatric:         Mood and Affect: Mood normal.         Behavior: Behavior normal.         Thought Content: Thought content normal.         Judgment: Judgment normal.       Administrative Statements   I have spent a total time of 20 minutes in caring for this patient on the day of the visit/encounter including Diagnostic results, Prognosis, Risks and benefits of tx options, Instructions for management, Patient and family education, Importance of tx compliance, Risk factor reductions, Impressions, Counseling / Coordination of care, Documenting in the medical record, Reviewing/placing orders in the medical record (including tests, medications, and/or procedures), and Obtaining or reviewing history  .    Patient Instructions   Patient Education     Contact dermatitis   The Basics   Written by the doctors and editors at [facility]   What is dermatitis? -- Dermatitis is a type of skin rash that can happen after your skin touches something that irritates it or something you are allergic to.  Things that irritate the skin can be found in products that you use every day, such as soaps or cleansers. Some of the things that can cause skin allergies include:   Certain medicines, perfumes, or cosmetics   The metal in some kinds of jewelry   Plants, such as poison ivy and poison oak  Sometimes, you can develop a rash the first time you touch  something. But it is also possible to get a rash from something that you have used before without any problems.  What other symptoms should I watch for? -- If you have a rash, your skin might be dry, itchy, or cracked (picture 1). In people with light skin, the rash is often red. In people with darker skin, it might appear purple, brown, gray, or black (picture 2). If your rash is caused by an allergy, you might also have some swelling or blisters where you have the rash.  Severe symptoms include:   Pain   Widespread swelling   Blisters, oozing, or crusting of the skin  Is there anything I can do on my own? -- Yes. You can:   Avoid using or touching whatever might have caused your rash.   Protect your skin from anything that might irritate it or cause an allergy. For example, wear gloves if you need to work with harsh soaps.   Use cool or warm water, not hot, for baths and showers. You can also try a special kind of bath called an oatmeal bath.   Try using soothing skin products to help with the itching and discomfort. Examples include thick moisturizing cream or petroleum jelly. Put this on your skin right after you get out of the bath or shower and after washing your hands.   Avoid scratching your skin. It might help to:   Wear cotton gloves at night.   Keep your nails short and clean.   Cover the parts of your skin that itch.  How are skin rashes treated? -- Your doctor might prescribe different treatments or medicines to help your rash heal. These can include:   Steroid creams and ointments - These go on the skin, and they relieve itching and redness.   Steroid pills - You might need to take these for a short time if your rash is severe. But your doctor or nurse will want to take you off of the steroid pills as soon as possible. Even though these medicines help, they can also cause problems of their own.   Wet or damp dressings - These can be helpful for skin that is crusting or oozing. To use a wet or damp  dressing, you need to wear 2 layers of clothing. First, put on a layer of damp cotton clothes over your rash. Then, put on a layer of dry clothes on top of the damp ones. People who need these dressings often wear them at night when they sleep.  When should I call the doctor? -- Call your doctor or nurse for advice if:   You have a rash that does not go away within 2 weeks.   Your rash gets worse or spreads over large parts of your body.   You have signs of infection like swelling, warmth, pain, or fever.  All topics are updated as new evidence becomes available and our peer review process is complete.  This topic retrieved from [employer] on: Feb 26, 2024.  Topic 11783 Version 12.0  Release: 32.2.4 - C32.56  © 2024 UpToDate, Inc. and/or its affiliates. All rights reserved.  picture 1: Chronic irritant contact dermatitis     If you have dermatitis, your skin might be red, dry, itchy, or cracked.  Graphic 565917 Version 2.0  picture 2: Dermatitis caused by nickel allergy     This person has an allergy to nickel, a type of metal. They have dermatitis where the button of their jeans touched their skin.  Graphic 623018 Version 1.0  Consumer Information Use and Disclaimer   Disclaimer: This generalized information is a limited summary of diagnosis, treatment, and/or medication information. It is not meant to be comprehensive and should be used as a tool to help the user understand and/or assess potential diagnostic and treatment options. It does NOT include all information about conditions, treatments, medications, side effects, or risks that may apply to a specific patient. It is not intended to be medical advice or a substitute for the medical advice, diagnosis, or treatment of a health care provider based on the health care provider's examination and assessment of a patient's specific and unique circumstances. Patients must speak with a health care provider for complete information about their health, medical questions,  and treatment options, including any risks or benefits regarding use of medications. This information does not endorse any treatments or medications as safe, effective, or approved for treating a specific patient. UpToDate, Inc. and its affiliates disclaim any warranty or liability relating to this information or the use thereof.The use of this information is governed by the Terms of Use, available at https://www.[employer].com/en/know/clinical-effectiveness-terms. 2024© UpToDate, Inc. and its affiliates and/or licensors. All rights reserved.  Copyright   © 2024 UpToDate, Inc. and/or its affiliates. All rights reserved.

## 2025-04-17 NOTE — PATIENT INSTRUCTIONS
Patient Education     Contact dermatitis   The Basics   Written by the doctors and editors at [facility]   What is dermatitis? -- Dermatitis is a type of skin rash that can happen after your skin touches something that irritates it or something you are allergic to.  Things that irritate the skin can be found in products that you use every day, such as soaps or cleansers. Some of the things that can cause skin allergies include:   Certain medicines, perfumes, or cosmetics   The metal in some kinds of jewelry   Plants, such as poison ivy and poison oak  Sometimes, you can develop a rash the first time you touch something. But it is also possible to get a rash from something that you have used before without any problems.  What other symptoms should I watch for? -- If you have a rash, your skin might be dry, itchy, or cracked (picture 1). In people with light skin, the rash is often red. In people with darker skin, it might appear purple, brown, gray, or black (picture 2). If your rash is caused by an allergy, you might also have some swelling or blisters where you have the rash.  Severe symptoms include:   Pain   Widespread swelling   Blisters, oozing, or crusting of the skin  Is there anything I can do on my own? -- Yes. You can:   Avoid using or touching whatever might have caused your rash.   Protect your skin from anything that might irritate it or cause an allergy. For example, wear gloves if you need to work with harsh soaps.   Use cool or warm water, not hot, for baths and showers. You can also try a special kind of bath called an oatmeal bath.   Try using soothing skin products to help with the itching and discomfort. Examples include thick moisturizing cream or petroleum jelly. Put this on your skin right after you get out of the bath or shower and after washing your hands.   Avoid scratching your skin. It might help to:   Wear cotton gloves at night.   Keep your nails short and clean.   Cover the parts of your  skin that itch.  How are skin rashes treated? -- Your doctor might prescribe different treatments or medicines to help your rash heal. These can include:   Steroid creams and ointments - These go on the skin, and they relieve itching and redness.   Steroid pills - You might need to take these for a short time if your rash is severe. But your doctor or nurse will want to take you off of the steroid pills as soon as possible. Even though these medicines help, they can also cause problems of their own.   Wet or damp dressings - These can be helpful for skin that is crusting or oozing. To use a wet or damp dressing, you need to wear 2 layers of clothing. First, put on a layer of damp cotton clothes over your rash. Then, put on a layer of dry clothes on top of the damp ones. People who need these dressings often wear them at night when they sleep.  When should I call the doctor? -- Call your doctor or nurse for advice if:   You have a rash that does not go away within 2 weeks.   Your rash gets worse or spreads over large parts of your body.   You have signs of infection like swelling, warmth, pain, or fever.  All topics are updated as new evidence becomes available and our peer review process is complete.  This topic retrieved from [employer] on: Feb 26, 2024.  Topic 25562 Version 12.0  Release: 32.2.4 - C32.56  © 2024 UpToDate, Inc. and/or its affiliates. All rights reserved.  picture 1: Chronic irritant contact dermatitis     If you have dermatitis, your skin might be red, dry, itchy, or cracked.  Graphic 019935 Version 2.0  picture 2: Dermatitis caused by nickel allergy     This person has an allergy to nickel, a type of metal. They have dermatitis where the button of their jeans touched their skin.  Graphic 883712 Version 1.0  Consumer Information Use and Disclaimer   Disclaimer: This generalized information is a limited summary of diagnosis, treatment, and/or medication information. It is not meant to be comprehensive  and should be used as a tool to help the user understand and/or assess potential diagnostic and treatment options. It does NOT include all information about conditions, treatments, medications, side effects, or risks that may apply to a specific patient. It is not intended to be medical advice or a substitute for the medical advice, diagnosis, or treatment of a health care provider based on the health care provider's examination and assessment of a patient's specific and unique circumstances. Patients must speak with a health care provider for complete information about their health, medical questions, and treatment options, including any risks or benefits regarding use of medications. This information does not endorse any treatments or medications as safe, effective, or approved for treating a specific patient. UpToDate, Inc. and its affiliates disclaim any warranty or liability relating to this information or the use thereof.The use of this information is governed by the Terms of Use, available at https://www.[employer].com/en/know/clinical-effectiveness-terms. 2024© UpToDate, Inc. and its affiliates and/or licensors. All rights reserved.  Copyright   © 2024 UpToDate, Inc. and/or its affiliates. All rights reserved.

## 2025-04-19 ENCOUNTER — HOSPITAL ENCOUNTER (EMERGENCY)
Facility: HOSPITAL | Age: 70
Discharge: HOME | End: 2025-04-19
Attending: EMERGENCY MEDICINE
Payer: COMMERCIAL

## 2025-04-19 ENCOUNTER — APPOINTMENT (EMERGENCY)
Dept: CT IMAGING | Facility: HOSPITAL | Age: 70
End: 2025-04-19
Payer: COMMERCIAL

## 2025-04-19 VITALS
BODY MASS INDEX: 43.7 KG/M2 | TEMPERATURE: 97.5 F | SYSTOLIC BLOOD PRESSURE: 176 MMHG | DIASTOLIC BLOOD PRESSURE: 93 MMHG | WEIGHT: 188 LBS | HEART RATE: 78 BPM | OXYGEN SATURATION: 93 % | RESPIRATION RATE: 18 BRPM

## 2025-04-19 DIAGNOSIS — R10.9 ABDOMINAL DISCOMFORT: Primary | ICD-10-CM

## 2025-04-19 LAB
ALBUMIN SERPL BCG-MCNC: 4.2 G/DL (ref 3.5–5)
ALP SERPL-CCNC: 51 U/L (ref 34–104)
ALT SERPL W P-5'-P-CCNC: 16 U/L (ref 7–52)
ANION GAP SERPL CALCULATED.3IONS-SCNC: 12 MMOL/L (ref 4–13)
AST SERPL W P-5'-P-CCNC: 19 U/L (ref 13–39)
BACTERIA UR QL AUTO: ABNORMAL /HPF
BASOPHILS # BLD AUTO: 0.03 THOUSANDS/ÂΜL (ref 0–0.1)
BASOPHILS NFR BLD AUTO: 1 % (ref 0–1)
BILIRUB SERPL-MCNC: 0.56 MG/DL (ref 0.2–1)
BILIRUB UR QL STRIP: NEGATIVE
BUN SERPL-MCNC: 13 MG/DL (ref 5–25)
CALCIUM SERPL-MCNC: 9.7 MG/DL (ref 8.4–10.2)
CHLORIDE SERPL-SCNC: 106 MMOL/L (ref 96–108)
CLARITY UR: CLEAR
CO2 SERPL-SCNC: 25 MMOL/L (ref 21–32)
COLOR UR: ABNORMAL
CREAT SERPL-MCNC: 0.91 MG/DL (ref 0.6–1.3)
EOSINOPHIL # BLD AUTO: 0.18 THOUSAND/ÂΜL (ref 0–0.61)
EOSINOPHIL NFR BLD AUTO: 4 % (ref 0–6)
ERYTHROCYTE [DISTWIDTH] IN BLOOD BY AUTOMATED COUNT: 14.3 % (ref 11.6–15.1)
GFR SERPL CREATININE-BSD FRML MDRD: 65 ML/MIN/1.73SQ M
GLUCOSE SERPL-MCNC: 127 MG/DL (ref 65–140)
GLUCOSE UR STRIP-MCNC: NEGATIVE MG/DL
HCT VFR BLD AUTO: 42.9 % (ref 34.8–46.1)
HGB BLD-MCNC: 13.9 G/DL (ref 11.5–15.4)
HGB UR QL STRIP.AUTO: 10
IMM GRANULOCYTES # BLD AUTO: 0.02 THOUSAND/UL (ref 0–0.2)
IMM GRANULOCYTES NFR BLD AUTO: 0 % (ref 0–2)
KETONES UR STRIP-MCNC: NEGATIVE MG/DL
LEUKOCYTE ESTERASE UR QL STRIP: 25
LIPASE SERPL-CCNC: 75 U/L (ref 11–82)
LYMPHOCYTES # BLD AUTO: 1.77 THOUSANDS/ÂΜL (ref 0.6–4.47)
LYMPHOCYTES NFR BLD AUTO: 34 % (ref 14–44)
MAGNESIUM SERPL-MCNC: 1.8 MG/DL (ref 1.9–2.7)
MCH RBC QN AUTO: 29.3 PG (ref 26.8–34.3)
MCHC RBC AUTO-ENTMCNC: 32.4 G/DL (ref 31.4–37.4)
MCV RBC AUTO: 91 FL (ref 82–98)
MONOCYTES # BLD AUTO: 0.38 THOUSAND/ÂΜL (ref 0.17–1.22)
MONOCYTES NFR BLD AUTO: 7 % (ref 4–12)
MUCOUS THREADS UR QL AUTO: ABNORMAL
NEUTROPHILS # BLD AUTO: 2.83 THOUSANDS/ÂΜL (ref 1.85–7.62)
NEUTS SEG NFR BLD AUTO: 54 % (ref 43–75)
NITRITE UR QL STRIP: NEGATIVE
NON-SQ EPI CELLS URNS QL MICRO: ABNORMAL /HPF
NRBC BLD AUTO-RTO: 0 /100 WBCS
PH UR STRIP.AUTO: 5 [PH]
PLATELET # BLD AUTO: 158 THOUSANDS/UL (ref 149–390)
PMV BLD AUTO: 10.6 FL (ref 8.9–12.7)
POTASSIUM SERPL-SCNC: 3.7 MMOL/L (ref 3.5–5.3)
PROT SERPL-MCNC: 7.9 G/DL (ref 6.4–8.4)
PROT UR STRIP-MCNC: ABNORMAL MG/DL
RBC # BLD AUTO: 4.74 MILLION/UL (ref 3.81–5.12)
RBC #/AREA URNS AUTO: ABNORMAL /HPF
SODIUM SERPL-SCNC: 143 MMOL/L (ref 135–147)
SP GR UR STRIP.AUTO: 1.02 (ref 1–1.04)
UROBILINOGEN UA: NEGATIVE MG/DL
WBC # BLD AUTO: 5.21 THOUSAND/UL (ref 4.31–10.16)
WBC #/AREA URNS AUTO: ABNORMAL /HPF

## 2025-04-19 PROCEDURE — 83735 ASSAY OF MAGNESIUM: CPT | Performed by: EMERGENCY MEDICINE

## 2025-04-19 PROCEDURE — 85025 COMPLETE CBC W/AUTO DIFF WBC: CPT | Performed by: EMERGENCY MEDICINE

## 2025-04-19 PROCEDURE — 36415 COLL VENOUS BLD VENIPUNCTURE: CPT | Performed by: EMERGENCY MEDICINE

## 2025-04-19 PROCEDURE — 83690 ASSAY OF LIPASE: CPT | Performed by: EMERGENCY MEDICINE

## 2025-04-19 PROCEDURE — 80053 COMPREHEN METABOLIC PANEL: CPT | Performed by: EMERGENCY MEDICINE

## 2025-04-19 PROCEDURE — 81001 URINALYSIS AUTO W/SCOPE: CPT | Performed by: EMERGENCY MEDICINE

## 2025-04-19 PROCEDURE — 74176 CT ABD & PELVIS W/O CONTRAST: CPT

## 2025-04-19 PROCEDURE — 99284 EMERGENCY DEPT VISIT MOD MDM: CPT | Performed by: EMERGENCY MEDICINE

## 2025-04-19 PROCEDURE — 99284 EMERGENCY DEPT VISIT MOD MDM: CPT

## 2025-04-19 RX ORDER — DICYCLOMINE HCL 20 MG
20 TABLET ORAL 2 TIMES DAILY
Qty: 20 TABLET | Refills: 0 | Status: SHIPPED | OUTPATIENT
Start: 2025-04-19

## 2025-04-19 RX ORDER — LANOLIN ALCOHOL/MO/W.PET/CERES
800 CREAM (GRAM) TOPICAL ONCE
Status: COMPLETED | OUTPATIENT
Start: 2025-04-19 | End: 2025-04-19

## 2025-04-19 RX ADMIN — Medication 800 MG: at 03:12

## 2025-04-19 NOTE — ED PROVIDER NOTES
Time reflects when diagnosis was documented in both MDM as applicable and the Disposition within this note       Time User Action Codes Description Comment    4/19/2025  3:05 AM Marino Almanza [R10.9] Abdominal discomfort           ED Disposition       ED Disposition   Discharge    Condition   Stable    Date/Time   Sat Apr 19, 2025  3:05 AM    Comment   Diana MATUTE Ubaldo discharge to home/self care.                   Assessment & Plan       Medical Decision Making  Amount and/or Complexity of Data Reviewed  Labs: ordered. Decision-making details documented in ED Course.  Radiology: ordered.    Risk  OTC drugs.  Prescription drug management.      Amount and/or Complexity of Data Reviewed  Clinical lab tests: ordered and reviewed yes  Reviewed past medical records: yes     History Provided by patient     Differential considered infectious or inflammatory etiology    At risk for: Gastroenteritis, gastritis, GERD, at risk for cholecystitis cholelithiasis pancreatitis;   diverticulitis, colitis, appendicitis, obstruction, incarcerated / strangulated hernia  UTI  Gynecologic pathology     Labs looking for any acute changes to wbc count or any acute electrolyte abnormalities. LFTs for acute liver pathology, lipase for pancreatitis. Imaging for any acute pathology.   UA for ketones / infection.   Symptomatic treatments declined.     Labs within normal limits.   Mild hypomagnesemia, repleted  No signs of acute infection.   No leukocytosis or LFT elevation.    Lipase wnl.   Imaging shows no acute findings. No signs of acute infection.   Passed PO intake. PCP and GI follow up.     The patient was instructed to follow up as documented. Strict return precautions were discussed with the patient and the patient was instructed to return to the emergency department immediately if symptoms worsen. The patient/patient family member acknowledged and were in agreement with plan.       ED Course as of 04/19/25 0318   Sat Apr 19, 2025    0305 MAGNESIUM(!): 1.8  repleted       Medications   magnesium Oxide (MAG-OX) tablet 800 mg (800 mg Oral Given 4/19/25 0312)       ED Risk Strat Scores                    No data recorded        SBIRT 22yo+      Flowsheet Row Most Recent Value   Initial Alcohol Screen: US AUDIT-C     1. How often do you have a drink containing alcohol? 0 Filed at: 04/19/2025 0126   2. How many drinks containing alcohol do you have on a typical day you are drinking?  0 Filed at: 04/19/2025 0126   3b. FEMALE Any Age, or MALE 65+: How often do you have 4 or more drinks on one occassion? 0 Filed at: 04/19/2025 0126   Audit-C Score 0 Filed at: 04/19/2025 0126   BLADIMIR: How many times in the past year have you...    Used an illegal drug or used a prescription medication for non-medical reasons? Never Filed at: 04/19/2025 0126                            History of Present Illness       Chief Complaint   Patient presents with    Pelvic Pain     Pt reports lower left pelvic pain that radiates towards her back, states it started Monday after lifting cases of water. Danna any urinary symptoms. Pt took tylenol around midnight.      68 yo F hx of anemia previously on iron infusions, jejunal ulcers, HTN CKD stage 3, presents to ed for eval of lower abd pain.    Onset: Monday  Duration: intermittent  Pain currently: 7  Pain meds taken: tylenol  Prior similar pain: a few years ago  Where: Left lower abd  Radiation: to her back  Associated N/V: no  Associated with food: no  Emesis: No  Last BM: normal, this am  Urinary complaints:  no    Vaginal Discharge denies    Prior Surgeries abd: none  Does have a vagal stimulator for preventing over active bowels, has not used for a few years she states    Previous Imaging   Ct abd pelvis 7/2024:     1. Lobulated low-attenuation lesion in the proximal pancreatic body without pancreatic duct dilation suggestive of sidebranch intraductal papillary mucinous lesion. Lesion measures 1.3 x 1.1 cm. Continued  follow-up in 6 months with MRI abdomen with and   without gadolinium as it is more sensitive for evaluation of pancreatic lesions.     2. Normal size liver with nodular contour and diffuse steatosis suggestive of evolving hepatic fibrosis possibly from nonalcoholic steatohepatitis.    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    Alcohol intake: denies  Drugs: denies      Past Medical History:   Diagnosis Date    Anemia 12/12/2023    Asthma     Carpal tunnel syndrome 07/11/2016    Gastroesophageal reflux disease without esophagitis 12/26/2023    Gout 02/13/2021    Hypertension 10/25/2023    Lateral epicondylitis 10/09/2015    Stage 3 chronic kidney disease, unspecified whether stage 3a or 3b CKD (HCC) 01/13/2022      Past Surgical History:   Procedure Laterality Date    IMPLANTATION VAGAL NERVE STIMULATOR        Family History   Problem Relation Age of Onset    No Known Problems Mother     Kidney disease Father     No Known Problems Sister     No Known Problems Sister     No Known Problems Sister     No Known Problems Maternal Grandmother     No Known Problems Maternal Grandfather     No Known Problems Paternal Grandmother     No Known Problems Paternal Grandfather     Cancer Paternal Uncle     Breast cancer Neg Hx       Social History     Tobacco Use    Smoking status: Former     Current packs/day: 0.00     Types: Cigarettes     Quit date: 2015     Years since quitting: 10.3     Passive exposure: Past    Smokeless tobacco: Former    Tobacco comments:     exposure to passive smoke   Vaping Use    Vaping status: Never Used   Substance Use Topics    Alcohol use: Not Currently    Drug use: Never      E-Cigarette/Vaping    E-Cigarette Use Never User       E-Cigarette/Vaping Substances    Nicotine No     THC No     CBD No     Flavoring No     Other No     Unknown No       I have reviewed and agree with the history as documented.     HPI    Review of Systems   Constitutional:  Negative for chills,  fatigue and fever.   HENT:  Negative for sore throat.    Eyes:  Negative for redness and visual disturbance.   Respiratory:  Negative for cough and shortness of breath.    Cardiovascular:  Negative for chest pain.   Gastrointestinal:  Positive for abdominal pain. Negative for diarrhea and nausea.   Genitourinary:  Negative for difficulty urinating, dysuria and pelvic pain.   Musculoskeletal:  Negative for back pain.   Skin:  Negative for rash.   Neurological:  Negative for syncope, weakness and headaches.   All other systems reviewed and are negative.          Objective       ED Triage Vitals [04/19/25 0128]   Temperature Pulse Blood Pressure Respirations SpO2 Patient Position - Orthostatic VS   97.5 °F (36.4 °C) 78 (!) 176/93 18 93 % Sitting      Temp Source Heart Rate Source BP Location FiO2 (%) Pain Score    Oral Monitor Left arm -- --      Vitals      Date and Time Temp Pulse SpO2 Resp BP Pain Score FACES Pain Rating User   04/19/25 0128 97.5 °F (36.4 °C) 78 93 % 18 176/93 -- -- KA            Physical Exam  Vitals and nursing note reviewed.   Constitutional:       General: She is not in acute distress.     Appearance: She is obese.   HENT:      Head: Normocephalic and atraumatic.      Right Ear: External ear normal.      Left Ear: External ear normal.   Eyes:      Extraocular Movements: Extraocular movements intact.      Conjunctiva/sclera: Conjunctivae normal.   Cardiovascular:      Rate and Rhythm: Normal rate and regular rhythm.      Heart sounds: Normal heart sounds.   Pulmonary:      Effort: Pulmonary effort is normal. No respiratory distress.      Breath sounds: Normal breath sounds.   Abdominal:      General: Abdomen is flat.      Tenderness: There is abdominal tenderness in the left lower quadrant.   Musculoskeletal:         General: Normal range of motion.      Cervical back: Normal range of motion.   Skin:     General: Skin is warm and dry.   Neurological:      Mental Status: She is alert and oriented  to person, place, and time.      Cranial Nerves: No cranial nerve deficit.      Motor: No abnormal muscle tone.      Coordination: Coordination normal.         Results Reviewed       Procedure Component Value Units Date/Time    CMP [[id number]] Collected: 04/19/25 0143    Lab Status: Final result Specimen: Blood from Arm, Left Updated: 04/19/25 0206     Sodium 143 mmol/L      Potassium 3.7 mmol/L      Chloride 106 mmol/L      CO2 25 mmol/L      ANION GAP 12 mmol/L      BUN 13 mg/dL      Creatinine 0.91 mg/dL      Glucose 127 mg/dL      Calcium 9.7 mg/dL      AST 19 U/L      ALT 16 U/L      Alkaline Phosphatase 51 U/L      Total Protein 7.9 g/dL      Albumin 4.2 g/dL      Total Bilirubin 0.56 mg/dL      eGFR 65 ml/min/1.73sq m     Narrative:      National Kid[employer] Foundation guidelines for Chronic Kidney Disease (CKD):     Stage 1 with normal or high GFR (GFR > 90 mL/min/1.73 square meters)    Stage 2 Mild CKD (GFR = 60-89 mL/min/1.73 square meters)    Stage 3A Moderate CKD (GFR = 45-59 mL/min/1.73 square meters)    Stage 3B Moderate CKD (GFR = 30-44 mL/min/1.73 square meters)    Stage 4 Severe CKD (GFR = 15-29 mL/min/1.73 square meters)    Stage 5 End Stage CKD (GFR <15 mL/min/1.73 square meters)  Note: GFR calculation is accurate only with a steady state creatinine    Lipase [[id number]]  (Normal) Collected: 04/19/25 0143    Lab Status: Final result Specimen: Blood from Arm, Left Updated: 04/19/25 0206     Lipase 75 u/L     Magnesium [[id number]]  (Abnormal) Collected: 04/19/25 0143    Lab Status: Final result Specimen: Blood from Arm, Left Updated: 04/19/25 0206     Magnesium 1.8 mg/dL     Urine Microscopic [[id number]]  (Abnormal) Collected: 04/19/25 0146    Lab Status: Final result Specimen: Urine, Clean Catch Updated: 04/19/25 0202     RBC, UA 0-1 /hpf      WBC, UA 1-2 /hpf      Epithelial Cells Occasional /hpf      Bacteria, UA None Seen /hpf      MUCUS THREADS Occasional    UA (URINE) with reflex to Scope  [[id number]]  (Abnormal) Collected: 04/19/25 0146    Lab Status: Final result Specimen: Urine, Clean Catch Updated: 04/19/25 0153     Color, UA Batool     Clarity, UA Clear     Specific Gravity, UA 1.020     pH, UA 5.0     Leukocytes, UA 25.0     Nitrite, UA Negative     Protein, UA 15 (Trace) mg/dl      Glucose, UA Negative mg/dl      Ketones, UA Negative mg/dl      Bilirubin, UA Negative     Occult Blood, UA 10.0     UROBILINOGEN UA Negative mg/dL     CBC and differential [[id number]] Collected: 04/19/25 0143    Lab Status: Final result Specimen: Blood from Arm, Left Updated: 04/19/25 0148     WBC 5.21 Thousand/uL      RBC 4.74 Million/uL      Hemoglobin 13.9 g/dL      Hematocrit 42.9 %      MCV 91 fL      MCH 29.3 pg      MCHC 32.4 g/dL      RDW 14.3 %      MPV 10.6 fL      Platelets 158 Thousands/uL      nRBC 0 /100 WBCs      Segmented % 54 %      Immature Grans % 0 %      Lymphocytes % 34 %      Monocytes % 7 %      Eosinophils Relative 4 %      Basophils Relative 1 %      Absolute Neutrophils 2.83 Thousands/µL      Absolute Immature Grans 0.02 Thousand/uL      Absolute Lymphocytes 1.77 Thousands/µL      Absolute Monocytes 0.38 Thousand/µL      Eosinophils Absolute 0.18 Thousand/µL      Basophils Absolute 0.03 Thousands/µL             CT abdomen pelvis without contrast   Final Interpretation by Marcelino Maldonado MD (04/19 0241)      No acute inflammatory process identified within the abdomen or pelvis.      Workstation performed: [id number]             Procedures    ED Medication and Procedure Management   Prior to Admission Medications   Prescriptions Last Dose Informant Patient Reported? Taking?   Ascorbic Acid (vitamin C) 1000 MG tablet  Self No No   Sig: Take 1 tablet (1,000 mg total) by mouth daily   Fluticasone Furoate-Vilanterol 100-25 mcg/actuation inhaler   No No   Sig: Inhale 1 puff daily Rinse mouth after use.   albuterol (PROVENTIL HFA,VENTOLIN HFA) 90 mcg/act inhaler   No No   Sig: INHALE 1 PUFF BY MOUTH  EVERY 6 HOURS   aspirin (ECOTRIN) 325 mg EC tablet  Self No No   Sig: Take 1 tablet (325 mg total) by mouth daily   benzonatate (TESSALON PERLES) 100 mg capsule Not Taking  No No   Sig: Take 1 capsule (100 mg total) by mouth 3 (three) times a day as needed for cough   Patient not taking: Reported on 4/14/2025   cholecalciferol (VITAMIN D3) 1,000 units tablet   No No   Sig: TAKE 4 TABLETS BY MOUTH EVERY DAY   dextromethorphan-guaifenesin (MUCINEX DM)  MG per 12 hr tablet   No No   Sig: Take 1 tablet by mouth every 12 (twelve) hours   lisinopril (ZESTRIL) 5 mg tablet   No No   Sig: TAKE 1 TABLET(5 MG) BY MOUTH DAILY   methylPREDNISolone 4 MG tablet therapy pack   No No   Sig: Use as directed on package   oxybutynin (DITROPAN-XL) 10 MG 24 hr tablet   No No   Sig: TAKE 1 TABLET(10 MG) BY MOUTH DAILY   pantoprazole (PROTONIX) 20 mg tablet   No No   Sig: TAKE 1 TABLET(20 MG) BY MOUTH DAILY BEFORE BREAKFAST   pravastatin (PRAVACHOL) 10 mg tablet   No No   Sig: TAKE 1 TABLET(10 MG) BY MOUTH DAILY AT BEDTIME   triamcinolone (KENALOG) 0.1 % ointment   No No   Sig: Apply topically daily at bedtime      Facility-Administered Medications: None     Discharge Medication List as of 4/19/2025  3:07 AM        START taking these medications    Details   dicyclomine (BENTYL) 20 mg tablet Take 1 tablet (20 mg total) by mouth 2 (two) times a day, Starting Sat 4/19/2025, Normal           CONTINUE these medications which have NOT CHANGED    Details   albuterol (PROVENTIL HFA,VENTOLIN HFA) 90 mcg/act inhaler INHALE 1 PUFF BY MOUTH EVERY 6 HOURS, Normal      Ascorbic Acid (vitamin C) 1000 MG tablet Take 1 tablet (1,000 mg total) by mouth daily, Starting Tue 10/22/2024, Normal      aspirin (ECOTRIN) 325 mg EC tablet Take 1 tablet (325 mg total) by mouth daily, Starting Fri 10/16/2020, Normal      benzonatate (TESSALON PERLES) 100 mg capsule Take 1 capsule (100 mg total) by mouth 3 (three) times a day as needed for cough, Starting Tue  12/17/2024, Normal      cholecalciferol (VITAMIN D3) 1,000 units tablet TAKE 4 TABLETS BY MOUTH EVERY DAY, Starting Sun 3/2/2025, Normal      dextromethorphan-guaifenesin (MUCINEX DM)  MG per 12 hr tablet Take 1 tablet by mouth every 12 (twelve) hours, Starting Tue 12/17/2024, Normal      Fluticasone Furoate-Vilanterol 100-25 mcg/actuation inhaler Inhale 1 puff daily Rinse mouth after use., Starting Thu 4/17/2025, Until Sat 5/17/2025, Normal      lisinopril (ZESTRIL) 5 mg tablet TAKE 1 TABLET(5 MG) BY MOUTH DAILY, Starting Thu 3/6/2025, Normal      methylPREDNISolone 4 MG tablet therapy pack Use as directed on package, Normal      oxybutynin (DITROPAN-XL) 10 MG 24 hr tablet TAKE 1 TABLET(10 MG) BY MOUTH DAILY, Starting Thu 3/6/2025, Normal      pantoprazole (PROTONIX) 20 mg tablet TAKE 1 TABLET(20 MG) BY MOUTH DAILY BEFORE BREAKFAST, Normal      pravastatin (PRAVACHOL) 10 mg tablet TAKE 1 TABLET(10 MG) BY MOUTH DAILY AT BEDTIME, Starting Mon 12/16/2024, Normal      triamcinolone (KENALOG) 0.1 % ointment Apply topically daily at bedtime, Starting Thu 4/17/2025, Normal             ED SEPSIS DOCUMENTATION   Time reflects when diagnosis was documented in both MDM as applicable and the Disposition within this note       Time User Action Codes Description Comment    4/19/2025  3:05 AM Marino Almanza Add [R10.9] Abdominal discomfort                  Marino Almanza MD  04/19/25 3939

## 2025-04-21 ENCOUNTER — NURSE TRIAGE (OUTPATIENT)
Age: 70
End: 2025-04-21

## 2025-04-21 NOTE — TELEPHONE ENCOUNTER
Patients GI provider:  Dr. Piña    Number to return call: [phone]    Reason for call: Pt calling because she was instructed to call and schedule an earlier O/V after  ER visit. Pt rathers wait till attend her original appt on 07/02 as she doesn't have any pain right now and is doing okay.     Scheduled procedure/appointment date if applicable: Apt/procedure 07/02

## 2025-04-24 NOTE — TELEPHONE ENCOUNTER
Patient called in with Symptoms. Patient was transferred over to CHARLES [facility]  for further assistance.

## 2025-04-29 ENCOUNTER — OFFICE VISIT (OUTPATIENT)
Dept: FAMILY MEDICINE CLINIC | Facility: CLINIC | Age: 70
End: 2025-04-29
Payer: COMMERCIAL

## 2025-04-29 VITALS
OXYGEN SATURATION: 99 % | HEART RATE: 69 BPM | BODY MASS INDEX: 42.35 KG/M2 | WEIGHT: 182.2 LBS | DIASTOLIC BLOOD PRESSURE: 66 MMHG | RESPIRATION RATE: 18 BRPM | SYSTOLIC BLOOD PRESSURE: 188 MMHG | TEMPERATURE: 98 F

## 2025-04-29 DIAGNOSIS — E83.42 HYPOMAGNESEMIA: ICD-10-CM

## 2025-04-29 DIAGNOSIS — R10.9 ABDOMINAL DISCOMFORT: Primary | ICD-10-CM

## 2025-04-29 DIAGNOSIS — R05.2 SUBACUTE COUGH: ICD-10-CM

## 2025-04-29 PROCEDURE — 99214 OFFICE O/P EST MOD 30 MIN: CPT

## 2025-04-29 RX ORDER — MAGNESIUM OXIDE 400 MG/1
400 TABLET ORAL DAILY
Qty: 90 TABLET | Refills: 0 | Status: SHIPPED | OUTPATIENT
Start: 2025-04-29

## 2025-04-29 RX ORDER — BENZONATATE 100 MG/1
100 CAPSULE ORAL 3 TIMES DAILY PRN
Qty: 20 CAPSULE | Refills: 0 | Status: SHIPPED | OUTPATIENT
Start: 2025-04-29

## 2025-04-29 NOTE — PROGRESS NOTES
Name: Diana Conner      : 1955      MRN: [id number]  Encounter Provider: BRIE Hinojosa  Encounter Date: 2025   Encounter department: [facility]  :  Assessment & Plan  Abdominal discomfort  History of pancreatic cyst- stable on CT   No direct cause of abdominal pain in ED   Discharged on bentyl as needed        Hypomagnesemia   Magnesium 1.8  Replenished in the ED   Recurrent hypomagnesemia   Magnesium ordered   Orders:    magnesium oxide (MAG-OX) 400 mg tablet; Take 1 tablet (400 mg total) by mouth daily    Subacute cough    Orders:    benzonatate (TESSALON PERLES) 100 mg capsule; Take 1 capsule (100 mg total) by mouth 3 (three) times a day as needed for cough           History of Present Illness   Follow up from ED on  for primary diagnosis Abdominal discomfort. Work up unremarkable including blood work, CT scan and urinalysis. Received magnesium due to a decreased level of 1.8      Review of Systems   Constitutional:  Negative for activity change, chills, fatigue and fever.   HENT:  Negative for congestion, ear pain, rhinorrhea, sore throat and trouble swallowing.    Eyes:  Negative for pain and visual disturbance.   Respiratory:  Negative for cough, chest tightness and shortness of breath.    Cardiovascular:  Negative for chest pain, palpitations and leg swelling.   Gastrointestinal:  Negative for abdominal pain, constipation, diarrhea, nausea and vomiting.   Genitourinary:  Negative for difficulty urinating, dysuria, hematuria and urgency.   Musculoskeletal:  Negative for arthralgias and back pain.   Skin:  Negative for color change and rash.   Neurological:  Negative for dizziness, seizures, syncope and headaches.   Psychiatric/Behavioral:  Negative for dysphoric mood. The patient is not nervous/anxious.    All other systems reviewed and are negative.      Objective   BP (!) 188/66 (BP Location: Left arm, Patient Position: Sitting, Cuff Size:  Large)   Pulse 69   Temp 98 °F (36.7 °C) (Temporal)   Resp 18   Wt 82.6 kg (182 lb 3.2 oz)   SpO2 99%   BMI 42.35 kg/m²      Physical Exam  Vitals and nursing note reviewed.   Constitutional:       General: She is not in acute distress.     Appearance: Normal appearance. She is well-developed and normal weight.   HENT:      Head: Normocephalic and atraumatic.      Right Ear: Tympanic membrane, ear canal and external ear normal. There is no impacted cerumen.      Left Ear: Tympanic membrane, ear canal and external ear normal. There is no impacted cerumen.      Nose: Nose normal.      Mouth/Throat:      Mouth: Mucous membranes are moist.      Pharynx: Oropharynx is clear.   Eyes:      Extraocular Movements: Extraocular movements intact.      Conjunctiva/sclera: Conjunctivae normal.      Pupils: Pupils are equal, round, and reactive to light.   Cardiovascular:      Rate and Rhythm: Normal rate and regular rhythm.      Pulses: Normal pulses.      Heart sounds: Normal heart sounds. No murmur heard.  Pulmonary:      Effort: Pulmonary effort is normal. No respiratory distress.      Breath sounds: Normal breath sounds.   Abdominal:      General: Bowel sounds are normal.      Palpations: Abdomen is soft.      Tenderness: There is no abdominal tenderness.   Musculoskeletal:         General: Normal range of motion.      Cervical back: Normal range of motion and neck supple.      Right lower leg: No edema.      Left lower leg: No edema.   Skin:     General: Skin is warm and dry.      Capillary Refill: Capillary refill takes less than 2 seconds.   Neurological:      General: No focal deficit present.      Mental Status: She is alert and oriented to person, place, and time. Mental status is at baseline.   Psychiatric:         Mood and Affect: Mood normal.         Behavior: Behavior normal.         Thought Content: Thought content normal.         Judgment: Judgment normal.       Administrative Statements   I have spent a  total time of 20 minutes in caring for this patient on the day of the visit/encounter including Diagnostic results, Prognosis, Risks and benefits of tx options, Instructions for management, Patient and family education, Importance of tx compliance, Risk factor reductions, Impressions, Counseling / Coordination of care, Documenting in the medical record, Reviewing/placing orders in the medical record (including tests, medications, and/or procedures), and Obtaining or reviewing history  .

## 2025-04-30 ENCOUNTER — OFFICE VISIT (OUTPATIENT)
Dept: GASTROENTEROLOGY | Facility: CLINIC | Age: 70
End: 2025-04-30

## 2025-04-30 VITALS
WEIGHT: 183 LBS | BODY MASS INDEX: 42.35 KG/M2 | HEART RATE: 64 BPM | SYSTOLIC BLOOD PRESSURE: 128 MMHG | HEIGHT: 55 IN | DIASTOLIC BLOOD PRESSURE: 62 MMHG

## 2025-04-30 DIAGNOSIS — K55.20 AVM (ARTERIOVENOUS MALFORMATION) OF SMALL BOWEL, ACQUIRED: ICD-10-CM

## 2025-04-30 DIAGNOSIS — K86.9 PANCREATIC LESION: ICD-10-CM

## 2025-04-30 DIAGNOSIS — K58.0 IRRITABLE BOWEL SYNDROME WITH DIARRHEA: Primary | ICD-10-CM

## 2025-04-30 DIAGNOSIS — K21.9 GASTROESOPHAGEAL REFLUX DISEASE WITHOUT ESOPHAGITIS: ICD-10-CM

## 2025-04-30 RX ORDER — FAMOTIDINE 40 MG/1
40 TABLET, FILM COATED ORAL DAILY PRN
Qty: 30 TABLET | Refills: 2 | Status: SHIPPED | OUTPATIENT
Start: 2025-04-30

## 2025-04-30 NOTE — PROGRESS NOTES
Name: Diana Conner      : 1955      MRN: [id number]  Encounter Provider: Mariama Francis PA-C  Encounter Date: 2025   Encounter department: [facility] [place]  :  Assessment & Plan  Irritable bowel syndrome with diarrhea  Patient was suspected IBS diarrhea as she has undergone bidirectional endoscopic evaluation in 2023 that was essentially normal, but colonoscopy did note diverticulosis.  She had also undergone a capsule study this past October for her iron deficiency anemia that noted jejunal AVMs but otherwise appeared normal.  She recently went to the ER on the  of this month for lower abdominal/pelvic pain with CT essentially within normal limits.  Although there was no p.o. contrast so the study was limited, I also evaluated the CT scan pictures which appeared normal but did note small amount of  stool burden.  CMP, lipase, CBC within normal limits.  Today, the patient says that she is doing much better.  She says that she feels the pain started after being constipated abruptly.  She says that the constipation does not occur too often and that recently she has actually been having normal to soft bowel movements most days.  She says that once every few days she will have 1 or 2 episodes of loose bowel movements but she explains that this is not bothersome to her and does resolve with as needed Imodium.  She says that constipation does occur once in a while but this is rare and she said this did occur prior to her feeling pain in her lower abdomen which is why she went to the ER.  She says that since then, her pain has resolved and she has been moving her bowels well.  She says she has only had diarrhea about once since her ER visit.  -Please continue to ensure you are drinking at least 8 cups of water a day  -although she says her constipation is rare, I did ask the pt to use OTC miralax as needed on those days to not only prevent overflow  diarrhea but to also prevent pain       AVM (arteriovenous malformation) of small bowel, acquired  Patient with known jejunal AVMs however CBC and most recent ferritin within normal limits.  Patient does follow with hematology who is monitoring her CBC and iron levels. Pt denies overt GI bleeding.        Gastroesophageal reflux disease without esophagitis  The patient says that prior to going to the ER, she did have some heartburn and was using pepto with little relief. She says that she has not had any heartburn since the ER, but was wondering what she could take if this occurs again. She has allergy listed to prevacid.  Orders:    famotidine (PEPCID) 40 MG tablet; Take 1 tablet (40 mg total) by mouth daily as needed for heartburn Heartburn    Pancreatic lesion  Patient with known pancreatic lesion, suspected to be IPMN cyst.  CT with contrast in December noted stability as well as CT without contrast in the ER.  -Repeat CT scan due 12/2025           History of Present Illness   HPI  Diana Conner is a 69 y.o. female who presents for follow-up. The pt was in the ER on the 19th of this month after being constipated and having LLQ pain. Today, the patient says that she is doing much better.  She says that she feels the pain started after being constipated abruptly.  She says that the constipation does not occur too often and that recently she has actually been having normal to soft bowel movements most days.  She says that once every few days she will have 1 or 2 episodes of loose bowel movements but she explains that this is not bothersome to her and does resolve with as needed Imodium.  She says that constipation does occur once in a while but this is rare and she said this did occur prior to her feeling pain in her lower abdomen which is why she went to the ER.  She says that since then, her pain has resolved and she has been moving her bowels well.  She says she has only had diarrhea about once since her ER  visit.The patient says that prior to going to the ER, she did have some heartburn and was using pepto with little relief. She says that she has not had any heartburn since the ER, but was wondering what she could take if this occurs again. She has allergy listed to prevacid. Pt denies n/v, trouble swallowing, unintentional weight loss, fevers, chills, night sweats, bloody or black BM. She denies frequent NSAID use.   History obtained from: patient    Review of Systems   Constitutional:  Negative for chills, fever and unexpected weight change.   HENT:  Negative for trouble swallowing.    Gastrointestinal:  Negative for abdominal distention, abdominal pain, anal bleeding, blood in stool, constipation, diarrhea, nausea, rectal pain and vomiting.   All other systems reviewed and are negative.    Medical History Reviewed by provider this encounter:     .  Past Medical History   Past Medical History:   Diagnosis Date    Anemia 12/12/2023    Asthma     Carpal tunnel syndrome 07/11/2016    Gastroesophageal reflux disease without esophagitis 12/26/2023    Gout 02/13/2021    Hypertension 10/25/2023    Lateral epicondylitis 10/09/2015    Stage 3 chronic kidney disease, unspecified whether stage 3a or 3b CKD ([facility]) 01/13/2022     Past Surgical History:   Procedure Laterality Date    COLONOSCOPY      IMPLANTATION VAGAL NERVE STIMULATOR      UPPER GASTROINTESTINAL ENDOSCOPY       Family History   Problem Relation Age of Onset    No Known Problems Mother     Kidney disease Father     No Known Problems Sister     No Known Problems Sister     No Known Problems Sister     No Known Problems Maternal Grandmother     No Known Problems Maternal Grandfather     No Known Problems Paternal Grandmother     No Known Problems Paternal Grandfather     Cancer Paternal Uncle     Breast cancer Neg Hx       reports that she quit smoking about 10 years ago. Her smoking use included cigarettes. She has been exposed to tobacco smoke. She has quit using  smokeless tobacco. She reports that she does not currently use alcohol. She reports that she does not use drugs.  Current Outpatient Medications   Medication Instructions    albuterol (PROVENTIL HFA,VENTOLIN HFA) 90 mcg/act inhaler INHALE 1 PUFF BY MOUTH EVERY 6 HOURS    aspirin (ECOTRIN) 325 mg, Oral, Daily    benzonatate (TESSALON PERLES) 100 mg, Oral, 3 times daily PRN    cholecalciferol (VITAMIN D3) 4,000 Units, Oral, Daily    dicyclomine (BENTYL) 20 mg, Oral, 2 times daily    famotidine (PEPCID) 40 mg, Oral, Daily PRN, Heartburn    Fluticasone Furoate-Vilanterol 100-25 mcg/actuation inhaler 1 puff, Inhalation, Daily, Rinse mouth after use.    lisinopril (ZESTRIL) 5 mg, Oral, Daily    magnesium oxide (MAG-OX) 400 mg, Oral, Daily    oxybutynin (DITROPAN-XL) 10 mg, Oral, Daily    pantoprazole (PROTONIX) 20 mg tablet TAKE 1 TABLET(20 MG) BY MOUTH DAILY BEFORE BREAKFAST    pravastatin (PRAVACHOL) 10 mg, Oral, Daily at bedtime    triamcinolone (KENALOG) 0.1 % ointment Topical, Daily at bedtime    vitamin C 1,000 mg, Oral, Daily     Allergies   Allergen Reactions    Lansoprazole Palpitations     Erythema/ tachycardia  Other reaction(s): tachycardia    Penicillins Itching and Rash     Other reaction(s): Rash  Other reaction(s): Rash      Current Outpatient Medications on File Prior to Visit   Medication Sig Dispense Refill    albuterol (PROVENTIL HFA,VENTOLIN HFA) 90 mcg/act inhaler INHALE 1 PUFF BY MOUTH EVERY 6 HOURS 8.5 g 1    Ascorbic Acid (vitamin C) 1000 MG tablet Take 1 tablet (1,000 mg total) by mouth daily 90 tablet 1    aspirin (ECOTRIN) 325 mg EC tablet Take 1 tablet (325 mg total) by mouth daily 30 tablet 2    benzonatate (TESSALON PERLES) 100 mg capsule Take 1 capsule (100 mg total) by mouth 3 (three) times a day as needed for cough 20 capsule 0    cholecalciferol (VITAMIN D3) 1,000 units tablet TAKE 4 TABLETS BY MOUTH EVERY  tablet 1    dicyclomine (BENTYL) 20 mg tablet Take 1 tablet (20 mg total)  by mouth 2 (two) times a day 20 tablet 0    Fluticasone Furoate-Vilanterol 100-25 mcg/actuation inhaler Inhale 1 puff daily Rinse mouth after use. 60 blister 0    lisinopril (ZESTRIL) 5 mg tablet TAKE 1 TABLET(5 MG) BY MOUTH DAILY 90 tablet 1    magnesium oxide (MAG-OX) 400 mg tablet Take 1 tablet (400 mg total) by mouth daily 90 tablet 0    oxybutynin (DITROPAN-XL) 10 MG 24 hr tablet TAKE 1 TABLET(10 MG) BY MOUTH DAILY 90 tablet 1    pantoprazole (PROTONIX) 20 mg tablet TAKE 1 TABLET(20 MG) BY MOUTH DAILY BEFORE BREAKFAST 90 tablet 1    pravastatin (PRAVACHOL) 10 mg tablet TAKE 1 TABLET(10 MG) BY MOUTH DAILY AT BEDTIME 90 tablet 1    triamcinolone (KENALOG) 0.1 % ointment Apply topically daily at bedtime 30 g 0     No current facility-administered medications on file prior to visit.      Social History     Tobacco Use    Smoking status: Former     Current packs/day: 0.00     Types: Cigarettes     Quit date: 2015     Years since quitting: 10.3     Passive exposure: Past    Smokeless tobacco: Former    Tobacco comments:     exposure to passive smoke   Vaping Use    Vaping status: Never Used   Substance and Sexual Activity    Alcohol use: Not Currently    Drug use: Never    Sexual activity: Not on file        Objective   There were no vitals taken for this visit.     Physical Exam  Constitutional:       Appearance: Normal appearance.   Abdominal:      General: Bowel sounds are normal. There is no distension.      Palpations: There is no mass.      Tenderness: There is no abdominal tenderness. There is no guarding or rebound.   Neurological:      Mental Status: She is alert.         Administrative Statements   I have spent a total time of 30 minutes in caring for this patient on the day of the visit/encounter including Diagnostic results, Prognosis, Risks and benefits of tx options, Instructions for management, Patient and family education, Importance of tx compliance, Risk factor reductions, Impressions, Counseling /  Coordination of care, Documenting in the medical record, Reviewing/placing orders in the medical record (including tests, medications, and/or procedures), Obtaining or reviewing history  , and Communicating with other healthcare professionals .

## 2025-04-30 NOTE — PATIENT INSTRUCTIONS
Make sure you are drinking at least 8 cups of water/day   On the days that you do not move your bowels OR you only move your bowels small amounts, you can take miralax as needed to prevent the constipation and the pain

## 2025-06-09 ENCOUNTER — APPOINTMENT (OUTPATIENT)
Dept: LAB | Facility: HOSPITAL | Age: 70
End: 2025-06-09
Payer: COMMERCIAL

## 2025-06-09 DIAGNOSIS — L30.9 DERMATITIS: ICD-10-CM

## 2025-06-09 DIAGNOSIS — R73.03 PREDIABETES: ICD-10-CM

## 2025-06-09 DIAGNOSIS — J45.20 MILD INTERMITTENT ASTHMA, UNSPECIFIED WHETHER COMPLICATED: ICD-10-CM

## 2025-06-09 LAB
ALBUMIN SERPL BCG-MCNC: 4.1 G/DL (ref 3.5–5)
ALP SERPL-CCNC: 55 U/L (ref 34–104)
ALT SERPL W P-5'-P-CCNC: 11 U/L (ref 7–52)
ANION GAP SERPL CALCULATED.3IONS-SCNC: 9 MMOL/L (ref 4–13)
AST SERPL W P-5'-P-CCNC: 15 U/L (ref 13–39)
BILIRUB SERPL-MCNC: 0.54 MG/DL (ref 0.2–1)
BUN SERPL-MCNC: 18 MG/DL (ref 5–25)
CALCIUM SERPL-MCNC: 9.7 MG/DL (ref 8.4–10.2)
CHLORIDE SERPL-SCNC: 108 MMOL/L (ref 96–108)
CO2 SERPL-SCNC: 26 MMOL/L (ref 21–32)
CREAT SERPL-MCNC: 0.87 MG/DL (ref 0.6–1.3)
EST. AVERAGE GLUCOSE BLD GHB EST-MCNC: 108 MG/DL
GFR SERPL CREATININE-BSD FRML MDRD: 68 ML/MIN/1.73SQ M
GLUCOSE SERPL-MCNC: 117 MG/DL (ref 65–140)
HBA1C MFR BLD: 5.4 %
POTASSIUM SERPL-SCNC: 4.6 MMOL/L (ref 3.5–5.3)
PROT SERPL-MCNC: 7.1 G/DL (ref 6.4–8.4)
SODIUM SERPL-SCNC: 143 MMOL/L (ref 135–147)

## 2025-06-09 PROCEDURE — 36415 COLL VENOUS BLD VENIPUNCTURE: CPT

## 2025-06-09 PROCEDURE — 83036 HEMOGLOBIN GLYCOSYLATED A1C: CPT

## 2025-06-09 PROCEDURE — 86003 ALLG SPEC IGE CRUDE XTRC EA: CPT

## 2025-06-09 PROCEDURE — 80053 COMPREHEN METABOLIC PANEL: CPT

## 2025-06-11 ENCOUNTER — RESULTS FOLLOW-UP (OUTPATIENT)
Dept: FAMILY MEDICINE CLINIC | Facility: CLINIC | Age: 70
End: 2025-06-11

## 2025-06-11 LAB
A ALTERNATA IGE QN: <0.1 KU/L
A FUMIGATUS IGE QN: <0.1 KU/L
A PULLULANS IGE QN: <0.1 KU/L
C ALBICANS IGE QN: <0.1 KU/L
C HERBARUM IGE QN: <0.1 KU/L
E PURPURASCENS IGE QN: <0.1 KU/L
FUSARIUM PROLIFERATUM: <0.1 KU/L
Lab: NORMAL
M RACEMOSUS IGE QN: <0.1 KU/L
PENICILLIUM CHRYSOGENUM: <0.1 KU/L
PHOMA BETAE: <0.1 KU/L
SETOMELANOMMA ROSTRATA: <0.1 KU/L
STEMPHYLIUM HERBARUM: <0.1 KU/L

## 2025-06-13 DIAGNOSIS — E78.2 MIXED HYPERLIPIDEMIA: ICD-10-CM

## 2025-06-13 RX ORDER — PRAVASTATIN SODIUM 10 MG
10 TABLET ORAL
Qty: 90 TABLET | Refills: 1 | Status: SHIPPED | OUTPATIENT
Start: 2025-06-13

## 2025-06-25 ENCOUNTER — OFFICE VISIT (OUTPATIENT)
Dept: FAMILY MEDICINE CLINIC | Facility: CLINIC | Age: 70
End: 2025-06-25
Payer: COMMERCIAL

## 2025-06-25 VITALS
HEIGHT: 55 IN | TEMPERATURE: 97.8 F | DIASTOLIC BLOOD PRESSURE: 60 MMHG | HEART RATE: 63 BPM | OXYGEN SATURATION: 98 % | SYSTOLIC BLOOD PRESSURE: 100 MMHG | WEIGHT: 189.2 LBS | BODY MASS INDEX: 43.79 KG/M2

## 2025-06-25 DIAGNOSIS — E78.2 MIXED HYPERLIPIDEMIA: ICD-10-CM

## 2025-06-25 DIAGNOSIS — Z00.00 MEDICARE ANNUAL WELLNESS VISIT, SUBSEQUENT: Primary | ICD-10-CM

## 2025-06-25 DIAGNOSIS — I10 PRIMARY HYPERTENSION: Chronic | ICD-10-CM

## 2025-06-25 DIAGNOSIS — R06.00 DYSPNEA, UNSPECIFIED TYPE: ICD-10-CM

## 2025-06-25 DIAGNOSIS — E55.9 VITAMIN D DEFICIENCY: ICD-10-CM

## 2025-06-25 PROBLEM — E61.1 HYPOFERREMIA: Status: RESOLVED | Noted: 2024-11-25 | Resolved: 2025-06-25

## 2025-06-25 PROCEDURE — 99214 OFFICE O/P EST MOD 30 MIN: CPT

## 2025-06-25 PROCEDURE — G0444 DEPRESSION SCREEN ANNUAL: HCPCS

## 2025-06-25 PROCEDURE — G0537 PR RISK ASCVD TST ONCE PR 12 MO: HCPCS

## 2025-06-25 PROCEDURE — G0439 PPPS, SUBSEQ VISIT: HCPCS

## 2025-06-25 NOTE — PROGRESS NOTES
Assessment and Plan:     Problem List Items Addressed This Visit          Cardiovascular and Mediastinum    Primary hypertension (Chronic)    Today's /60  Current medication: Lisinopril 5mg  Continue current medication regimen               Other    Dyspnea - Primary    Shortness of breath has increased in intensity over the past few weeks  Current medication: Albuterol and  Fluticisone-Salmeterol   Was previously started on Fluticisone-Salmeterol at last visit   Patient states she never received medication   Reordered today  Shortness of breath can also be related to high BMI and low exertional threshold    Patient also exhibits signs of sleep apnea including   Waking up unrefreshed, snoring and shortness of breath while lying supine   Referral to sleep medicine for evaluation of sleep apnea          Hyperlipidemia    11/23 Lipid panel   Cholesterol 132, Triglycerides 123, LDL 66, HDL 44  07/24 Lipid panel   Cholesterol 161, Triglycerides 171, LDL 64, HDL 63  Current Medications: Pravastatin 10mg   Continue current medication regimen   Lifestyle modification: Incorporate regular exercise, avoid sugars and simple carbohydrates, weight loss and choosing healthier fats.           Vitamin D deficiency    02/23 Vitamin D level 15.7  Current Medication: Cholecalciferol 1000 units              Preventive health issues were discussed with patient, and age appropriate screening tests were ordered as noted in patient's After Visit Summary.  Personalized health advice and appropriate referrals for health education or preventive services given if needed, as noted in patient's After Visit Summary.     History of Present Illness:     Patient presents for a Medicare Wellness Visit    Critical access hospital        Patient Care Team:  BRIE Hinojosa as PCP - General (Nurse Practitioner)  Mal Awan MD as PCP - PCP-[employer] ([facility])  Darien Downs MD as PCP - PCP-[facility] ([facility])  BRIE Hinojosa as PCP -  PCP-[facility] ([facility])  Kavya Alcocer PA-C (Hematology and Oncology)  Juan Mayer MD (Nephrology)     Review of Systems:     Review of Systems   Constitutional:  Negative for activity change, chills, fatigue and fever.   HENT:  Negative for congestion, ear pain, rhinorrhea, sore throat and trouble swallowing.    Eyes:  Negative for pain and visual disturbance.   Respiratory:  Negative for cough, chest tightness and shortness of breath.    Cardiovascular:  Negative for chest pain, palpitations and leg swelling.   Gastrointestinal:  Negative for abdominal pain, constipation, diarrhea, nausea and vomiting.   Genitourinary:  Negative for difficulty urinating, dysuria, hematuria and urgency.   Musculoskeletal:  Negative for arthralgias and back pain.   Skin:  Negative for color change and rash.   Neurological:  Negative for dizziness, seizures, syncope and headaches.   Psychiatric/Behavioral:  Negative for dysphoric mood. The patient is not nervous/anxious.    All other systems reviewed and are negative.     Problem List:     Problem List[1]   Past Medical and Surgical History:     Past Medical History[2]  Past Surgical History[3]   Family History:     Family History[4]   Social History:     Social History[5]   Medications and Allergies:     Current Medications[6]  Allergies[7]   Immunizations:     Immunization History   Administered Date(s) Administered    COVID-19 MODERNA VACC 0.5 ML IM 03/30/2021, 04/26/2021    COVID-19, unspecified 05/01/2021    INFLUENZA 11/07/2015, 11/14/2016, 11/01/2017, 01/13/2022, 01/16/2023    Influenza, Quadrivalent (nasal) 11/01/2017    Influenza, high dose seasonal 0.7 mL 01/13/2022, 01/16/2023    Influenza, injectable, quadrivalent, preservative free 0.5 mL 01/21/2020    Influenza, recombinant, quadrivalent,injectable, preservative free 10/12/2018, 11/02/2020    Pneumococcal Conjugate 13-Valent 01/13/2022    Pneumococcal Conjugate Vaccine 20-valent (Pcv20), Polysace 01/16/2023     Tdap 09/04/2015    influenza, injectable, quadrivalent 05/01/2022      Health Maintenance:         Topic Date Due    Breast Cancer Screening: Mammogram  11/27/2026    Colorectal Cancer Screening  12/10/2033    Hepatitis C Screening  Completed         Topic Date Due    COVID-19 Vaccine (4 - 2024-25 season) 09/01/2024    Influenza Vaccine (Season Ended) 09/01/2025      Medicare Screening Tests and Risk Assessments:     Diana is here for her Subsequent Wellness visit. Last Medicare Wellness visit information reviewed, patient interviewed, no change since last AWV.     Health Risk Assessment:   Patient rates overall health as good. Patient feels that their physical health rating is same. Patient is satisfied with their life. Eyesight was rated as slightly worse. Hearing was rated as same. Patient feels that their emotional and mental health rating is same. Patients states they are sometimes angry. Patient states they are sometimes unusually tired/fatigued. Pain experienced in the last 7 days has been none. Patient states that she has experienced weight loss or gain in last 6 months.     Depression Screening:   PHQ-2 Score: 0      Fall Risk Screening:   In the past year, patient has experienced: no history of falling in past year      Urinary Incontinence Screening:   Patient has not leaked urine accidently in the last six months.     Home Safety:  Patient does not have trouble with stairs inside or outside of their home. Patient has working smoke alarms and has working carbon monoxide detector. Home safety hazards include: none.     Nutrition:   Current diet is Regular.     Medications:   Patient is currently taking over-the-counter supplements. OTC medications include: see medication list. Patient is able to manage medications.     Activities of Daily Living (ADLs)/Instrumental Activities of Daily Living (IADLs):   Walk and transfer into and out of bed and chair?: Yes  Dress and groom yourself?: Yes    Bathe or  shower yourself?: Yes    Feed yourself? Yes  Do your laundry/housekeeping?: Yes  Manage your money, pay your bills and track your expenses?: Yes  Make your own meals?: Yes    Do your own shopping?: Yes    Previous Hospitalizations:   Any hospitalizations or ED visits within the last 12 months?: Yes    How many hospitalizations have you had in the last year?: 1-2    Advance Care Planning:   Living will: No      Preventive Screenings      Cardiovascular Screening:    General: Screening Not Indicated and History Lipid Disorder      Diabetes Screening:     General: Screening Current      Colorectal Cancer Screening:     General: Screening Current      Breast Cancer Screening:     General: Screening Current      Cervical Cancer Screening:    General: Screening Not Indicated      Lung Cancer Screening:     General: Screening Not Indicated      Hepatitis C Screening:    General: Screening Current    Immunizations:  - Immunizations due: Zoster (Shingrix)    Cardiovascular Risk Assessment:  Patient does not have underlying ASCVD and their cardiovascular risk was assessed today. Their cardiovascular risk factors include: hypertension, hyperlipidemia, overweight/obesity and CKD/proteinuria.     The 10-year ASCVD risk score (Iris AVILA, et al., 2019) is: 5.6%    Values used to calculate the score:      Age: 69 years      Clincally relevant sex: Female      Is Non- : No      Diabetic: No      Tobacco smoker: No      Systolic Blood Pressure: 100 mmHg      Is BP treated: Yes      HDL Cholesterol: 63 mg/dL      Total Cholesterol: 161 mg/dL    Time spent assessing cardiovascular risk: 5 minutes.     Screening, Brief Intervention, and Referral to Treatment (SBIRT)     Screening  Typical number of drinks in a day: 0  Typical number of drinks in a week: 0  Interpretation: Low risk drinking behavior.    Single Item Drug Screening:  How often have you used an illegal drug (including marijuana) or a prescription  "medication for non-medical reasons in the past year? never    Single Item Drug Screen Score: 0  Interpretation: Negative screen for possible drug use disorder    Brief Intervention  Alcohol & drug use screenings were reviewed. No concerns regarding substance use disorder identified.     Time Spent  Time spent screening/evaluating the patient for alcohol misuse: 0 minutes. Time spent providing alcohol/substance abuse assessment and intervention services: 0 minutes.    Annual Depression Screening  Time spent screening and evaluating the patient for depression during today's encounter was 5 minutes.    Other Counseling Topics:   Car/seat belt/driving safety, skin self-exam, sunscreen and regular weightbearing exercise and calcium and vitamin D intake.     No results found.     Physical Exam:     /60 (BP Location: Left arm, Patient Position: Sitting, Cuff Size: Adult)   Pulse 63   Temp 97.8 °F (36.6 °C) (Temporal)   Ht 4' 7.1\" (1.4 m)   Wt 85.8 kg (189 lb 3.2 oz)   SpO2 98%   BMI 43.81 kg/m²     Physical Exam  Vitals and nursing note reviewed.   Constitutional:       General: She is not in acute distress.     Appearance: Normal appearance. She is well-developed and normal weight.   HENT:      Head: Normocephalic and atraumatic.      Right Ear: Tympanic membrane, ear canal and external ear normal. There is no impacted cerumen.      Left Ear: Tympanic membrane, ear canal and external ear normal. There is no impacted cerumen.      Nose: Nose normal.      Mouth/Throat:      Mouth: Mucous membranes are moist.      Pharynx: Oropharynx is clear.     Eyes:      Extraocular Movements: Extraocular movements intact.      Conjunctiva/sclera: Conjunctivae normal.      Pupils: Pupils are equal, round, and reactive to light.       Cardiovascular:      Rate and Rhythm: Normal rate and regular rhythm.      Pulses: Normal pulses.      Heart sounds: Normal heart sounds. No murmur heard.  Pulmonary:      Effort: Pulmonary " effort is normal. No respiratory distress.      Breath sounds: Normal breath sounds.   Abdominal:      General: Bowel sounds are normal.      Palpations: Abdomen is soft.      Tenderness: There is no abdominal tenderness.     Musculoskeletal:         General: Normal range of motion.      Cervical back: Normal range of motion and neck supple.      Right lower leg: No edema.      Left lower leg: No edema.     Skin:     General: Skin is warm and dry.      Capillary Refill: Capillary refill takes less than 2 seconds.     Neurological:      General: No focal deficit present.      Mental Status: She is alert and oriented to person, place, and time. Mental status is at baseline.     Psychiatric:         Mood and Affect: Mood normal.         Behavior: Behavior normal.         Thought Content: Thought content normal.         Judgment: Judgment normal.          BRIE Hinojosa    Patient Instructions   Medicare Preventive Visit Patient Instructions  Thank you for completing your Welcome to Medicare Visit or Medicare Annual Wellness Visit today. Your next wellness visit will be due in one year (6/26/2026).  The screening/preventive services that you may require over the next 5-10 years are detailed below. Some tests may not apply to you based off risk factors and/or age. Screening tests ordered at today's visit but not completed yet may show as past due. Also, please note that scanned in results may not display below.  Preventive Screenings:  Service Recommendations Previous Testing/Comments   Colorectal Cancer Screening  * Colonoscopy    * Fecal Occult Blood Test (FOBT)/Fecal Immunochemical Test (FIT)  * Fecal DNA/Cologuard Test  * Flexible Sigmoidoscopy Age: 45-75 years old   Colonoscopy: every 10 years (may be performed more frequently if at higher risk)  OR  FOBT/FIT: every 1 year  OR  Cologuard: every 3 years  OR  Sigmoidoscopy: every 5 years  Screening may be recommended earlier than age 45 if at higher risk for  colorectal cancer. Also, an individualized decision between you and your healthcare provider will decide whether screening between the ages of 76-85 would be appropriate. Colonoscopy: 12/13/2023  FOBT/FIT: 12/12/2023  Cologuard: Not on file  Sigmoidoscopy: Not on file    Screening Current     Breast Cancer Screening Age: 40+ years old  Frequency: every 1-2 years  Not required if history of left and right mastectomy Mammogram: 11/27/2024    Screening Current   Cervical Cancer Screening Between the ages of 21-29, pap smear recommended once every 3 years.   Between the ages of 30-65, can perform pap smear with HPV co-testing every 5 years.   Recommendations may differ for women with a history of total hysterectomy, cervical cancer, or abnormal pap smears in past. Pap Smear: 11/05/2018    Screening Not Indicated   Hepatitis C Screening Once for adults born between 1945 and 1965  More frequently in patients at high risk for Hepatitis C Hep C Antibody: Not on file    Screening Current   Diabetes Screening 1-2 times per year if you're at risk for diabetes or have pre-diabetes Fasting glucose: 85 mg/dL (11/27/2024)  A1C: 5.4 % (6/9/2025)  Screening Current   Cholesterol Screening Once every 5 years if you don't have a lipid disorder. May order more often based on risk factors. Lipid panel: 07/09/2024    Screening Not Indicated  History Lipid Disorder     Other Preventive Screenings Covered by Medicare:  Abdominal Aortic Aneurysm (AAA) Screening: covered once if your at risk. You're considered to be at risk if you have a family history of AAA.  Lung Cancer Screening: covers low dose CT scan once per year if you meet all of the following conditions: (1) Age 55-77; (2) No signs or symptoms of lung cancer; (3) Current smoker or have quit smoking within the last 15 years; (4) You have a tobacco smoking history of at least 20 pack years (packs per day multiplied by number of years you smoked); (5) You get a written order from a  healthcare provider.  Glaucoma Screening: covered annually if you're considered high risk: (1) You have diabetes OR (2) Family history of glaucoma OR (3)  aged 50 and older OR (4)  American aged 65 and older  Osteoporosis Screening: covered every 2 years if you meet one of the following conditions: (1) You're estrogen deficient and at risk for osteoporosis based off medical history and other findings; (2) Have a vertebral abnormality; (3) On glucocorticoid therapy for more than 3 months; (4) Have primary hyperparathyroidism; (5) On osteoporosis medications and need to assess response to drug therapy.   Last bone density test (DXA Scan): 11/21/2018.  HIV Screening: covered annually if you're between the age of 15-65. Also covered annually if you are younger than 15 and older than 65 with risk factors for HIV infection. For pregnant patients, it is covered up to 3 times per pregnancy.    Immunizations:  Immunization Recommendations   Influenza Vaccine Annual influenza vaccination during flu season is recommended for all persons aged >= 6 months who do not have contraindications   Pneumococcal Vaccine   * Pneumococcal conjugate vaccine = PCV13 (Prevnar 13), PCV15 (Vaxneuvance), PCV20 (Prevnar 20)  * Pneumococcal polysaccharide vaccine = PPSV23 (Pneumovax) Adults 19-65 yo with certain risk factors or if 65+ yo  If never received any pneumonia vaccine: recommend Prevnar 20 (PCV20)  Give PCV20 if previously received 1 dose of PCV13 or PPSV23   Hepatitis B Vaccine 3 dose series if at intermediate or high risk (ex: diabetes, end stage renal disease, liver disease)   Respiratory syncytial virus (RSV) Vaccine - COVERED BY MEDICARE PART D  * RSVPreF3 (Arexvy) CDC recommends that adults 60 years of age and older may receive a single dose of RSV vaccine using shared clinical decision-making (SCDM)   Tetanus (Td) Vaccine - COST NOT COVERED BY MEDICARE PART B Following completion of primary series, a  booster dose should be given every 10 years to maintain immunity against tetanus. Td may also be given as tetanus wound prophylaxis.   Tdap Vaccine - COST NOT COVERED BY MEDICARE PART B Recommended at least once for all adults. For pregnant patients, recommended with each pregnancy.   Shingles Vaccine (Shingrix) - COST NOT COVERED BY MEDICARE PART B  2 shot series recommended in those 19 years and older who have or will have weakened immune systems or those 50 years and older     Health Maintenance Due:      Topic Date Due    Breast Cancer Screening: Mammogram  11/27/2026    Colorectal Cancer Screening  12/10/2033    Hepatitis C Screening  Completed     Immunizations Due:      Topic Date Due    COVID-19 Vaccine (4 - 2024-25 season) 09/01/2024    Influenza Vaccine (Season Ended) 09/01/2025     Advance Directives   What are advance directives?  Advance directives are legal documents that state your wishes and plans for medical care. These plans are made ahead of time in case you lose your ability to make decisions for yourself. Advance directives can apply to any medical decision, such as the treatments you want, and if you want to donate organs.   What are the types of advance directives?  There are many types of advance directives, and each state has rules about how to use them. You may choose a combination of any of the following:  Living will:  This is a written record of the treatment you want. You can also choose which treatments you do not want, which to limit, and which to stop at a certain time. This includes surgery, medicine, IV fluid, and tube feedings.   Durable power of  for healthcare (DPAHC):  This is a written record that states who you want to make healthcare choices for you when you are unable to make them for yourself. This person, called a proxy, is usually a family member or a friend. You may choose more than 1 proxy.  Do not resuscitate (DNR) order:  A DNR order is used in case your  heart stops beating or you stop breathing. It is a request not to have certain forms of treatment, such as CPR. A DNR order may be included in other types of advance directives.  Medical directive:  This covers the care that you want if you are in a coma, near death, or unable to make decisions for yourself. You can list the treatments you want for each condition. Treatment may include pain medicine, surgery, blood transfusions, dialysis, IV or tube feedings, and a ventilator (breathing machine).  Values history:  This document has questions about your views, beliefs, and how you feel and think about life. This information can help others choose the care that you would choose.  Why are advance directives important?  An advance directive helps you control your care. Although spoken wishes may be used, it is better to have your wishes written down. Spoken wishes can be misunderstood, or not followed. Treatments may be given even if you do not want them. An advance directive may make it easier for your family to make difficult choices about your care.   Weight Management   Why it is important to manage your weight:  Being overweight increases your risk of health conditions such as heart disease, high blood pressure, type 2 diabetes, and certain types of cancer. It can also increase your risk for osteoarthritis, sleep apnea, and other respiratory problems. Aim for a slow, steady weight loss. Even a small amount of weight loss can lower your risk of health problems.  How to lose weight safely:  A safe and healthy way to lose weight is to eat fewer calories and get regular exercise. You can lose up about 1 pound a week by decreasing the number of calories you eat by 500 calories each day.   Healthy meal plan for weight management:  A healthy meal plan includes a variety of foods, contains fewer calories, and helps you stay healthy. A healthy meal plan includes the following:  Eat whole-grain foods more often.  A healthy  meal plan should contain fiber. Fiber is the part of grains, fruits, and vegetables that is not broken down by your body. Whole-grain foods are healthy and provide extra fiber in your diet. Some examples of whole-grain foods are whole-wheat breads and pastas, oatmeal, brown rice, and bulgur.  Eat a variety of vegetables every day.  Include dark, leafy greens such as spinach, kale, shahram greens, and mustard greens. Eat yellow and orange vegetables such as carrots, sweet potatoes, and winter squash.   Eat a variety of fruits every day.  Choose fresh or canned fruit (canned in its own juice or light syrup) instead of juice. Fruit juice has very little or no fiber.  Eat low-fat dairy foods.  Drink fat-free (skim) milk or 1% milk. Eat fat-free yogurt and low-fat cottage cheese. Try low-fat cheeses such as mozzarella and other reduced-fat cheeses.  Choose meat and other protein foods that are low in fat.  Choose beans or other legumes such as split peas or lentils. Choose fish, skinless poultry (chicken or turkey), or lean cuts of red meat (beef or pork). Before you cook meat or poultry, cut off any visible fat.   Use less fat and oil.  Try baking foods instead of frying them. Add less fat, such as margarine, sour cream, regular salad dressing and mayonnaise to foods. Eat fewer high-fat foods. Some examples of high-fat foods include french fries, doughnuts, ice cream, and cakes.  Eat fewer sweets.  Limit foods and drinks that are high in sugar. This includes candy, cookies, regular soda, and sweetened drinks.  Exercise:  Exercise at least 30 minutes per day on most days of the week. Some examples of exercise include walking, biking, dancing, and swimming. You can also fit in more physical activity by taking the stairs instead of the elevator or parking farther away from stores. Ask your healthcare provider about the best exercise plan for you.    © Copyright [employer] 2018 Information is for End User's use only  and may not be sold, redistributed or otherwise used for commercial purposes. All illustrations and images included in CareNotes® are the copyrighted property of [employer] or [employer]                [1]   Patient Active Problem List  Diagnosis    Dyspnea    Hyperlipidemia    Former smoker    Vitamin D deficiency    Stage 3a chronic kidney disease (HCC)    Atherosclerosis of arteries of extremities (HCC)    History of prediabetes    Primary hypertension    Pancreatic cyst    Symptomatic anemia    Gastroesophageal reflux disease without esophagitis    Constipation    Mixed stress and urge urinary incontinence    Iron deficiency anemia, unspecified    AVM (arteriovenous malformation) of small bowel, acquired    Gastrointestinal hemorrhage associated with peptic ulcer    Irritable bowel syndrome with diarrhea   [2]   Past Medical History:  Diagnosis Date    Anemia 12/12/2023    Asthma     Carpal tunnel syndrome 07/11/2016    Gastroesophageal reflux disease without esophagitis 12/26/2023    Gout 02/13/2021    Hypertension 10/25/2023    Hypoferremia 11/25/2024    Lateral epicondylitis 10/09/2015    Stage 3 chronic kidney disease, unspecified whether stage 3a or 3b CKD (HCC) 01/13/2022   [3]   Past Surgical History:  Procedure Laterality Date    COLONOSCOPY      IMPLANTATION VAGAL NERVE STIMULATOR      UPPER GASTROINTESTINAL ENDOSCOPY     [4]   Family History  Problem Relation Name Age of Onset    No Known Problems Mother faustino     Kidney disease Father izabella     No Known Problems Sister lilli     No Known Problems Sister ciara     No Known Problems Sister renaldo     No Known Problems Maternal Grandmother javed     No Known Problems Maternal Grandfather      No Known Problems Paternal Grandmother oswaldo     No Known Problems Paternal Grandfather      Cancer Paternal Uncle      Breast cancer Neg Hx     [5]   Social History  Socioeconomic History    Marital status: Single   Tobacco Use    Smoking status:  Former     Current packs/day: 0.00     Types: Cigarettes     Quit date: 2015     Years since quitting: 10.4     Passive exposure: Past    Smokeless tobacco: Former    Tobacco comments:     exposure to passive smoke   Vaping Use    Vaping status: Never Used   Substance and Sexual Activity    Alcohol use: Not Currently    Drug use: Never    Sexual activity: Not Currently     Social Drivers of Health     Financial Resource Strain: Low Risk  (1/16/2023)    Overall Financial Resource Strain (CARDIA)     Difficulty of Paying Living Expenses: Not hard at all   Food Insecurity: No Food Insecurity (6/25/2025)    Nursing - Inadequate Food Risk Classification     Worried About Running Out of Food in the Last Year: Never true     Ran Out of Food in the Last Year: Never true   Transportation Needs: No Transportation Needs (6/25/2025)    PRAPARE - Transportation     Lack of Transportation (Medical): No     Lack of Transportation (Non-Medical): No   Housing Stability: Unknown (6/25/2025)    Housing Stability Vital Sign     Unable to Pay for Housing in the Last Year: No     Homeless in the Last Year: No   [6]   Current Outpatient Medications   Medication Sig Dispense Refill    albuterol (PROVENTIL HFA,VENTOLIN HFA) 90 mcg/act inhaler INHALE 1 PUFF BY MOUTH EVERY 6 HOURS 8.5 g 1    Ascorbic Acid (vitamin C) 1000 MG tablet Take 1 tablet (1,000 mg total) by mouth daily 90 tablet 1    aspirin (ECOTRIN) 325 mg EC tablet Take 1 tablet (325 mg total) by mouth daily 30 tablet 2    cholecalciferol (VITAMIN D3) 1,000 units tablet TAKE 4 TABLETS BY MOUTH EVERY  tablet 1    dicyclomine (BENTYL) 20 mg tablet Take 1 tablet (20 mg total) by mouth 2 (two) times a day 20 tablet 0    famotidine (PEPCID) 40 MG tablet Take 1 tablet (40 mg total) by mouth daily as needed for heartburn Heartburn 30 tablet 2    Fluticasone Furoate-Vilanterol 100-25 mcg/actuation inhaler Inhale 1 puff daily Rinse mouth after use. 60 blister 0    lisinopril  (ZESTRIL) 5 mg tablet TAKE 1 TABLET(5 MG) BY MOUTH DAILY 90 tablet 1    magnesium oxide (MAG-OX) 400 mg tablet Take 1 tablet (400 mg total) by mouth daily 90 tablet 0    oxybutynin (DITROPAN-XL) 10 MG 24 hr tablet TAKE 1 TABLET(10 MG) BY MOUTH DAILY 90 tablet 1    pantoprazole (PROTONIX) 20 mg tablet TAKE 1 TABLET(20 MG) BY MOUTH DAILY BEFORE BREAKFAST 90 tablet 1    pravastatin (PRAVACHOL) 10 mg tablet TAKE 1 TABLET(10 MG) BY MOUTH DAILY AT BEDTIME 90 tablet 1    triamcinolone (KENALOG) 0.1 % ointment Apply topically daily at bedtime 30 g 0    benzonatate (TESSALON PERLES) 100 mg capsule Take 1 capsule (100 mg total) by mouth 3 (three) times a day as needed for cough (Patient not taking: Reported on 6/25/2025) 20 capsule 0     No current facility-administered medications for this visit.   [7]   Allergies  Allergen Reactions    Lansoprazole Palpitations     Erythema/ tachycardia  Other reaction(s): tachycardia    Penicillins Itching and Rash     Other reaction(s): Rash  Other reaction(s): Rash

## 2025-06-25 NOTE — PATIENT INSTRUCTIONS
Medicare Preventive Visit Patient Instructions  Thank you for completing your Welcome to Medicare Visit or Medicare Annual Wellness Visit today. Your next wellness visit will be due in one year (6/26/2026).  The screening/preventive services that you may require over the next 5-10 years are detailed below. Some tests may not apply to you based off risk factors and/or age. Screening tests ordered at today's visit but not completed yet may show as past due. Also, please note that scanned in results may not display below.  Preventive Screenings:  Service Recommendations Previous Testing/Comments   Colorectal Cancer Screening  * Colonoscopy    * Fecal Occult Blood Test (FOBT)/Fecal Immunochemical Test (FIT)  * Fecal DNA/Cologuard Test  * Flexible Sigmoidoscopy Age: 45-75 years old   Colonoscopy: every 10 years (may be performed more frequently if at higher risk)  OR  FOBT/FIT: every 1 year  OR  Cologuard: every 3 years  OR  Sigmoidoscopy: every 5 years  Screening may be recommended earlier than age 45 if at higher risk for colorectal cancer. Also, an individualized decision between you and your healthcare provider will decide whether screening between the ages of 76-85 would be appropriate. Colonoscopy: 12/13/2023  FOBT/FIT: 12/12/2023  Cologuard: Not on file  Sigmoidoscopy: Not on file    Screening Current     Breast Cancer Screening Age: 40+ years old  Frequency: every 1-2 years  Not required if history of left and right mastectomy Mammogram: 11/27/2024    Screening Current   Cervical Cancer Screening Between the ages of 21-29, pap smear recommended once every 3 years.   Between the ages of 30-65, can perform pap smear with HPV co-testing every 5 years.   Recommendations may differ for women with a history of total hysterectomy, cervical cancer, or abnormal pap smears in past. Pap Smear: 11/05/2018    Screening Not Indicated   Hepatitis C Screening Once for adults born between 1945 and 1965  More frequently in  patients at high risk for Hepatitis C Hep C Antibody: Not on file    Screening Current   Diabetes Screening 1-2 times per year if you're at risk for diabetes or have pre-diabetes Fasting glucose: 85 mg/dL (11/27/2024)  A1C: 5.4 % (6/9/2025)  Screening Current   Cholesterol Screening Once every 5 years if you don't have a lipid disorder. May order more often based on risk factors. Lipid panel: 07/09/2024    Screening Not Indicated  History Lipid Disorder     Other Preventive Screenings Covered by Medicare:  Abdominal Aortic Aneurysm (AAA) Screening: covered once if your at risk. You're considered to be at risk if you have a family history of AAA.  Lung Cancer Screening: covers low dose CT scan once per year if you meet all of the following conditions: (1) Age 55-77; (2) No signs or symptoms of lung cancer; (3) Current smoker or have quit smoking within the last 15 years; (4) You have a tobacco smoking history of at least 20 pack years (packs per day multiplied by number of years you smoked); (5) You get a written order from a healthcare provider.  Glaucoma Screening: covered annually if you're considered high risk: (1) You have diabetes OR (2) Family history of glaucoma OR (3)  aged 50 and older OR (4)  American aged 65 and older  Osteoporosis Screening: covered every 2 years if you meet one of the following conditions: (1) You're estrogen deficient and at risk for osteoporosis based off medical history and other findings; (2) Have a vertebral abnormality; (3) On glucocorticoid therapy for more than 3 months; (4) Have primary hyperparathyroidism; (5) On osteoporosis medications and need to assess response to drug therapy.   Last bone density test (DXA Scan): 11/21/2018.  HIV Screening: covered annually if you're between the age of 15-65. Also covered annually if you are younger than 15 and older than 65 with risk factors for HIV infection. For pregnant patients, it is covered up to 3 times per  pregnancy.    Immunizations:  Immunization Recommendations   Influenza Vaccine Annual influenza vaccination during flu season is recommended for all persons aged >= 6 months who do not have contraindications   Pneumococcal Vaccine   * Pneumococcal conjugate vaccine = PCV13 (Prevnar 13), PCV15 (Vaxneuvance), PCV20 (Prevnar 20)  * Pneumococcal polysaccharide vaccine = PPSV23 (Pneumovax) Adults 19-65 yo with certain risk factors or if 65+ yo  If never received any pneumonia vaccine: recommend Prevnar 20 (PCV20)  Give PCV20 if previously received 1 dose of PCV13 or PPSV23   Hepatitis B Vaccine 3 dose series if at intermediate or high risk (ex: diabetes, end stage renal disease, liver disease)   Respiratory syncytial virus (RSV) Vaccine - COVERED BY MEDICARE PART D  * RSVPreF3 (Arexvy) CDC recommends that adults 60 years of age and older may receive a single dose of RSV vaccine using shared clinical decision-making (SCDM)   Tetanus (Td) Vaccine - COST NOT COVERED BY MEDICARE PART B Following completion of primary series, a booster dose should be given every 10 years to maintain immunity against tetanus. Td may also be given as tetanus wound prophylaxis.   Tdap Vaccine - COST NOT COVERED BY MEDICARE PART B Recommended at least once for all adults. For pregnant patients, recommended with each pregnancy.   Shingles Vaccine (Shingrix) - COST NOT COVERED BY MEDICARE PART B  2 shot series recommended in those 19 years and older who have or will have weakened immune systems or those 50 years and older     Health Maintenance Due:      Topic Date Due   • Breast Cancer Screening: Mammogram  11/27/2026   • Colorectal Cancer Screening  12/10/2033   • Hepatitis C Screening  Completed     Immunizations Due:      Topic Date Due   • COVID-19 Vaccine (4 - 2024-25 season) 09/01/2024   • Influenza Vaccine (Season Ended) 09/01/2025     Advance Directives   What are advance directives?  Advance directives are legal documents that state your  wishes and plans for medical care. These plans are made ahead of time in case you lose your ability to make decisions for yourself. Advance directives can apply to any medical decision, such as the treatments you want, and if you want to donate organs.   What are the types of advance directives?  There are many types of advance directives, and each state has rules about how to use them. You may choose a combination of any of the following:  Living will:  This is a written record of the treatment you want. You can also choose which treatments you do not want, which to limit, and which to stop at a certain time. This includes surgery, medicine, IV fluid, and tube feedings.   Durable power of  for healthcare (DPAHC):  This is a written record that states who you want to make healthcare choices for you when you are unable to make them for yourself. This person, called a proxy, is usually a family member or a friend. You may choose more than 1 proxy.  Do not resuscitate (DNR) order:  A DNR order is used in case your heart stops beating or you stop breathing. It is a request not to have certain forms of treatment, such as CPR. A DNR order may be included in other types of advance directives.  Medical directive:  This covers the care that you want if you are in a coma, near death, or unable to make decisions for yourself. You can list the treatments you want for each condition. Treatment may include pain medicine, surgery, blood transfusions, dialysis, IV or tube feedings, and a ventilator (breathing machine).  Values history:  This document has questions about your views, beliefs, and how you feel and think about life. This information can help others choose the care that you would choose.  Why are advance directives important?  An advance directive helps you control your care. Although spoken wishes may be used, it is better to have your wishes written down. Spoken wishes can be misunderstood, or not followed.  Treatments may be given even if you do not want them. An advance directive may make it easier for your family to make difficult choices about your care.   Weight Management   Why it is important to manage your weight:  Being overweight increases your risk of health conditions such as heart disease, high blood pressure, type 2 diabetes, and certain types of cancer. It can also increase your risk for osteoarthritis, sleep apnea, and other respiratory problems. Aim for a slow, steady weight loss. Even a small amount of weight loss can lower your risk of health problems.  How to lose weight safely:  A safe and healthy way to lose weight is to eat fewer calories and get regular exercise. You can lose up about 1 pound a week by decreasing the number of calories you eat by 500 calories each day.   Healthy meal plan for weight management:  A healthy meal plan includes a variety of foods, contains fewer calories, and helps you stay healthy. A healthy meal plan includes the following:  Eat whole-grain foods more often.  A healthy meal plan should contain fiber. Fiber is the part of grains, fruits, and vegetables that is not broken down by your body. Whole-grain foods are healthy and provide extra fiber in your diet. Some examples of whole-grain foods are whole-wheat breads and pastas, oatmeal, brown rice, and bulgur.  Eat a variety of vegetables every day.  Include dark, leafy greens such as spinach, kale, shahram greens, and mustard greens. Eat yellow and orange vegetables such as carrots, sweet potatoes, and winter squash.   Eat a variety of fruits every day.  Choose fresh or canned fruit (canned in its own juice or light syrup) instead of juice. Fruit juice has very little or no fiber.  Eat low-fat dairy foods.  Drink fat-free (skim) milk or 1% milk. Eat fat-free yogurt and low-fat cottage cheese. Try low-fat cheeses such as mozzarella and other reduced-fat cheeses.  Choose meat and other protein foods that are low in fat.   Choose beans or other legumes such as split peas or lentils. Choose fish, skinless poultry (chicken or turkey), or lean cuts of red meat (beef or pork). Before you cook meat or poultry, cut off any visible fat.   Use less fat and oil.  Try baking foods instead of frying them. Add less fat, such as margarine, sour cream, regular salad dressing and mayonnaise to foods. Eat fewer high-fat foods. Some examples of high-fat foods include french fries, doughnuts, ice cream, and cakes.  Eat fewer sweets.  Limit foods and drinks that are high in sugar. This includes candy, cookies, regular soda, and sweetened drinks.  Exercise:  Exercise at least 30 minutes per day on most days of the week. Some examples of exercise include walking, biking, dancing, and swimming. You can also fit in more physical activity by taking the stairs instead of the elevator or parking farther away from stores. Ask your healthcare provider about the best exercise plan for you.    © Copyright [employer] 2018 Information is for End User's use only and may not be sold, redistributed or otherwise used for commercial purposes. All illustrations and images included in CareNotes® are the copyrighted property of A.D.A[employer] Inc. or [employer]

## 2025-06-25 NOTE — ASSESSMENT & PLAN NOTE
11/23 Lipid panel   Cholesterol 132, Triglycerides 123, LDL 66, HDL 44  07/24 Lipid panel   Cholesterol 161, Triglycerides 171, LDL 64, HDL 63  Current Medications: Pravastatin 10mg   Continue current medication regimen   Lifestyle modification: Incorporate regular exercise, avoid sugars and simple carbohydrates, weight loss and choosing healthier fats.

## 2025-06-30 ENCOUNTER — TELEPHONE (OUTPATIENT)
Dept: GASTROENTEROLOGY | Facility: CLINIC | Age: 70
End: 2025-06-30

## 2025-07-02 ENCOUNTER — OFFICE VISIT (OUTPATIENT)
Dept: GASTROENTEROLOGY | Facility: CLINIC | Age: 70
End: 2025-07-02
Payer: COMMERCIAL

## 2025-07-02 VITALS
BODY MASS INDEX: 43.74 KG/M2 | TEMPERATURE: 97.4 F | DIASTOLIC BLOOD PRESSURE: 70 MMHG | WEIGHT: 189 LBS | HEIGHT: 55 IN | SYSTOLIC BLOOD PRESSURE: 108 MMHG

## 2025-07-02 DIAGNOSIS — D50.0 IRON DEFICIENCY ANEMIA DUE TO CHRONIC BLOOD LOSS: ICD-10-CM

## 2025-07-02 DIAGNOSIS — K58.0 IRRITABLE BOWEL SYNDROME WITH DIARRHEA: ICD-10-CM

## 2025-07-02 DIAGNOSIS — K86.9 PANCREATIC LESION: Primary | ICD-10-CM

## 2025-07-02 DIAGNOSIS — K64.4 EXTERNAL HEMORRHOIDS: ICD-10-CM

## 2025-07-02 PROCEDURE — 99214 OFFICE O/P EST MOD 30 MIN: CPT | Performed by: PHYSICIAN ASSISTANT

## 2025-07-02 RX ORDER — ANORECTAL OINTMENT 15.7; .44; 24; 20.6 G/100G; G/100G; G/100G; G/100G
OINTMENT TOPICAL 4 TIMES DAILY
Qty: 20 G | Refills: 2 | Status: SHIPPED | OUTPATIENT
Start: 2025-07-02

## 2025-07-02 NOTE — ASSESSMENT & PLAN NOTE
She has seen hematology and GI due to iron deficiency anemia. EGD/colonoscopy with TI intubation December 2023 negative for bleeding. 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  She did not require intervention for this. Fortunately, her hemoglobin and iron levels improved significantly with IV iron infusions. Hemoglobin 13.9, ferritin 202. We will plan to repeat CBC in about 6 months.    Orders:    CBC; Future

## 2025-07-02 NOTE — ASSESSMENT & PLAN NOTE
Stable. She may continue Imodium or Pepto-Bismol as needed. Recommend using this sparingly to avoid constipation. Encouraged boosting fluid intake with goal 80 to 100 ounces daily. Continue eating high-fiber foods.

## 2025-07-02 NOTE — PROGRESS NOTES
Name: Diana Conner      : 1955      MRN: [id number]  Encounter Provider: Sarah Piña PA-C  Encounter Date: 2025   Encounter department: 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 No worrisome features or high risk stigmata noted on CT from 2024. Due to spinal cord stimulator, she should have repeat CT scan in 1 year (due Dec 2025).    Orders:    CT abdomen w contrast; Future    BUN; Future    Creatinine, serum; Future    External hemorrhoids  She has occasional anal itching and irritation likely secondary to known hemorrhoids. Recommend use of Calmoseptine ointment as needed for symptom relief.    Orders:    menthol-zinc oxide (CALMOSEPTINE) 0.44-20.6 % ointment    Iron deficiency anemia due to chronic blood loss  She has seen hematology and GI due to iron deficiency anemia. EGD/colonoscopy with TI intubation 2023 negative for bleeding. 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  She did not require intervention for this. Fortunately, her hemoglobin and iron levels improved significantly with IV iron infusions. Hemoglobin 13.9, ferritin 202. We will plan to repeat CBC in about 6 months.    Orders:    CBC; Future    Irritable bowel syndrome with diarrhea  Stable. She may continue Imodium or Pepto-Bismol as needed. Recommend using this sparingly to avoid constipation. Encouraged boosting fluid intake with goal 80 to 100 ounces daily. Continue eating high-fiber foods.       Follow-up in 6 months    History of Present Illness   HPI  Diana Conner is a 69 y.o. female who presents for follow-up of IBS, pancreas cyst, anemia.    History obtained from: patient    She is doing well.  She had a recent episode of bright red rectal bleeding but admits she has been intermittently constipated.  She  "continues to take Imodium as needed prior to appointments due to history of IBS-D.  Sometimes this makes her stools hard and lumpy and she strains.  She admits she does not drink enough fluid during the day.  She does try to get fiber by eating fruits and vegetables.  No significant abdominal pain.  No nausea, vomiting, reflux.       Objective   /70 (BP Location: Right arm, Patient Position: Sitting, Cuff Size: Adult)   Temp (!) 97.4 °F (36.3 °C) (Tympanic)   Ht 4' 7\" (1.397 m)   Wt 85.7 kg (189 lb)   BMI 43.93 kg/m²      Physical Exam  Vitals and nursing note reviewed.   Constitutional:       General: She is not in acute distress.     Appearance: She is well-developed.   HENT:      Head: Normocephalic and atraumatic.     Eyes:      Conjunctiva/sclera: Conjunctivae normal.       Cardiovascular:      Rate and Rhythm: Normal rate and regular rhythm.      Heart sounds: No murmur heard.  Pulmonary:      Effort: Pulmonary effort is normal. No respiratory distress.      Breath sounds: Normal breath sounds.   Abdominal:      Palpations: Abdomen is soft.      Tenderness: There is no abdominal tenderness.     Musculoskeletal:         General: No swelling.      Cervical back: Neck supple.     Skin:     General: Skin is warm and dry.     Neurological:      Mental Status: She is alert.     Psychiatric:         Mood and Affect: Mood normal.           "

## 2025-07-25 ENCOUNTER — TELEPHONE (OUTPATIENT)
Age: 70
End: 2025-07-25

## 2025-07-25 NOTE — TELEPHONE ENCOUNTER
Patient is scheduled for 6 month follow up for 12/30/25. Patient is questioning if she needs to have labs drawn. Please advise and return patients call at [phone].

## 2025-08-04 DIAGNOSIS — K21.9 GASTROESOPHAGEAL REFLUX DISEASE WITHOUT ESOPHAGITIS: ICD-10-CM

## 2025-08-05 RX ORDER — FAMOTIDINE 40 MG/1
TABLET, FILM COATED ORAL
Qty: 30 TABLET | Refills: 5 | Status: SHIPPED | OUTPATIENT
Start: 2025-08-05

## 2025-08-16 ENCOUNTER — APPOINTMENT (OUTPATIENT)
Dept: LAB | Facility: HOSPITAL | Age: 70
End: 2025-08-16
Payer: COMMERCIAL

## 2025-08-16 DIAGNOSIS — D50.0 CHRONIC BLOOD LOSS ANEMIA: ICD-10-CM

## 2025-08-16 DIAGNOSIS — K27.4 GASTROINTESTINAL HEMORRHAGE ASSOCIATED WITH PEPTIC ULCER: ICD-10-CM

## 2025-08-16 DIAGNOSIS — K55.20 AVM (ARTERIOVENOUS MALFORMATION) OF SMALL BOWEL, ACQUIRED: ICD-10-CM

## 2025-08-16 DIAGNOSIS — D50.0 IRON DEFICIENCY ANEMIA DUE TO CHRONIC BLOOD LOSS: ICD-10-CM

## 2025-08-16 DIAGNOSIS — K86.9 PANCREATIC LESION: ICD-10-CM

## 2025-08-16 LAB
BASOPHILS # BLD AUTO: 0.04 THOUSANDS/ÂΜL (ref 0–0.1)
BASOPHILS NFR BLD AUTO: 1 % (ref 0–1)
BUN SERPL-MCNC: 17 MG/DL (ref 5–25)
CREAT SERPL-MCNC: 0.82 MG/DL (ref 0.6–1.3)
EOSINOPHIL # BLD AUTO: 0.19 THOUSAND/ÂΜL (ref 0–0.61)
EOSINOPHIL NFR BLD AUTO: 4 % (ref 0–6)
ERYTHROCYTE [DISTWIDTH] IN BLOOD BY AUTOMATED COUNT: 12.8 % (ref 11.6–15.1)
FERRITIN SERPL-MCNC: 7 NG/ML (ref 30–307)
GFR SERPL CREATININE-BSD FRML MDRD: 73 ML/MIN/1.73SQ M
HCT VFR BLD AUTO: 37.9 % (ref 34.8–46.1)
HGB BLD-MCNC: 11.2 G/DL (ref 11.5–15.4)
IMM GRANULOCYTES # BLD AUTO: 0.02 THOUSAND/UL (ref 0–0.2)
IMM GRANULOCYTES NFR BLD AUTO: 0 % (ref 0–2)
IRON SATN MFR SERPL: 7 % (ref 15–50)
IRON SERPL-MCNC: 36 UG/DL (ref 50–212)
LYMPHOCYTES # BLD AUTO: 1.16 THOUSANDS/ÂΜL (ref 0.6–4.47)
LYMPHOCYTES NFR BLD AUTO: 23 % (ref 14–44)
MCH RBC QN AUTO: 25.3 PG (ref 26.8–34.3)
MCHC RBC AUTO-ENTMCNC: 29.6 G/DL (ref 31.4–37.4)
MCV RBC AUTO: 86 FL (ref 82–98)
MONOCYTES # BLD AUTO: 0.36 THOUSAND/ÂΜL (ref 0.17–1.22)
MONOCYTES NFR BLD AUTO: 7 % (ref 4–12)
NEUTROPHILS # BLD AUTO: 3.26 THOUSANDS/ÂΜL (ref 1.85–7.62)
NEUTS SEG NFR BLD AUTO: 65 % (ref 43–75)
NRBC BLD AUTO-RTO: 0 /100 WBCS
PLATELET # BLD AUTO: 203 THOUSANDS/UL (ref 149–390)
PMV BLD AUTO: 11.4 FL (ref 8.9–12.7)
RBC # BLD AUTO: 4.43 MILLION/UL (ref 3.81–5.12)
TIBC SERPL-MCNC: 509.6 UG/DL (ref 250–450)
TRANSFERRIN SERPL-MCNC: 364 MG/DL (ref 203–362)
UIBC SERPL-MCNC: 474 UG/DL (ref 155–355)
WBC # BLD AUTO: 5.03 THOUSAND/UL (ref 4.31–10.16)

## 2025-08-16 PROCEDURE — 36415 COLL VENOUS BLD VENIPUNCTURE: CPT

## 2025-08-16 PROCEDURE — 84520 ASSAY OF UREA NITROGEN: CPT

## 2025-08-16 PROCEDURE — 82728 ASSAY OF FERRITIN: CPT

## 2025-08-16 PROCEDURE — 85025 COMPLETE CBC W/AUTO DIFF WBC: CPT

## 2025-08-16 PROCEDURE — 83540 ASSAY OF IRON: CPT

## 2025-08-16 PROCEDURE — 82565 ASSAY OF CREATININE: CPT

## 2025-08-16 PROCEDURE — 83550 IRON BINDING TEST: CPT

## 2025-08-19 ENCOUNTER — OFFICE VISIT (OUTPATIENT)
Dept: HEMATOLOGY ONCOLOGY | Facility: CLINIC | Age: 70
End: 2025-08-19
Payer: COMMERCIAL

## 2025-08-19 ENCOUNTER — TELEPHONE (OUTPATIENT)
Dept: HEMATOLOGY ONCOLOGY | Facility: CLINIC | Age: 70
End: 2025-08-19

## 2025-08-19 ENCOUNTER — TELEPHONE (OUTPATIENT)
Dept: INFUSION CENTER | Facility: HOSPITAL | Age: 70
End: 2025-08-19

## 2025-08-19 VITALS
OXYGEN SATURATION: 98 % | TEMPERATURE: 97.6 F | BODY MASS INDEX: 44.43 KG/M2 | WEIGHT: 192 LBS | RESPIRATION RATE: 18 BRPM | HEIGHT: 55 IN | HEART RATE: 67 BPM | DIASTOLIC BLOOD PRESSURE: 70 MMHG | SYSTOLIC BLOOD PRESSURE: 110 MMHG

## 2025-08-19 DIAGNOSIS — K27.4 GASTROINTESTINAL HEMORRHAGE ASSOCIATED WITH PEPTIC ULCER: ICD-10-CM

## 2025-08-19 DIAGNOSIS — D50.0 CHRONIC BLOOD LOSS ANEMIA: Primary | ICD-10-CM

## 2025-08-19 DIAGNOSIS — K55.20 AVM (ARTERIOVENOUS MALFORMATION) OF SMALL BOWEL, ACQUIRED: ICD-10-CM

## 2025-08-19 PROCEDURE — 99215 OFFICE O/P EST HI 40 MIN: CPT | Performed by: PHYSICIAN ASSISTANT

## 2025-08-19 PROCEDURE — G2211 COMPLEX E/M VISIT ADD ON: HCPCS | Performed by: PHYSICIAN ASSISTANT

## 2025-08-19 RX ORDER — SODIUM CHLORIDE 9 MG/ML
20 INJECTION, SOLUTION INTRAVENOUS ONCE
Status: CANCELLED | OUTPATIENT
Start: 2025-08-27

## 2025-08-20 DIAGNOSIS — K27.4 GASTROINTESTINAL HEMORRHAGE ASSOCIATED WITH PEPTIC ULCER: Primary | ICD-10-CM

## 2025-08-20 DIAGNOSIS — D50.8 OTHER IRON DEFICIENCY ANEMIA: ICD-10-CM

## 2025-08-20 RX ORDER — SODIUM CHLORIDE 9 MG/ML
20 INJECTION, SOLUTION INTRAVENOUS ONCE
OUTPATIENT
Start: 2025-08-27